# Patient Record
Sex: FEMALE | Race: OTHER | HISPANIC OR LATINO | Employment: FULL TIME | URBAN - METROPOLITAN AREA
[De-identification: names, ages, dates, MRNs, and addresses within clinical notes are randomized per-mention and may not be internally consistent; named-entity substitution may affect disease eponyms.]

---

## 2017-11-20 ENCOUNTER — EH NON-PROVIDER (OUTPATIENT)
Dept: OCCUPATIONAL MEDICINE | Facility: CLINIC | Age: 32
End: 2017-11-20

## 2017-11-20 DIAGNOSIS — Z02.1 PRE-EMPLOYMENT HEALTH SCREENING EXAMINATION: ICD-10-CM

## 2017-11-20 DIAGNOSIS — Z02.1 PRE-EMPLOYMENT DRUG SCREENING: ICD-10-CM

## 2017-11-20 LAB
AMP AMPHETAMINE: NORMAL
BAR BARBITURATES: NORMAL
BZO BENZODIAZEPINES: NORMAL
COC COCAINE: NORMAL
INT CON NEG: NORMAL
INT CON POS: NORMAL
MDMA ECSTASY: NORMAL
MET METHAMPHETAMINES: NORMAL
MTD METHADONE: NORMAL
OPI OPIATES: NORMAL
OXY OXYCODONE: NORMAL
PCP PHENCYCLIDINE: NORMAL
POC URINE DRUG SCREEN OCDRS: NORMAL
THC: NORMAL

## 2017-11-20 PROCEDURE — 80305 DRUG TEST PRSMV DIR OPT OBS: CPT | Performed by: PREVENTIVE MEDICINE

## 2017-12-08 ENCOUNTER — EMPLOYEE HEALTH (OUTPATIENT)
Dept: OCCUPATIONAL MEDICINE | Facility: CLINIC | Age: 32
End: 2017-12-08

## 2017-12-08 ENCOUNTER — EH NON-PROVIDER (OUTPATIENT)
Dept: OCCUPATIONAL MEDICINE | Facility: CLINIC | Age: 32
End: 2017-12-08

## 2017-12-08 ENCOUNTER — HOSPITAL ENCOUNTER (OUTPATIENT)
Facility: MEDICAL CENTER | Age: 32
End: 2017-12-08
Attending: PREVENTIVE MEDICINE
Payer: COMMERCIAL

## 2017-12-08 VITALS
RESPIRATION RATE: 14 BRPM | WEIGHT: 197 LBS | SYSTOLIC BLOOD PRESSURE: 120 MMHG | TEMPERATURE: 97.8 F | HEART RATE: 96 BPM | OXYGEN SATURATION: 100 % | DIASTOLIC BLOOD PRESSURE: 76 MMHG | BODY MASS INDEX: 34.91 KG/M2 | HEIGHT: 63 IN

## 2017-12-08 DIAGNOSIS — Z02.1 ENCOUNTER FOR PRE-EMPLOYMENT HEALTH SCREENING EXAMINATION: ICD-10-CM

## 2017-12-08 DIAGNOSIS — Z02.1 PRE-EMPLOYMENT HEALTH SCREENING EXAMINATION: ICD-10-CM

## 2017-12-08 DIAGNOSIS — Z02.1 PRE-EMPLOYMENT DRUG SCREENING: ICD-10-CM

## 2017-12-08 PROCEDURE — 8915 PR COMPREHENSIVE PHYSICAL: Performed by: PREVENTIVE MEDICINE

## 2017-12-08 PROCEDURE — 86480 TB TEST CELL IMMUN MEASURE: CPT | Performed by: PREVENTIVE MEDICINE

## 2017-12-08 PROCEDURE — 90686 IIV4 VACC NO PRSV 0.5 ML IM: CPT | Performed by: PREVENTIVE MEDICINE

## 2017-12-11 LAB
M TB TUBERC IFN-G BLD QL: NEGATIVE
M TB TUBERC IFN-G/MITOGEN IGNF BLD: 0.01
M TB TUBERC IGNF/MITOGEN IGNF CONTROL: 54.54 [IU]/ML
MITOGEN IGNF BCKGRD COR BLD-ACNC: 0.17 [IU]/ML

## 2018-02-07 ENCOUNTER — OFFICE VISIT (OUTPATIENT)
Dept: MEDICAL GROUP | Facility: PHYSICIAN GROUP | Age: 33
End: 2018-02-07

## 2018-02-07 VITALS
RESPIRATION RATE: 12 BRPM | TEMPERATURE: 98.6 F | DIASTOLIC BLOOD PRESSURE: 66 MMHG | HEART RATE: 94 BPM | WEIGHT: 190 LBS | SYSTOLIC BLOOD PRESSURE: 110 MMHG | HEIGHT: 63 IN | BODY MASS INDEX: 33.66 KG/M2 | OXYGEN SATURATION: 99 %

## 2018-02-07 DIAGNOSIS — F41.9 ANXIETY AND DEPRESSION: ICD-10-CM

## 2018-02-07 DIAGNOSIS — M54.5 CHRONIC LOW BACK PAIN, UNSPECIFIED BACK PAIN LATERALITY, WITH SCIATICA PRESENCE UNSPECIFIED: ICD-10-CM

## 2018-02-07 DIAGNOSIS — F32.A ANXIETY AND DEPRESSION: ICD-10-CM

## 2018-02-07 DIAGNOSIS — F41.9 ANXIETY: ICD-10-CM

## 2018-02-07 DIAGNOSIS — G89.29 CHRONIC LOW BACK PAIN, UNSPECIFIED BACK PAIN LATERALITY, WITH SCIATICA PRESENCE UNSPECIFIED: ICD-10-CM

## 2018-02-07 DIAGNOSIS — E28.2 PCOS (POLYCYSTIC OVARIAN SYNDROME): ICD-10-CM

## 2018-02-07 DIAGNOSIS — L30.9 ECZEMA, UNSPECIFIED TYPE: ICD-10-CM

## 2018-02-07 PROBLEM — M54.50 CHRONIC LOW BACK PAIN: Status: ACTIVE | Noted: 2018-02-07

## 2018-02-07 PROBLEM — I10 ESSENTIAL HYPERTENSION: Status: ACTIVE | Noted: 2018-02-07

## 2018-02-07 PROBLEM — F33.9 RECURRENT MAJOR DEPRESSIVE DISORDER (HCC): Status: ACTIVE | Noted: 2018-02-07

## 2018-02-07 PROCEDURE — 99204 OFFICE O/P NEW MOD 45 MIN: CPT | Performed by: INTERNAL MEDICINE

## 2018-02-07 RX ORDER — VENLAFAXINE HYDROCHLORIDE 225 MG/1
225 TABLET, EXTENDED RELEASE ORAL DAILY
COMMUNITY
End: 2018-02-07 | Stop reason: SDUPTHER

## 2018-02-07 RX ORDER — LORAZEPAM 1 MG/1
1 TABLET ORAL 2 TIMES DAILY PRN
Qty: 60 TAB | Refills: 0 | Status: SHIPPED | OUTPATIENT
Start: 2018-02-07 | End: 2018-03-09

## 2018-02-07 RX ORDER — TRIAMCINOLONE ACETONIDE OINTMENT USP, 0.05% 0.5 MG/G
1 OINTMENT TOPICAL 2 TIMES DAILY
Qty: 45 G | Refills: 1 | Status: SHIPPED | OUTPATIENT
Start: 2018-02-07 | End: 2019-04-15

## 2018-02-07 RX ORDER — VENLAFAXINE HYDROCHLORIDE 225 MG/1
225 TABLET, EXTENDED RELEASE ORAL DAILY
Qty: 30 TAB | Refills: 2 | Status: SHIPPED | OUTPATIENT
Start: 2018-02-07 | End: 2018-06-19 | Stop reason: SDUPTHER

## 2018-02-07 RX ORDER — LORAZEPAM 1 MG/1
1 TABLET ORAL EVERY 4 HOURS PRN
COMMUNITY
End: 2018-02-07 | Stop reason: SDUPTHER

## 2018-02-07 RX ORDER — TRIAMCINOLONE ACETONIDE OINTMENT USP, 0.05% 0.5 MG/G
OINTMENT TOPICAL
COMMUNITY
End: 2018-02-07 | Stop reason: SDUPTHER

## 2018-02-07 ASSESSMENT — PATIENT HEALTH QUESTIONNAIRE - PHQ9
SUM OF ALL RESPONSES TO PHQ QUESTIONS 1-9: 8
5. POOR APPETITE OR OVEREATING: 2 - MORE THAN HALF THE DAYS
CLINICAL INTERPRETATION OF PHQ2 SCORE: 1

## 2018-02-07 NOTE — LETTER
[facility]  Pedro Hunt M.D.  [address]  [place] [address]  Fax: [phone]   Authorization for Release/Disclosure of   Protected Health Information   Name: EZRA LUNA : 1985 SSN: [id number]   Address: [facility] [place] [address] Phone:    [phone] (home)    I authorize the entity listed below to release/disclose the PHI below to:   [facility] hospital/Pedro Hunt M.D. and Pedro Hunt M.D.   Provider or Entity Name:  [facility]  Address: [address] [place] [address] [place] [address]  Phone: [phone]     Address   City, [facility], [facility]   Phone:      Fax:     Reason for request: continuity of care   Information to be released:    [  ] LAST COLONOSCOPY,  including any PATH REPORT and follow-up  [  ] LAST FIT/COLOGUARD RESULT [  ] LAST DEXA  [  ] LAST MAMMOGRAM  [  ] LAST PAP  [  ] LAST LABS [  ] RETINA EXAM REPORT  [  ] IMMUNIZATION RECORDS  [X] Release all info      [  ] Check here and initial the line next to each item to release ALL health information INCLUDING  _____ Care and treatment for drug and / or alcohol abuse  _____ HIV testing, infection status, or AIDS  _____ Genetic Testing    DATES OF SERVICE OR TIME PERIOD TO BE DISCLOSED: _____________  I understand and acknowledge that:  * This Authorization may be revoked at any time by you in writing, except if your health information has already been used or disclosed.  * Your health information that will be used or disclosed as a result of you signing this authorization could be re-disclosed by the recipient. If this occurs, your re-disclosed health information may no longer be protected by State or Federal laws.  * You may refuse to sign this Authorization. Your refusal will not affect your ability to obtain treatment.  * This Authorization becomes effective upon signing and will  on (date) __________.      If no date is indicated, this Authorization will  one (1) year from the signature date.       Name: Naomi Javed    Signature:   Date:     2/7/2018       PLEASE FAX REQUESTED RECORDS BACK TO: [phone]

## 2018-02-07 NOTE — LETTER
[facility]  Pedro Hunt M.D.  [address] [facility] [address]  [place] [address]  Fax: [phone]   Authorization for Release/Disclosure of   Protected Health Information   Name: EZRA LUNA : 1985 SSN: [id number]   Address: [facility] [address] [place] [address] Phone:    [phone] (home)    I authorize the entity listed below to release/disclose the PHI below to:   [facility]/Pedro Hunt M.D. and Pedro Hunt M.D.   Provider or Entity Name:  Doris Mcbride MD       Address   [facility], [address]  [address] [place] [address] Phone:  [phone]    Fax:     Reason for request: continuity of care   Information to be released:    [  ] LAST COLONOSCOPY,  including any PATH REPORT and follow-up  [  ] LAST FIT/COLOGUARD RESULT [  ] LAST DEXA  [  ] LAST MAMMOGRAM  [  ] LAST PAP  [  ] LAST LABS [  ] RETINA EXAM REPORT  [  ] IMMUNIZATION RECORDS  [X] Release all info      [  ] Check here and initial the line next to each item to release ALL health information INCLUDING  _____ Care and treatment for drug and / or alcohol abuse  _____ HIV testing, infection status, or AIDS  _____ Genetic Testing    DATES OF SERVICE OR TIME PERIOD TO BE DISCLOSED: _____________  I understand and acknowledge that:  * This Authorization may be revoked at any time by you in writing, except if your health information has already been used or disclosed.  * Your health information that will be used or disclosed as a result of you signing this authorization could be re-disclosed by the recipient. If this occurs, your re-disclosed health information may no longer be protected by State or Federal laws.  * You may refuse to sign this Authorization. Your refusal will not affect your ability to obtain treatment.  * This Authorization becomes effective upon signing and will  on (date) __________.      If no date is indicated, this Authorization will  one (1) year from the signature date.    Name: Ezra Luna    Signature:   Date:     2/7/2018       PLEASE FAX REQUESTED RECORDS BACK TO: [phone]

## 2018-02-08 NOTE — ASSESSMENT & PLAN NOTE
Pt is on metformin for this. She was being seen by an endocrinologist who was managing this prior to moving here.

## 2018-02-08 NOTE — PROGRESS NOTES
"Subjective:   Naomi Javed is a 32 y.o. female here today for anxiety/depression, back pain, PCOS, eczema,     Anxiety and depression  Pt has known depression and is on venlafaxine  mg daily and lorazepam 1 mg PRN for this. She was seeing a psychiatrist in [place] who was prescribing this. She states that the depression and anxiety were well controlled with the medication. She denies suicidal ideation.     Chronic low back pain  Pt is on norco for this chronically that was managed by pain management in [facility]. Denies fevers/chills, bowel/bladder incontinence, saddle anesthesia. She gets periodic weakness in the lower extremities. She states she had an MRI done last year with her back specialist. She is unsure of the name of the provider.     PCOS (polycystic ovarian syndrome)  Pt is on metformin for this. She was being seen by an endocrinologist who was managing this prior to moving here.     Eczema  Pt reports good control of this with triamciniolone.        Current medicines (including changes today)  Current Outpatient Prescriptions   Medication Sig Dispense Refill   • venlafaxine ER (EFFEXOR) 225 MG TABLET SR 24 HR tablet Take 1 Tab by mouth every day. 30 Tab 2   • LORazepam (ATIVAN) 1 MG Tab Take 1 Tab by mouth 2 times a day as needed for Anxiety for up to 30 days. 60 Tab 0   • metFORMIN (GLUCOPHAGE) 850 MG Tab Take 1 Tab by mouth 2 times a day, with meals. 60 Tab 1   • TRIAMCINOLONE ACETONIDE, TOP, 0.05 % Ointment 1 Application by Apply externally route 2 Times a Day. 45 g 1     No current facility-administered medications for this visit.      She  has a past medical history of Anxiety; Depression; and PCOS (polycystic ovarian syndrome).    ROS   No suicidal ideation, no fevers/chills     Objective:     Blood pressure 110/66, pulse 94, temperature 37 °C (98.6 °F), resp. rate 12, height 1.6 m (5' 3\"), weight 86.2 kg (190 lb), SpO2 99 %. Body mass index is 33.66 kg/m².   Physical " Exam:  Constitutional: Alert & oriented, no acute distress  Eye: Conjunctiva clear, lids normal, no discharge  ENMT: Lips without lesions, normal external nose and ears  Respiratory: Unlabored respiratory effort, lungs clear to auscultation, no wheezes, no ronchi  Cardiovascular: Normal S1, S2, no murmur  Abdomen: Obese  Skin: Warm, dry, good turgor, no rashes in visible areas  Neuro: No overt focal neurologic deficits  Psych: Normal mood and affect      Assessment and Plan:   The following treatment plan was discussed    1. Anxiety and depression  Well controlled. Records request sent to previous psychiatrist. No suicidal ideation. Will refer to psychiatrist for continued management per patient request  - venlafaxine ER (EFFEXOR) 225 MG TABLET SR 24 HR tablet; Take 1 Tab by mouth every day.  Dispense: 30 Tab; Refill: 2  - LORazepam (ATIVAN) 1 MG Tab; Take 1 Tab by mouth 2 times a day as needed for Anxiety for up to 30 days.  Dispense: 60 Tab; Refill: 0  - REFERRAL TO PSYCHIATRY    2. PCOS (polycystic ovarian syndrome)  Continue metformin. Will refer to endocrinologist for continued management per patient request  - metFORMIN (GLUCOPHAGE) 850 MG Tab; Take 1 Tab by mouth 2 times a day, with meals.  Dispense: 60 Tab; Refill: 1  - REFERRAL TO ENDOCRINOLOGY    3. Eczema, unspecified type  Continue triamcinolone  - TRIAMCINOLONE ACETONIDE, TOP, 0.05 % Ointment; 1 Application by Apply externally route 2 Times a Day.  Dispense: 45 g; Refill: 1    4. Anxiety  As above  - LORazepam (ATIVAN) 1 MG Tab; Take 1 Tab by mouth 2 times a day as needed for Anxiety for up to 30 days.  Dispense: 60 Tab; Refill: 0    5. Chronic low back pain, unspecified back pain laterality, with sciatica presence unspecified  Records request sent to previous pain specialist. Will refer to pain clinic. Will hold off on norco refill given patient is on lorazepam and this medication combination can have significant side effects potentially.   - REFERRAL  TO PAIN CLINIC    Followup: Return for as scheduled with new PCP.    Please note that this dictation was created using voice recognition software. I have made every reasonable attempt to correct obvious errors, but I expect that there are errors of grammar and possibly content that I did not discover before finalizing the note.

## 2018-02-08 NOTE — ASSESSMENT & PLAN NOTE
Pt is on norco for this chronically that was managed by pain management in [facility]. Denies fevers/chills, bowel/bladder incontinence, saddle anesthesia. She gets periodic weakness in the lower extremities. She states she had an MRI done last year with her back specialist. She is unsure of the name of the provider.

## 2018-02-08 NOTE — ASSESSMENT & PLAN NOTE
Pt has known depression and is on venlafaxine  mg daily and lorazepam 1 mg PRN for this. She was seeing a psychiatrist in [place] who was prescribing this. She states that the depression and anxiety were well controlled with the medication. She denies suicidal ideation.

## 2018-06-19 ENCOUNTER — TELEPHONE (OUTPATIENT)
Dept: MEDICAL GROUP | Facility: PHYSICIAN GROUP | Age: 33
End: 2018-06-19

## 2018-06-19 ENCOUNTER — OFFICE VISIT (OUTPATIENT)
Dept: MEDICAL GROUP | Facility: PHYSICIAN GROUP | Age: 33
End: 2018-06-19
Payer: COMMERCIAL

## 2018-06-19 VITALS
BODY MASS INDEX: 34.16 KG/M2 | HEIGHT: 63 IN | SYSTOLIC BLOOD PRESSURE: 132 MMHG | WEIGHT: 192.8 LBS | OXYGEN SATURATION: 99 % | TEMPERATURE: 97.4 F | DIASTOLIC BLOOD PRESSURE: 84 MMHG | HEART RATE: 80 BPM | RESPIRATION RATE: 16 BRPM

## 2018-06-19 DIAGNOSIS — E28.2 PCOS (POLYCYSTIC OVARIAN SYNDROME): ICD-10-CM

## 2018-06-19 DIAGNOSIS — Z13.29 SCREENING FOR ENDOCRINE DISORDER: ICD-10-CM

## 2018-06-19 DIAGNOSIS — F41.9 ANXIETY AND DEPRESSION: ICD-10-CM

## 2018-06-19 DIAGNOSIS — E66.9 OBESITY (BMI 30-39.9): ICD-10-CM

## 2018-06-19 DIAGNOSIS — M54.5 CHRONIC LOW BACK PAIN, UNSPECIFIED BACK PAIN LATERALITY, WITH SCIATICA PRESENCE UNSPECIFIED: ICD-10-CM

## 2018-06-19 DIAGNOSIS — F32.A ANXIETY AND DEPRESSION: ICD-10-CM

## 2018-06-19 DIAGNOSIS — G89.29 CHRONIC LOW BACK PAIN, UNSPECIFIED BACK PAIN LATERALITY, WITH SCIATICA PRESENCE UNSPECIFIED: ICD-10-CM

## 2018-06-19 DIAGNOSIS — Z83.3 FAMILY HISTORY OF DIABETES MELLITUS: ICD-10-CM

## 2018-06-19 PROCEDURE — 99214 OFFICE O/P EST MOD 30 MIN: CPT | Performed by: FAMILY MEDICINE

## 2018-06-19 RX ORDER — LORAZEPAM 1 MG/1
1 TABLET ORAL 3 TIMES DAILY
COMMUNITY
End: 2020-10-02

## 2018-06-19 RX ORDER — VENLAFAXINE HYDROCHLORIDE 225 MG/1
225 TABLET, EXTENDED RELEASE ORAL DAILY
Qty: 90 TAB | Refills: 1 | Status: SHIPPED | OUTPATIENT
Start: 2018-06-19 | End: 2018-06-29 | Stop reason: SDUPTHER

## 2018-06-19 NOTE — PROGRESS NOTES
Chief Complaint   Patient presents with   • Anxiety     effexor refill   • Back Pain     pain med refill       HISTORY OF PRESENT ILLNESS: Patient is a 32 y.o. female established patient here today for the following concerns:    Colleen is a pleasant female here today for same day visit.  She is between providers and does not have an appointment to establish until this Fall with her next PCP.  She has hx of depression and anxiety, chronic back pain and PCOS with strong family hx of DM2.     1. Anxiety and depression  Reports that her moods have been good.  Typically takes the effexor daily without changes.  Occasionally will need ativan for panic, but very rarely.  No SI/HI.     2. Chronic low back pain, unspecified back pain laterality, with sciatica presence unspecified  Has hx of spinal stenosis, and degenerative disc disease and facet arthritis per patient.  She typically uses ibuprofen, heating pad and tens unit.  She requests refill on hydrocodone today.  She would like to opt for pain management however.      3. PCOS (polycystic ovarian syndrome)  Has been on metformin for years with good tolerability.  Due for routine labs.      4. Family history of diabetes mellitus  Due for A1c.  No polyuria or polydipsia.     5. Screening for endocrine disorder  Due for screening labs.     6. Obesity (BMI 30-39.9)  Counseled today.       Past Medical, Social, and Family history reviewed and updated in EPIC    Allergies:Patient has no allergy information on record.    Current Outpatient Prescriptions   Medication Sig Dispense Refill   • Etonogestrel (NEXPLANON SC) Inject  as instructed.     • LORazepam (ATIVAN) 1 MG Tab Take 1 mg by mouth every four hours as needed for Anxiety.     • venlafaxine ER (EFFEXOR) 225 MG TABLET SR 24 HR tablet Take 1 Tab by mouth every day. 90 Tab 1   • metFORMIN (GLUCOPHAGE) 850 MG Tab Take 1 Tab by mouth 2 times a day, with meals. 60 Tab 1   • TRIAMCINOLONE ACETONIDE, TOP, 0.05 % Ointment 1  "Application by Apply externally route 2 Times a Day. 45 g 1     No current facility-administered medications for this visit.          ROS:  Review of Systems   Constitutional: Negative for fever, chills, weight loss and malaise/fatigue.   HENT: Negative for ear pain, nosebleeds, congestion, sore throat and neck pain.    Eyes: Negative for blurred vision.   Respiratory: Negative for cough, sputum production, shortness of breath and wheezing.    Cardiovascular: Negative for chest pain, palpitations,  and leg swelling.   Gastrointestinal: Negative for heartburn, nausea, vomiting, diarrhea and abdominal pain.   Genitourinary: Negative for dysuria, urgency and frequency.   Musculoskeletal: Negative for myalgias, back pain and joint pain.   Skin: Negative for rash and itching.   Neurological: Negative for dizziness, tingling, tremors, sensory change, focal weakness and headaches.   Endo/Heme/Allergies: Does not bruise/bleed easily.   Psychiatric/Behavioral: Negative for depression, anxiety, suicidal ideas, insomnia and memory loss.      Exam:  Blood pressure 132/84, pulse 80, temperature 36.3 °C (97.4 °F), resp. rate 16, height 1.6 m (5' 3\"), weight 87.5 kg (192 lb 12.8 oz), SpO2 99 %.    General:  Well nourished, well developed in NAD  Head is grossly normal.  Neck: Supple without JVD   Pulmonary:  Normal effort.   Cardiovascular: Regular rate  Extremities: no clubbing, cyanosis, or edema.  Psych: affect appropriate      Please note that this dictation was created using voice recognition software. I have made every reasonable attempt to correct obvious errors, but I expect that there are errors of grammar and possibly content that I did not discover before finalizing the note.    Assessment/Plan:  1. Anxiety and depression  - venlafaxine ER (EFFEXOR) 225 MG TABLET SR 24 HR tablet; Take 1 Tab by mouth every day.  Dispense: 90 Tab; Refill: 1    2. Chronic low back pain, unspecified back pain laterality, with sciatica " presence unspecified  - REFERRAL TO PAIN CLINIC  Request records, tells me last imaging testing was done 1 year ago in [place].    Declined to renew hydrocodone.   Continue ibuprofen, weight reduction, core strengthening.     3. PCOS (polycystic ovarian syndrome)  Check   - HEMOGLOBIN A1C; Future  - LIPID PROFILE; Future  - MICROALBUMIN CREAT RATIO URINE; Future  - COMP METABOLIC PANEL; Future  - TSH WITH REFLEX TO FT4; Future  Continue metformin    4. Family history of diabetes mellitus    - HEMOGLOBIN A1C; Future    5. Screening for endocrine disorder  - COMP METABOLIC PANEL; Future  - TSH WITH REFLEX TO FT4; Future  - VITAMIN D,25 HYDROXY; Future    6. Obesity (BMI 30-39.9)  - Patient identified as having weight management issue.  Appropriate orders and counseling given.

## 2018-06-19 NOTE — TELEPHONE ENCOUNTER
----- Message from Nora Keating M.D. sent at 6/19/2018  1:15 PM PDT -----  Please get last imaging studies, [place]

## 2018-06-19 NOTE — LETTER
[facility]  Hawk Melissa M.D.  [address] [facility] [address]  Fax: [phone]   Authorization for Release/Disclosure of   Protected Health Information   Name: EZRA LUNA : 1985 SSN: [id number]   Address: [address] [place], [place] [address]  [facility] [place] [address] Phone:    [phone] (home)    I authorize the entity listed below to release/disclose the PHI below to:   [facility]/Hawk Melissa M.D. and Nora Keating M.D.   Provider or Entity Name:     Address   City, State, Zip   Phone:      Fax:     Reason for request: continuity of care   Information to be released:    [  ] LAST COLONOSCOPY,  including any PATH REPORT and follow-up  [  ] LAST FIT/COLOGUARD RESULT [  ] LAST DEXA  [  ] LAST MAMMOGRAM  [  ] LAST PAP  [  ] LAST LABS [  ] RETINA EXAM REPORT  [  ] IMMUNIZATION RECORDS  [X] Release all info      [  ] Check here and initial the line next to each item to release ALL health information INCLUDING  _____ Care and treatment for drug and / or alcohol abuse  _____ HIV testing, infection status, or AIDS  _____ Genetic Testing    DATES OF SERVICE OR TIME PERIOD TO BE DISCLOSED: _____________  I understand and acknowledge that:  * This Authorization may be revoked at any time by you in writing, except if your health information has already been used or disclosed.  * Your health information that will be used or disclosed as a result of you signing this authorization could be re-disclosed by the recipient. If this occurs, your re-disclosed health information may no longer be protected by State or Federal laws.  * You may refuse to sign this Authorization. Your refusal will not affect your ability to obtain treatment.  * This Authorization becomes effective upon signing and will  on (date) __________.      If no date is indicated, this Authorization will  one (1) year from the signature date.    Name: Ezra Luna    Signature:   Date:     2018       PLEASE FAX REQUESTED  RECORDS BACK TO: [phone]

## 2018-06-29 DIAGNOSIS — F32.A ANXIETY AND DEPRESSION: ICD-10-CM

## 2018-06-29 DIAGNOSIS — F41.9 ANXIETY AND DEPRESSION: ICD-10-CM

## 2018-06-29 NOTE — TELEPHONE ENCOUNTER
Patient requests change in pharmacy.     [id number] - [place] - [address]  [address]  [place] [address]  Phone: [phone] Fax: [phone]

## 2018-07-02 RX ORDER — VENLAFAXINE HYDROCHLORIDE 225 MG/1
225 TABLET, EXTENDED RELEASE ORAL DAILY
Qty: 90 TAB | Refills: 1 | Status: SHIPPED | OUTPATIENT
Start: 2018-07-02 | End: 2018-08-02 | Stop reason: SDUPTHER

## 2018-08-01 ENCOUNTER — PATIENT MESSAGE (OUTPATIENT)
Dept: MEDICAL GROUP | Facility: PHYSICIAN GROUP | Age: 33
End: 2018-08-01

## 2018-08-01 DIAGNOSIS — F41.9 ANXIETY AND DEPRESSION: ICD-10-CM

## 2018-08-01 DIAGNOSIS — F32.A ANXIETY AND DEPRESSION: ICD-10-CM

## 2018-08-02 RX ORDER — VENLAFAXINE HYDROCHLORIDE 225 MG/1
225 TABLET, EXTENDED RELEASE ORAL DAILY
Qty: 90 TAB | Refills: 1 | Status: SHIPPED | OUTPATIENT
Start: 2018-08-02 | End: 2019-01-16

## 2018-08-02 RX ORDER — VENLAFAXINE HYDROCHLORIDE 150 MG/1
150 CAPSULE, EXTENDED RELEASE ORAL DAILY
Qty: 30 CAP | Refills: 3 | Status: SHIPPED | OUTPATIENT
Start: 2018-08-02 | End: 2019-01-16 | Stop reason: SDUPTHER

## 2019-01-16 RX ORDER — VENLAFAXINE HYDROCHLORIDE 150 MG/1
CAPSULE, EXTENDED RELEASE ORAL
Qty: 30 CAP | Refills: 2 | Status: SHIPPED | OUTPATIENT
Start: 2019-01-16 | End: 2019-03-07 | Stop reason: SDUPTHER

## 2019-01-16 NOTE — TELEPHONE ENCOUNTER
LOV 6/18. 3 month supply refilled. Please advise pt to make appt for follow-up and additional fills.

## 2019-03-12 ENCOUNTER — TELEPHONE (OUTPATIENT)
Dept: SCHEDULING | Facility: IMAGING CENTER | Age: 34
End: 2019-03-12

## 2019-04-15 ENCOUNTER — OFFICE VISIT (OUTPATIENT)
Dept: INTERNAL MEDICINE | Facility: MEDICAL CENTER | Age: 34
End: 2019-04-15
Payer: COMMERCIAL

## 2019-04-15 VITALS
TEMPERATURE: 98.1 F | DIASTOLIC BLOOD PRESSURE: 62 MMHG | SYSTOLIC BLOOD PRESSURE: 100 MMHG | BODY MASS INDEX: 33.13 KG/M2 | OXYGEN SATURATION: 97 % | HEIGHT: 63 IN | WEIGHT: 187 LBS | HEART RATE: 79 BPM

## 2019-04-15 DIAGNOSIS — L30.9 ECZEMA, UNSPECIFIED TYPE: ICD-10-CM

## 2019-04-15 DIAGNOSIS — F32.A DEPRESSION, UNSPECIFIED DEPRESSION TYPE: ICD-10-CM

## 2019-04-15 DIAGNOSIS — E66.9 OBESITY (BMI 30-39.9): ICD-10-CM

## 2019-04-15 DIAGNOSIS — G89.29 CHRONIC LOW BACK PAIN, UNSPECIFIED BACK PAIN LATERALITY, WITH SCIATICA PRESENCE UNSPECIFIED: ICD-10-CM

## 2019-04-15 DIAGNOSIS — Z80.41 FAMILY HISTORY OF OVARIAN CANCER: ICD-10-CM

## 2019-04-15 DIAGNOSIS — Z00.00 ROUTINE ADULT HEALTH MAINTENANCE: ICD-10-CM

## 2019-04-15 DIAGNOSIS — F41.9 ANXIETY: ICD-10-CM

## 2019-04-15 DIAGNOSIS — E28.2 PCOS (POLYCYSTIC OVARIAN SYNDROME): ICD-10-CM

## 2019-04-15 DIAGNOSIS — Z82.69 FAMILY HISTORY OF SYSTEMIC LUPUS ERYTHEMATOSUS: ICD-10-CM

## 2019-04-15 DIAGNOSIS — R31.9 URINARY TRACT INFECTION WITH HEMATURIA, SITE UNSPECIFIED: ICD-10-CM

## 2019-04-15 DIAGNOSIS — M54.5 CHRONIC LOW BACK PAIN, UNSPECIFIED BACK PAIN LATERALITY, WITH SCIATICA PRESENCE UNSPECIFIED: ICD-10-CM

## 2019-04-15 DIAGNOSIS — E11.8 TYPE 2 DIABETES MELLITUS WITH COMPLICATION, WITHOUT LONG-TERM CURRENT USE OF INSULIN (HCC): ICD-10-CM

## 2019-04-15 DIAGNOSIS — N39.0 URINARY TRACT INFECTION WITH HEMATURIA, SITE UNSPECIFIED: ICD-10-CM

## 2019-04-15 DIAGNOSIS — Z80.3 FAMILY HISTORY OF BREAST CANCER: ICD-10-CM

## 2019-04-15 DIAGNOSIS — R76.11 POSITIVE TB TEST: ICD-10-CM

## 2019-04-15 DIAGNOSIS — G43.809 OTHER MIGRAINE WITHOUT STATUS MIGRAINOSUS, NOT INTRACTABLE: ICD-10-CM

## 2019-04-15 PROBLEM — E11.9 TYPE 2 DIABETES MELLITUS (HCC): Status: ACTIVE | Noted: 2019-04-15

## 2019-04-15 PROBLEM — G43.009 NONINTRACTABLE MIGRAINE: Status: ACTIVE | Noted: 2019-04-15

## 2019-04-15 PROCEDURE — 99203 OFFICE O/P NEW LOW 30 MIN: CPT | Mod: GC | Performed by: INTERNAL MEDICINE

## 2019-04-15 RX ORDER — LAMOTRIGINE 100 MG/1
100 TABLET ORAL 2 TIMES DAILY
Qty: 90 TAB | Refills: 0 | Status: SHIPPED | OUTPATIENT
Start: 2019-04-15 | End: 2019-05-17 | Stop reason: SDUPTHER

## 2019-04-15 RX ORDER — TRIAMCINOLONE ACETONIDE 5 MG/G
1 CREAM TOPICAL 3 TIMES DAILY
Qty: 1 TUBE | Refills: 3 | Status: SHIPPED | OUTPATIENT
Start: 2019-04-15 | End: 2020-11-02 | Stop reason: SDUPTHER

## 2019-04-15 RX ORDER — TRIAMCINOLONE ACETONIDE 5 MG/G
CREAM TOPICAL 3 TIMES DAILY
COMMUNITY
End: 2019-04-15 | Stop reason: SDUPTHER

## 2019-04-15 RX ORDER — PHENTERMINE HYDROCHLORIDE 30 MG/1
30 CAPSULE ORAL EVERY MORNING
COMMUNITY
End: 2019-04-15 | Stop reason: SDUPTHER

## 2019-04-15 RX ORDER — VENLAFAXINE HYDROCHLORIDE 150 MG/1
150 CAPSULE, EXTENDED RELEASE ORAL DAILY
Qty: 60 CAP | Refills: 0 | Status: SHIPPED | OUTPATIENT
Start: 2019-04-15 | End: 2019-07-17 | Stop reason: SDUPTHER

## 2019-04-15 RX ORDER — IBUPROFEN 800 MG/1
800 TABLET ORAL EVERY 8 HOURS PRN
COMMUNITY
End: 2020-11-02

## 2019-04-15 RX ORDER — LAMOTRIGINE 100 MG/1
100 TABLET ORAL DAILY
COMMUNITY
End: 2019-04-15 | Stop reason: SDUPTHER

## 2019-04-15 RX ORDER — IBUPROFEN 800 MG
TABLET ORAL DAILY
COMMUNITY
End: 2019-07-17

## 2019-04-15 RX ORDER — SUMATRIPTAN 50 MG/1
50 TABLET, FILM COATED ORAL
Qty: 10 TAB | Refills: 0 | Status: SHIPPED | OUTPATIENT
Start: 2019-04-15 | End: 2020-11-02 | Stop reason: SDUPTHER

## 2019-04-15 RX ORDER — SUMATRIPTAN 50 MG/1
50 TABLET, FILM COATED ORAL
COMMUNITY
End: 2019-04-15 | Stop reason: SDUPTHER

## 2019-04-15 RX ORDER — PHENTERMINE HYDROCHLORIDE 30 MG/1
30 CAPSULE ORAL EVERY MORNING
Qty: 30 CAP | Refills: 0 | Status: SHIPPED | OUTPATIENT
Start: 2019-04-15 | End: 2019-04-15

## 2019-04-15 RX ORDER — HYDROCODONE BITARTRATE AND ACETAMINOPHEN 10; 325 MG/1; MG/1
1-2 TABLET ORAL EVERY 6 HOURS PRN
COMMUNITY
End: 2020-10-02

## 2019-04-15 NOTE — PATIENT INSTRUCTIONS
Obesity, Adult  Introduction  Obesity is having too much body fat. If you have a BMI of 30 or more, you are obese. BMI is a number that explains how much body fat you have. Obesity is often caused by taking in (consuming) more calories than your body uses.  Obesity can cause serious health problems. Changing your lifestyle can help to treat obesity.  Follow these instructions at home:  Eating and drinking  · Follow advice from your doctor about what to eat and drink. Your doctor may tell you to:  ¨ Cut down on (limit) fast foods, sweets, and processed snack foods.  ¨ Choose low-fat options. For example, choose low-fat milk instead of whole milk.  ¨ Eat 5 or more servings of fruits or vegetables every day.  ¨ Eat at home more often. This gives you more control over what you eat.  ¨ Choose healthy foods when you eat out.  ¨ Learn what a healthy portion size is. A portion size is the amount of a certain food that is healthy for you to eat at one time. This is different for each person.  ¨ Keep low-fat snacks available.  ¨ Avoid sugary drinks. These include soda, fruit juice, iced tea that is sweetened with sugar, and flavored milk.  ¨ Eat a healthy breakfast.  · Drink enough water to keep your pee (urine) clear or pale yellow.  · Do not go without eating for long periods of time (do not fast).  · Do not go on popular or trendy diets (fad diets).  Physical Activity  · Exercise often, as told by your doctor. Ask your doctor:  ¨ What types of exercise are safe for you.  ¨ How often you should exercise.  · Warm up and stretch before being active.  · Do slow stretching after being active (cool down).  · Rest between times of being active.  Lifestyle  · Limit how much time you spend in front of your TV, computer, or video game system (be less sedentary).  · Find ways to reward yourself that do not involve food.  · Limit alcohol intake to no more than 1 drink a day for nonpregnant women and 2 drinks a day for men. One drink  equals 12 oz of beer, 5 oz of wine, or 1½ oz of hard liquor.  General instructions  · Keep a weight loss journal. This can help you keep track of:  ¨ The food that you eat.  ¨ The exercise that you do.  · Take over-the-counter and prescription medicines only as told by your doctor.  · Take vitamins and supplements only as told by your doctor.  · Think about joining a support group. Your doctor may be able to help with this.  · Keep all follow-up visits as told by your doctor. This is important.  Contact a doctor if:  · You cannot meet your weight loss goal after you have changed your diet and lifestyle for 6 weeks.  This information is not intended to replace advice given to you by your health care provider. Make sure you discuss any questions you have with your health care provider.  Document Released: 03/11/2013 Document Revised: 05/25/2017 Document Reviewed: 10/05/2016  © 2017 [employer]

## 2019-04-16 ENCOUNTER — TELEPHONE (OUTPATIENT)
Dept: INTERNAL MEDICINE | Facility: MEDICAL CENTER | Age: 34
End: 2019-04-16

## 2019-04-16 NOTE — PROGRESS NOTES
New Patient to Establish    Reason to establish: New patient to establish    CC: Refills on meds/Obesity/PCOS    HPI: 33-year-old female with a past medical history of PCO S diabetes, obesity, history of multiple mood disorders including anxiety and depression, history of chronic low back pain status post discectomy/decompression surgery in the past on chronic opioids, anxiety on chronic anti-anxiolytics/benzodiazepines is coming in today to establish care with us.    Patient reports living in [place] for the past couple of years but prior to the used to live in [place] and since she moved she never established with any of the doctors in town.  Today she is here to establish care with us as well as to get refills on her medications.  She does states that for her medications she has been getting most of his medications through urgent care and [place].  She is going to be seeing a spine doctor for her chronic low back pain and is not requesting for any refills on opioid/benzodiazepines.  She is also scheduled to see psych doctor on July 17.  We shall give her a month of supply for her medications for right now until she establishes with a psychiatrist.  She is also further encouraged to see a spine doctor for low back pain and minimize use of benzos and opioids given her morbid obesity.  She would also like to get a referral to see a nutritionist and an endocrinologist given her history of PCOS and obesity.    Patient Active Problem List    Diagnosis Date Noted   • Family history of systemic lupus erythematosus 04/15/2019   • Depression 04/15/2019   • Type 2 diabetes mellitus (HCC) 04/15/2019   • Family history of ovarian cancer 04/15/2019   • Family history of breast cancer 04/15/2019   • Nonintractable migraine 04/15/2019   • Positive TB test 04/15/2019   • Obesity (BMI 30-39.9) 06/19/2018   • Anxiety and depression 02/07/2018   • PCOS (polycystic ovarian syndrome) 02/07/2018   • Eczema 02/07/2018   • Anxiety  02/07/2018   • Chronic low back pain 02/07/2018       Past Medical History:   Diagnosis Date   • Anxiety    • Depression    • PCOS (polycystic ovarian syndrome)        Current Outpatient Prescriptions   Medication Sig Dispense Refill   • HYDROcodone/acetaminophen (NORCO)  MG Tab Take 1-2 Tabs by mouth every 6 hours as needed.     • ibuprofen (MOTRIN) 800 MG Tab Take 800 mg by mouth every 8 hours as needed.     • Cholecalciferol (VITAMIN D3) 400 units Cap Take  by mouth every day.     • lamoTRIgine (LAMICTAL) 100 MG Tab Take 1 Tab by mouth 2 times a day. One tablet po in the morning and two tablets po at night 90 Tab 0   • metFORMIN (GLUCOPHAGE) 850 MG Tab Take 1 Tab by mouth 2 times a day, with meals. 60 Tab 1   • SUMAtriptan (IMITREX) 50 MG Tab Take 1 Tab by mouth Once PRN for Migraine. 10 Tab 0   • triamcinolone acetonide (KENALOG) 0.5 % Cream Apply 1 Tube to affected area(s) 3 times a day. 1 Tube 3   • Brexpiprazole (REXULTI) 0.5 MG Tab Take 0.5 mg by mouth every day. 30 Tab 0   • venlafaxine (EFFEXOR-XR) 150 MG extended-release capsule Take 1 Cap by mouth every day. 60 Cap 0   • Etonogestrel (NEXPLANON SC) Inject  as instructed.     • LORazepam (ATIVAN) 1 MG Tab Take 1 mg by mouth 3 times a day.       No current facility-administered medications for this visit.        Allergies as of 04/15/2019   • (No Known Allergies)       Social History     Social History   • Marital status: Single     Spouse name: N/A   • Number of children: N/A   • Years of education: N/A     Occupational History   • Not on file.     Social History Main Topics   • Smoking status: Never Smoker   • Smokeless tobacco: Never Used   • Alcohol use Yes      Comment: rarely. About once a month   • Drug use: No   • Sexual activity: Not on file     Other Topics Concern   • Not on file     Social History Narrative   • No narrative on file       Family History   Problem Relation Age of Onset   • Diabetes Mother    • Cancer Mother         ovarian  "  • Cancer Paternal Aunt         breast   • Heart Disease Maternal Grandmother    • Cancer Paternal Grandmother         pancreatic       Past Surgical History:   Procedure Laterality Date   • OTHER      back surgery       ROS: As per HPI. Additional pertinent symptoms as noted below.      /62 (BP Location: Left arm, Patient Position: Sitting, BP Cuff Size: Adult)   Pulse 79   Temp 36.7 °C (98.1 °F) (Temporal)   Ht 1.594 m (5' 2.75\")   Wt 84.8 kg (187 lb)   SpO2 97%   BMI 33.39 kg/m²     Physical Exam  General:  Alert and oriented, No apparent distress.    Eyes: Pupils equal and reactive. No scleral icterus.    Throat: Clear no erythema or exudates noted.    Neck: Supple. No lymphadenopathy noted. Thyroid not enlarged.    Lungs: Clear to auscultation and percussion bilaterally.    Cardiovascular: Regular rate and rhythm. No murmurs, rubs or gallops.    Abdomen:  Benign. No rebound or guarding noted.    Extremities: No clubbing, cyanosis, edema.    Skin: Clear. No rash or suspicious skin lesions noted.          Assessment and Plan    1. Family history of systemic lupus erythematosus  -Patient's mother recently got diagnosed with lupus.  She would like to get tested for it although she has no signs or symptoms of or manifestations of lupus at this point.  -ERON with reflex placed.    2. PCOS (polycystic ovarian syndrome)  -Patient has a history of PCO S and have no kids and currently has.  She is 3months weight no nausea recent weight on implant in place.    3. Anxiety  -On lorazepam currently and patient further instructed to discuss it with her psychiatrist to minimize use of lorazepam please note no prescriptions placed for any opioids or benzos today in the clinic.    4. Depression, unspecified depression type  -Patient is currently on Lamictal, Venlafaxine and Resxulti and benzodiazepines for anxiety/depression/mood problems.  -Upcoming appointment with psychiatrist on July 17.  Patient given 1 month " refills on all her prescriptions but is further instructed to seek immediate care to be under a psychiatrist given her long list of medications.  -Mood stable.    5. Eczema, unspecified type  -Triamcinolone cream ointment prescribed.    6. Obesity (BMI 30-39.9)  -Have been on phentermine for a while.  Patient further advised to abstain from the medication given its addictive profile.  No prescriptions given in the clinic today.  Referral for nutritionist placed today.    7. Type 2 diabetes mellitus with complication, without long-term current use of insulin (HCC)  -Refill given on metformin we shall check her HbA1c/fasting glucose.    8. Family history of ovarian cancer  -Mother as well as mother Sister suffered from ovarian cancer at the age of 30s and 40s.    9. Family history of breast cancer  -Father's sister suffers from breast cancer at a young age between 30s and 40s.  I think would be best to start screening around 40.  -She herself denies any lumps or bumps or nipple discharge.    10. Chronic low back pain, unspecified back pain laterality, with sciatica presence unspecified  On Chronic opiods.  S/p decompression surgery  Further instructed to follow up with spine doctor.    11. Other migraine without status migrainosus, not intractable  On Imitrex.  Prescription for refill placed.  Increase in frequency although not associated with auras or focal neurological deficits.  Further instructed on talking to psychiatrist about starting medication that can control the migraine as well.     12. Routine adult health maintenance  Check CBC CMP TSH vitamin D and lipid and HbA1c.    13. 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  She would like to repeat the testing again today.  No signs and symptoms of active TB.  Quant Gold ordered.     14. Urinary tract infection with hematuria, site unspecified  Check UA.           Signed by: Saul Nguyen M.D.

## 2019-04-16 NOTE — TELEPHONE ENCOUNTER
Pharmacy Name: [facility]    Pharmacy Phone#: [phone]    Pharmacy Fax#: [phone]    Request received via: fax    Message: pharmacy needs clarification on  Sig for triamcinolone ,sumatriptan and lamotrigine has two set of directions

## 2019-04-16 NOTE — TELEPHONE ENCOUNTER
"VOICEMAIL  1. Caller Name: [facility]                 Call Back Number:   2. Message: requesting clarification on instructions for Kenalog cream. Sig sent - \"Apply 1 Tube to affected area(s) 3 times a day\"    pcp please send in correctly to pharmacy.     3. Patient approves office to leave a detailed voicemail/MyChart message: N\A        "

## 2019-05-14 ENCOUNTER — HOSPITAL ENCOUNTER (OUTPATIENT)
Dept: LAB | Facility: MEDICAL CENTER | Age: 34
End: 2019-05-14
Attending: INTERNAL MEDICINE
Payer: COMMERCIAL

## 2019-05-14 DIAGNOSIS — R76.11 POSITIVE TB TEST: ICD-10-CM

## 2019-05-14 DIAGNOSIS — E11.8 TYPE 2 DIABETES MELLITUS WITH COMPLICATION, WITHOUT LONG-TERM CURRENT USE OF INSULIN (HCC): ICD-10-CM

## 2019-05-14 DIAGNOSIS — G89.29 CHRONIC LOW BACK PAIN, UNSPECIFIED BACK PAIN LATERALITY, WITH SCIATICA PRESENCE UNSPECIFIED: ICD-10-CM

## 2019-05-14 DIAGNOSIS — Z00.00 ROUTINE ADULT HEALTH MAINTENANCE: ICD-10-CM

## 2019-05-14 DIAGNOSIS — F32.A DEPRESSION, UNSPECIFIED DEPRESSION TYPE: ICD-10-CM

## 2019-05-14 DIAGNOSIS — R31.9 URINARY TRACT INFECTION WITH HEMATURIA, SITE UNSPECIFIED: ICD-10-CM

## 2019-05-14 DIAGNOSIS — Z82.69 FAMILY HISTORY OF SYSTEMIC LUPUS ERYTHEMATOSUS: ICD-10-CM

## 2019-05-14 DIAGNOSIS — F41.9 ANXIETY: ICD-10-CM

## 2019-05-14 DIAGNOSIS — E66.9 OBESITY (BMI 30-39.9): ICD-10-CM

## 2019-05-14 DIAGNOSIS — Z80.3 FAMILY HISTORY OF BREAST CANCER: ICD-10-CM

## 2019-05-14 DIAGNOSIS — E28.2 PCOS (POLYCYSTIC OVARIAN SYNDROME): ICD-10-CM

## 2019-05-14 DIAGNOSIS — L30.9 ECZEMA, UNSPECIFIED TYPE: ICD-10-CM

## 2019-05-14 DIAGNOSIS — M54.5 CHRONIC LOW BACK PAIN, UNSPECIFIED BACK PAIN LATERALITY, WITH SCIATICA PRESENCE UNSPECIFIED: ICD-10-CM

## 2019-05-14 DIAGNOSIS — Z80.41 FAMILY HISTORY OF OVARIAN CANCER: ICD-10-CM

## 2019-05-14 DIAGNOSIS — N39.0 URINARY TRACT INFECTION WITH HEMATURIA, SITE UNSPECIFIED: ICD-10-CM

## 2019-05-14 DIAGNOSIS — G43.809 OTHER MIGRAINE WITHOUT STATUS MIGRAINOSUS, NOT INTRACTABLE: ICD-10-CM

## 2019-05-14 LAB
25(OH)D3 SERPL-MCNC: 20 NG/ML (ref 30–100)
ALBUMIN SERPL BCP-MCNC: 3.9 G/DL (ref 3.2–4.9)
ALBUMIN/GLOB SERPL: 1.4 G/DL
ALP SERPL-CCNC: 59 U/L (ref 30–99)
ALT SERPL-CCNC: 23 U/L (ref 2–50)
ANION GAP SERPL CALC-SCNC: 5 MMOL/L (ref 0–11.9)
APPEARANCE UR: CLEAR
AST SERPL-CCNC: 15 U/L (ref 12–45)
BASOPHILS # BLD AUTO: 0.6 % (ref 0–1.8)
BASOPHILS # BLD: 0.03 K/UL (ref 0–0.12)
BILIRUB SERPL-MCNC: 0.6 MG/DL (ref 0.1–1.5)
BILIRUB UR QL STRIP.AUTO: NEGATIVE
BUN SERPL-MCNC: 11 MG/DL (ref 8–22)
CALCIUM SERPL-MCNC: 8.8 MG/DL (ref 8.5–10.5)
CHLORIDE SERPL-SCNC: 108 MMOL/L (ref 96–112)
CHOLEST SERPL-MCNC: 143 MG/DL (ref 100–199)
CO2 SERPL-SCNC: 23 MMOL/L (ref 20–33)
COLOR UR: YELLOW
CREAT SERPL-MCNC: 0.48 MG/DL (ref 0.5–1.4)
EOSINOPHIL # BLD AUTO: 0.11 K/UL (ref 0–0.51)
EOSINOPHIL NFR BLD: 2.1 % (ref 0–6.9)
ERYTHROCYTE [DISTWIDTH] IN BLOOD BY AUTOMATED COUNT: 39.9 FL (ref 35.9–50)
EST. AVERAGE GLUCOSE BLD GHB EST-MCNC: 111 MG/DL
FASTING STATUS PATIENT QL REPORTED: NORMAL
GLOBULIN SER CALC-MCNC: 2.8 G/DL (ref 1.9–3.5)
GLUCOSE SERPL-MCNC: 97 MG/DL (ref 65–99)
GLUCOSE UR STRIP.AUTO-MCNC: NEGATIVE MG/DL
HBA1C MFR BLD: 5.5 % (ref 0–5.6)
HCT VFR BLD AUTO: 45.6 % (ref 37–47)
HDLC SERPL-MCNC: 45 MG/DL
HGB BLD-MCNC: 14.9 G/DL (ref 12–16)
IMM GRANULOCYTES # BLD AUTO: 0.01 K/UL (ref 0–0.11)
IMM GRANULOCYTES NFR BLD AUTO: 0.2 % (ref 0–0.9)
KETONES UR STRIP.AUTO-MCNC: NEGATIVE MG/DL
LDLC SERPL CALC-MCNC: 85 MG/DL
LEUKOCYTE ESTERASE UR QL STRIP.AUTO: NEGATIVE
LYMPHOCYTES # BLD AUTO: 1.77 K/UL (ref 1–4.8)
LYMPHOCYTES NFR BLD: 34.4 % (ref 22–41)
MCH RBC QN AUTO: 30.1 PG (ref 27–33)
MCHC RBC AUTO-ENTMCNC: 32.7 G/DL (ref 33.6–35)
MCV RBC AUTO: 92.1 FL (ref 81.4–97.8)
MICRO URNS: NORMAL
MONOCYTES # BLD AUTO: 0.37 K/UL (ref 0–0.85)
MONOCYTES NFR BLD AUTO: 7.2 % (ref 0–13.4)
NEUTROPHILS # BLD AUTO: 2.86 K/UL (ref 2–7.15)
NEUTROPHILS NFR BLD: 55.5 % (ref 44–72)
NITRITE UR QL STRIP.AUTO: NEGATIVE
NRBC # BLD AUTO: 0 K/UL
NRBC BLD-RTO: 0 /100 WBC
PH UR STRIP.AUTO: 8 [PH]
PLATELET # BLD AUTO: 301 K/UL (ref 164–446)
PMV BLD AUTO: 10.1 FL (ref 9–12.9)
POTASSIUM SERPL-SCNC: 3.9 MMOL/L (ref 3.6–5.5)
PROT SERPL-MCNC: 6.7 G/DL (ref 6–8.2)
PROT UR QL STRIP: NEGATIVE MG/DL
RBC # BLD AUTO: 4.95 M/UL (ref 4.2–5.4)
RBC UR QL AUTO: NEGATIVE
SODIUM SERPL-SCNC: 136 MMOL/L (ref 135–145)
SP GR UR STRIP.AUTO: 1.02
TRIGL SERPL-MCNC: 66 MG/DL (ref 0–149)
UROBILINOGEN UR STRIP.AUTO-MCNC: 0.2 MG/DL
WBC # BLD AUTO: 5.2 K/UL (ref 4.8–10.8)

## 2019-05-14 PROCEDURE — 80053 COMPREHEN METABOLIC PANEL: CPT

## 2019-05-14 PROCEDURE — 86480 TB TEST CELL IMMUN MEASURE: CPT

## 2019-05-14 PROCEDURE — 81003 URINALYSIS AUTO W/O SCOPE: CPT

## 2019-05-14 PROCEDURE — 80061 LIPID PANEL: CPT

## 2019-05-14 PROCEDURE — 82306 VITAMIN D 25 HYDROXY: CPT

## 2019-05-14 PROCEDURE — 83036 HEMOGLOBIN GLYCOSYLATED A1C: CPT

## 2019-05-14 PROCEDURE — 85025 COMPLETE CBC W/AUTO DIFF WBC: CPT

## 2019-05-14 PROCEDURE — 86038 ANTINUCLEAR ANTIBODIES: CPT

## 2019-05-14 PROCEDURE — 36415 COLL VENOUS BLD VENIPUNCTURE: CPT

## 2019-05-15 LAB
GAMMA INTERFERON BACKGROUND BLD IA-ACNC: 0.54 IU/ML
M TB IFN-G BLD-IMP: NEGATIVE
M TB IFN-G CD4+ BCKGRND COR BLD-ACNC: 0.11 IU/ML
MITOGEN IGNF BCKGRD COR BLD-ACNC: >10 IU/ML
NUCLEAR IGG SER QL IA: NORMAL
QFT TB2 - NIL TBQ2: 0.21 IU/ML

## 2019-05-17 DIAGNOSIS — F32.A DEPRESSION, UNSPECIFIED DEPRESSION TYPE: ICD-10-CM

## 2019-05-17 NOTE — TELEPHONE ENCOUNTER
Was the patient seen in the last year in this department? Yes  Last seen on 4/15/19 by Dr. Nguyen Next Appt 5/22/19  Does patient have an active prescription for medications requested? No     Received Request Via: Pharmacy

## 2019-05-20 RX ORDER — LAMOTRIGINE 100 MG/1
100 TABLET ORAL 2 TIMES DAILY
Qty: 90 TAB | Refills: 0 | Status: SHIPPED | OUTPATIENT
Start: 2019-05-20 | End: 2019-07-17

## 2019-05-22 ENCOUNTER — HOSPITAL ENCOUNTER (OUTPATIENT)
Facility: MEDICAL CENTER | Age: 34
End: 2019-05-22
Attending: INTERNAL MEDICINE
Payer: COMMERCIAL

## 2019-05-22 ENCOUNTER — OFFICE VISIT (OUTPATIENT)
Dept: INTERNAL MEDICINE | Facility: MEDICAL CENTER | Age: 34
End: 2019-05-22
Payer: COMMERCIAL

## 2019-05-22 VITALS
TEMPERATURE: 97.8 F | DIASTOLIC BLOOD PRESSURE: 74 MMHG | WEIGHT: 191 LBS | HEIGHT: 63 IN | RESPIRATION RATE: 18 BRPM | OXYGEN SATURATION: 96 % | BODY MASS INDEX: 33.84 KG/M2 | HEART RATE: 88 BPM | SYSTOLIC BLOOD PRESSURE: 128 MMHG

## 2019-05-22 DIAGNOSIS — Z12.4 ROUTINE CERVICAL SMEAR: ICD-10-CM

## 2019-05-22 DIAGNOSIS — E28.2 PCOS (POLYCYSTIC OVARIAN SYNDROME): ICD-10-CM

## 2019-05-22 DIAGNOSIS — M54.5 CHRONIC LOW BACK PAIN, UNSPECIFIED BACK PAIN LATERALITY, WITH SCIATICA PRESENCE UNSPECIFIED: ICD-10-CM

## 2019-05-22 DIAGNOSIS — Z82.69 FAMILY HISTORY OF SYSTEMIC LUPUS ERYTHEMATOSUS: ICD-10-CM

## 2019-05-22 DIAGNOSIS — Z80.41 FAMILY HISTORY OF OVARIAN CANCER: ICD-10-CM

## 2019-05-22 DIAGNOSIS — F32.A DEPRESSION, UNSPECIFIED DEPRESSION TYPE: ICD-10-CM

## 2019-05-22 DIAGNOSIS — Z13.71 BRCA GENE MUTATION NEGATIVE: ICD-10-CM

## 2019-05-22 DIAGNOSIS — Z80.3 FAMILY HISTORY OF BREAST CANCER: ICD-10-CM

## 2019-05-22 DIAGNOSIS — F41.9 ANXIETY: ICD-10-CM

## 2019-05-22 DIAGNOSIS — L30.9 ECZEMA, UNSPECIFIED TYPE: ICD-10-CM

## 2019-05-22 DIAGNOSIS — E66.9 OBESITY (BMI 30-39.9): ICD-10-CM

## 2019-05-22 DIAGNOSIS — G89.29 CHRONIC LOW BACK PAIN, UNSPECIFIED BACK PAIN LATERALITY, WITH SCIATICA PRESENCE UNSPECIFIED: ICD-10-CM

## 2019-05-22 DIAGNOSIS — G43.809 OTHER MIGRAINE WITHOUT STATUS MIGRAINOSUS, NOT INTRACTABLE: ICD-10-CM

## 2019-05-22 PROBLEM — E11.9 TYPE 2 DIABETES MELLITUS (HCC): Status: RESOLVED | Noted: 2019-04-15 | Resolved: 2019-05-22

## 2019-05-22 PROCEDURE — 88175 CYTOPATH C/V AUTO FLUID REDO: CPT

## 2019-05-22 PROCEDURE — 99395 PREV VISIT EST AGE 18-39: CPT | Mod: GC | Performed by: INTERNAL MEDICINE

## 2019-05-22 PROCEDURE — 87624 HPV HI-RISK TYP POOLED RSLT: CPT

## 2019-05-22 PROCEDURE — 99000 SPECIMEN HANDLING OFFICE-LAB: CPT | Performed by: INTERNAL MEDICINE

## 2019-05-22 ASSESSMENT — PATIENT HEALTH QUESTIONNAIRE - PHQ9: CLINICAL INTERPRETATION OF PHQ2 SCORE: 0

## 2019-05-22 ASSESSMENT — PAIN SCALES - GENERAL: PAINLEVEL: NO PAIN

## 2019-05-22 NOTE — PATIENT INSTRUCTIONS
Depression, Adult  Depression refers to feeling sad, low, down in the dumps, blue, gloomy, or empty. In general, there are two kinds of depression:  1. Normal sadness or normal grief. This kind of depression is one that we all feel from time to time after upsetting life experiences, such as the loss of a job or the ending of a relationship. This kind of depression is considered normal, is short lived, and resolves within a few days to 2 weeks. Depression experienced after the loss of a loved one (bereavement) often lasts longer than 2 weeks but normally gets better with time.  2. Clinical depression. This kind of depression lasts longer than normal sadness or normal grief or interferes with your ability to function at home, at work, and in school. It also interferes with your personal relationships. It affects almost every aspect of your life. Clinical depression is an illness.  Symptoms of depression can also be caused by conditions other than those mentioned above, such as:  · Physical illness. Some physical illnesses, including underactive thyroid gland (hypothyroidism), severe anemia, specific types of cancer, diabetes, uncontrolled seizures, heart and lung problems, strokes, and chronic pain are commonly associated with symptoms of depression.  · Side effects of some prescription medicine. In some people, certain types of medicine can cause symptoms of depression.  · Substance abuse. Abuse of alcohol and illicit drugs can cause symptoms of depression.  SYMPTOMS  Symptoms of normal sadness and normal grief include the following:  · Feeling sad or crying for short periods of time.  · Not caring about anything (apathy).  · Difficulty sleeping or sleeping too much.  · No longer able to enjoy the things you used to enjoy.  · Desire to be by oneself all the time (social isolation).  · Lack of energy or motivation.  · Difficulty concentrating or remembering.  · Change in appetite or weight.  · Restlessness or  agitation.  Symptoms of clinical depression include the same symptoms of normal sadness or normal grief and also the following symptoms:  · Feeling sad or crying all the time.  · Feelings of guilt or worthlessness.  · Feelings of hopelessness or helplessness.  · Thoughts of suicide or the desire to harm yourself (suicidal ideation).  · Loss of touch with reality (psychotic symptoms). Seeing or hearing things that are not real (hallucinations) or having false beliefs about your life or the people around you (delusions and paranoia).  DIAGNOSIS   The diagnosis of clinical depression is usually based on how bad the symptoms are and how long they have lasted. Your health care provider will also ask you questions about your medical history and substance use to find out if physical illness, use of prescription medicine, or substance abuse is causing your depression. Your health care provider may also order blood tests.  TREATMENT   Often, normal sadness and normal grief do not require treatment. However, sometimes antidepressant medicine is given for bereavement to ease the depressive symptoms until they resolve.  The treatment for clinical depression depends on how bad the symptoms are but often includes antidepressant medicine, counseling with a mental health professional, or both. Your health care provider will help to determine what treatment is best for you.  Depression caused by physical illness usually goes away with appropriate medical treatment of the illness. If prescription medicine is causing depression, talk with your health care provider about stopping the medicine, decreasing the dose, or changing to another medicine.  Depression caused by the abuse of alcohol or illicit drugs goes away when you stop using these substances. Some adults need professional help in order to stop drinking or using drugs.  SEEK IMMEDIATE MEDICAL CARE IF:  · You have thoughts about hurting yourself or others.  · You lose touch  with reality (have psychotic symptoms).  · You are taking medicine for depression and have a serious side effect.  FOR MORE INFORMATION  · National [place] on Mental Illness: www.iliana.org   · National [place] of [place] Health: www.nimh.nih.gov      This information is not intended to replace advice given to you by your health care provider. Make sure you discuss any questions you have with your health care provider.     Document Released: 12/15/2001 Document Revised: 01/08/2016 Document Reviewed: 03/18/2013  Elsevier Interactive Patient Education ©2016 [employer]

## 2019-05-23 DIAGNOSIS — Z12.4 ROUTINE CERVICAL SMEAR: ICD-10-CM

## 2019-05-23 NOTE — PROGRESS NOTES
Established Patient    Naomi presents today with the following:    CC: Pap smear    HPI: 33-year-old female with a past medical history of PCOS, diabetes, obesity, history of multiple mood disorders including anxiety and depression, history of chronic low back pain status post discectomy/decompression surgery in the past on chronic opioids, anxiety on chronic anti-anxiolytics/benzodiazepines is coming in for follow up today.    She is here today to get her Pap smear done she denies any history of STDs vaginal discharge bleeding.  She is currently not sexually active.    The patient is concerned about his lupus as her mother got recently diagnosed with lupus and she is been suffering a lot.  Patient has no signs or symptoms or any manifestations of any kind of autoimmune disease.  ERON was done that was negative.  Patient was further educated on as there is no genetic test or way of predicting if she would ever get lupus in her life.        Patient Active Problem List    Diagnosis Date Noted   • BRCA gene mutation negative 05/22/2019   • Family history of systemic lupus erythematosus 04/15/2019   • Depression 04/15/2019   • Family history of ovarian cancer 04/15/2019   • Family history of breast cancer 04/15/2019   • Nonintractable migraine 04/15/2019   • Positive TB test 04/15/2019   • Obesity (BMI 30-39.9) 06/19/2018   • PCOS (polycystic ovarian syndrome) 02/07/2018   • Eczema 02/07/2018   • Anxiety 02/07/2018   • Chronic low back pain 02/07/2018       Current Outpatient Prescriptions   Medication Sig Dispense Refill   • lamoTRIgine (LAMICTAL) 100 MG Tab Take 1 Tab by mouth 2 times a day. One tablet po in the morning and two tablets po at night 90 Tab 0   • ibuprofen (MOTRIN) 800 MG Tab Take 800 mg by mouth every 8 hours as needed.     • metFORMIN (GLUCOPHAGE) 850 MG Tab Take 1 Tab by mouth 2 times a day, with meals. 60 Tab 1   • triamcinolone acetonide (KENALOG) 0.5 % Cream Apply 1 Tube to affected area(s) 3  times a day. 1 Tube 3   • venlafaxine (EFFEXOR-XR) 150 MG extended-release capsule Take 1 Cap by mouth every day. 60 Cap 0   • Etonogestrel (NEXPLANON SC) Inject  as instructed.     • HYDROcodone/acetaminophen (NORCO)  MG Tab Take 1-2 Tabs by mouth every 6 hours as needed.     • Cholecalciferol (VITAMIN D3) 400 units Cap Take  by mouth every day.     • SUMAtriptan (IMITREX) 50 MG Tab Take 1 Tab by mouth Once PRN for Migraine. 10 Tab 0   • LORazepam (ATIVAN) 1 MG Tab Take 1 mg by mouth 3 times a day.       No current facility-administered medications for this visit.          Health Maintenance        Hep C: Screening for those born between 7292-0437    Diabetes: All non-Caucasians; All Caucasians with sustained blood pressure greater than 135/80, or BMI greater than or equal to 25, or history of gestational diabetes, or family history of diabetes.    Colorectal Cancer (Options): Colonoscopy every 10 years; Fecal Occult Blood Testing every 3 years with sigmoidoscopy every 5 years; or Annual Fecal Occult Blood testing.    HIV Screening: Between ages 15-65    Alcohol Misuse:     Influenza: Yearly    Tdap/Td: Adults under 65 who have never received Tdap should get a Tdap booster, regardless of when a prior Td was given. After this, Td is required every 10 years.    Zoster (Shingles): At age 60    Pneumococcal Vaccine: Series beginning at age 65    Men's Health  Lipid Test: Every 5 years  Prostate Specific Antigen (PSA): Current evidence does not recommend routine PSA screening for average risk men.    Women's Health  Cervical Cancer Screening: Pap only every 3 years for ages 21-29, Pap test with HPV screening every 5 years from ages 30-65  Mammogram: Every 2 years  Bone Density: At age 65                ROS: As per HPI. Additional pertinent symptoms as noted below.      /74 (BP Location: Left arm, Patient Position: Sitting, BP Cuff Size: Adult long)   Pulse 88   Temp 36.6 °C (97.8 °F) (Temporal)   Resp 18   " Ht 1.61 m (5' 3.39\")   Wt 86.6 kg (191 lb)   SpO2 96%   Breastfeeding? No   BMI 33.42 kg/m²     Physical Exam     Constitutional: Obese, Alert and oriented, No acute distress   HEENT: Normocephalic, Atraumatic, Bilateral external ears normal, Oropharynx moist mucous membranes, No oral exudates, Nose normal. No thyromegaly.   Eyes: PERRLA, EOMI, Conjunctiva normal, No discharge.   Neck: Normal range of motion, No stridor, no JVD.   Cardiovascular: Normal heart rate, Normal rhythm, No murmurs, No rubs, No gallops.   Lungs: Clear to auscultation bilaterally   Abdomen: Bowel sounds normal, Soft, , No guarding   Skin: Warm, Dry, No erythema, No rash   Neurologic: Normal motor function, No focal deficits noted, cranial nerves II through XII are normal   Psychiatric: Affect normal, Judgment normal, Mood normal.   Extremities: B/L Peripheral Pulses +, no pitting edema.  Pelvic exam:Normal appearing external female genitalia, normal vaginal epithelium,  (whitish clear discharge. Normal appearing cervix. Bimanual: nontender.  No palpable adnexal masses.  Rectovaginal exam was normal.      Assessment and Plan    1. Encounter for PAP smear.  -Thin prep ordered.  -Normal appearing external female genitalia, normal vaginal epithelium,  whitish clear discharge. Normal appearing cervix. Bimanual: nontender.  No palpable adnexal masses.  Rectovaginal exam was normal.     2. Family history of systemic lupus erythematosus  -Patient's mother recently got diagnosed with lupus.  -ERON negative.      3. PCOS (polycystic ovarian syndrome)  -Patient has a history of PCOS and have no kids and not sexually active.  -Referral to see endocrinology.  -On Metformin.      4.. Anxiety  -On lorazepam currently and patient further instructed to discuss it with her psychiatrist to minimize use of lorazepam please note no prescriptions placed for any opioids or benzos today in the clinic.  -Upcoming appt in July with psych.     4. Depression, " unspecified depression type  -Patient is currently on Lamictal, Venlafaxine and benzodiazepines for anxiety/depression/mood problems.  -Upcoming appointment with psychiatrist on July 17.  -Insurance not covering rexulti and patient feels that might be affecting her mood a little.      5. Eczema, unspecified type  -Triamcinolone cream ointment prescribed.     6. Obesity (BMI 30-39.9)  -Have been on phentermine for a while.  Patient further advised to abstain from the medication given its addictive profile.  No prescriptions given in the clinic today. Referral for nutritionist in place.      7. Family history of ovarian cancer  -Mother as well as mother Sister suffered from ovarian cancer at the age of 30s and 40s.     8. Family history of breast cancer  -Father's sister suffers from breast cancer at a young age between 30s and 40s.  I think would be best to start screening around 40.  -She herself denies any lumps or bumps or nipple discharge.     10. Chronic low back pain, unspecified back pain laterality, with sciatica presence unspecified  On Chronic opiods.  S/p decompression surgery  Further instructed to follow up with spine doctor.     11. Other migraine without status migrainosus, not intractable  On Imitrex.  Increase in frequency although not associated with auras or focal neurological deficits.  Further instructed on talking to psychiatrist about starting medication that can control the migraine as well.      12. Routine adult health maintenance  Routine labs all normal except low Vit D.      13. 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  She would like to repeat the testing again today.  No signs and symptoms of active TB.  Quant Gold ordered.      14. Urinary tract infection with hematuria, site unspecified  UA normal.  Denies any UTI like symptoms.       15. BRCA gene mutation negative  -Per patient tested in 2010 and was negative.      Signed  by: Saul Nguyen M.D.

## 2019-05-24 LAB
CYTOLOGY REG CYTOL: NORMAL
HPV HR 12 DNA CVX QL NAA+PROBE: NEGATIVE
HPV16 DNA SPEC QL NAA+PROBE: NEGATIVE
HPV18 DNA SPEC QL NAA+PROBE: NEGATIVE
SPECIMEN SOURCE: NORMAL

## 2019-06-25 DIAGNOSIS — E28.2 PCOS (POLYCYSTIC OVARIAN SYNDROME): ICD-10-CM

## 2019-06-25 NOTE — TELEPHONE ENCOUNTER
Last seen: 05/22/19 by Dr. Nguyen  Next appt: None    Was the patient seen in the last year in this department? Yes   Does patient have an active prescription for medications requested? No   Received Request Via: Pharmacy

## 2019-07-17 ENCOUNTER — OFFICE VISIT (OUTPATIENT)
Dept: BEHAVIORAL HEALTH | Facility: CLINIC | Age: 34
End: 2019-07-17
Payer: COMMERCIAL

## 2019-07-17 VITALS
WEIGHT: 191 LBS | HEIGHT: 63 IN | SYSTOLIC BLOOD PRESSURE: 122 MMHG | DIASTOLIC BLOOD PRESSURE: 70 MMHG | BODY MASS INDEX: 33.84 KG/M2 | HEART RATE: 70 BPM

## 2019-07-17 DIAGNOSIS — F33.0 MILD EPISODE OF RECURRENT MAJOR DEPRESSIVE DISORDER (HCC): ICD-10-CM

## 2019-07-17 DIAGNOSIS — F41.9 ANXIETY DISORDER, UNSPECIFIED TYPE: ICD-10-CM

## 2019-07-17 PROCEDURE — 99205 OFFICE O/P NEW HI 60 MIN: CPT | Performed by: NURSE PRACTITIONER

## 2019-07-17 RX ORDER — ARIPIPRAZOLE 2 MG/1
2 TABLET ORAL DAILY
Qty: 30 TAB | Refills: 0 | Status: SHIPPED | OUTPATIENT
Start: 2019-07-17 | End: 2019-08-15 | Stop reason: SDUPTHER

## 2019-07-17 RX ORDER — VENLAFAXINE HYDROCHLORIDE 150 MG/1
150 CAPSULE, EXTENDED RELEASE ORAL DAILY
Qty: 90 CAP | Refills: 0 | Status: SHIPPED | OUTPATIENT
Start: 2019-07-17 | End: 2019-09-17

## 2019-07-17 ASSESSMENT — PATIENT HEALTH QUESTIONNAIRE - PHQ9
5. POOR APPETITE OR OVEREATING: 0 - NOT AT ALL
CLINICAL INTERPRETATION OF PHQ2 SCORE: 2
SUM OF ALL RESPONSES TO PHQ QUESTIONS 1-9: 7
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
## 2019-08-15 RX ORDER — ARIPIPRAZOLE 2 MG/1
2 TABLET ORAL DAILY
Qty: 30 TAB | Refills: 0 | Status: SHIPPED | OUTPATIENT
Start: 2019-08-15 | End: 2019-09-17

## 2019-09-10 PROBLEM — F33.0 MDD (MAJOR DEPRESSIVE DISORDER), RECURRENT EPISODE, MILD (HCC): Status: ACTIVE | Noted: 2019-04-15
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
## 2019-09-17 ENCOUNTER — OFFICE VISIT (OUTPATIENT)
Dept: BEHAVIORAL HEALTH | Facility: CLINIC | Age: 34
End: 2019-09-17
Payer: COMMERCIAL

## 2019-09-17 VITALS
WEIGHT: 195 LBS | SYSTOLIC BLOOD PRESSURE: 131 MMHG | HEART RATE: 88 BPM | BODY MASS INDEX: 34.55 KG/M2 | RESPIRATION RATE: 16 BRPM | HEIGHT: 63 IN | DIASTOLIC BLOOD PRESSURE: 98 MMHG

## 2019-09-17 DIAGNOSIS — F33.0 MDD (MAJOR DEPRESSIVE DISORDER), RECURRENT EPISODE, MILD (HCC): ICD-10-CM

## 2019-09-17 DIAGNOSIS — F41.1 GAD (GENERALIZED ANXIETY DISORDER): ICD-10-CM

## 2019-09-17 PROCEDURE — 90833 PSYTX W PT W E/M 30 MIN: CPT | Performed by: NURSE PRACTITIONER

## 2019-09-17 PROCEDURE — 99214 OFFICE O/P EST MOD 30 MIN: CPT | Performed by: NURSE PRACTITIONER

## 2019-09-17 RX ORDER — LORAZEPAM 1 MG/1
1 TABLET ORAL 3 TIMES DAILY
Status: CANCELLED | OUTPATIENT
Start: 2019-09-17

## 2019-09-17 RX ORDER — VENLAFAXINE HYDROCHLORIDE 225 MG/1
225 TABLET, EXTENDED RELEASE ORAL DAILY
Qty: 30 TAB | Refills: 1 | Status: SHIPPED | OUTPATIENT
Start: 2019-09-17 | End: 2019-10-17

## 2019-09-18 ENCOUNTER — APPOINTMENT (OUTPATIENT)
Dept: BEHAVIORAL HEALTH | Facility: CLINIC | Age: 34
End: 2019-09-18
Payer: COMMERCIAL

## 2019-10-17 ENCOUNTER — APPOINTMENT (OUTPATIENT)
Dept: BEHAVIORAL HEALTH | Facility: CLINIC | Age: 34
End: 2019-10-17
Payer: COMMERCIAL

## 2019-10-23 PROBLEM — F41.1 GAD (GENERALIZED ANXIETY DISORDER): Status: ACTIVE | Noted: 2018-02-07

## 2019-11-04 ENCOUNTER — OFFICE VISIT (OUTPATIENT)
Dept: URGENT CARE | Facility: CLINIC | Age: 34
End: 2019-11-04
Payer: COMMERCIAL

## 2019-11-04 VITALS
TEMPERATURE: 99.1 F | HEIGHT: 63 IN | OXYGEN SATURATION: 100 % | SYSTOLIC BLOOD PRESSURE: 110 MMHG | DIASTOLIC BLOOD PRESSURE: 74 MMHG | RESPIRATION RATE: 16 BRPM | HEART RATE: 94 BPM | BODY MASS INDEX: 33.66 KG/M2 | WEIGHT: 190 LBS

## 2019-11-04 DIAGNOSIS — J10.1 INFLUENZA B: ICD-10-CM

## 2019-11-04 DIAGNOSIS — R50.9 FEVER, UNSPECIFIED FEVER CAUSE: ICD-10-CM

## 2019-11-04 LAB
FLUAV+FLUBV AG SPEC QL IA: NORMAL
INT CON NEG: NEGATIVE
INT CON POS: POSITIVE

## 2019-11-04 PROCEDURE — 87804 INFLUENZA ASSAY W/OPTIC: CPT | Performed by: PHYSICIAN ASSISTANT

## 2019-11-04 PROCEDURE — 99214 OFFICE O/P EST MOD 30 MIN: CPT | Performed by: PHYSICIAN ASSISTANT

## 2019-11-04 RX ORDER — VENLAFAXINE 75 MG/1
150 TABLET ORAL 3 TIMES DAILY
COMMUNITY
End: 2019-12-23

## 2019-11-04 ASSESSMENT — ENCOUNTER SYMPTOMS
VOMITING: 0
FEVER: 1
EYE DISCHARGE: 0
MYALGIAS: 1
NAUSEA: 1
SORE THROAT: 1
HEADACHES: 1
SHORTNESS OF BREATH: 0
COUGH: 1
ABDOMINAL PAIN: 0
EYE REDNESS: 0

## 2019-11-04 NOTE — PROGRESS NOTES
Subjective:      Naomi Javed is a 34 y.o. female who presents with Fever (all weekend, had fever 101, taken tylenol, having hard time taking breath, ears hurt, body aches, eras feeling congested)        Fever    This is a new problem. Episode onset: x 3 days ago. The problem occurs constantly. The problem has been unchanged. The maximum temperature noted was 100 to 100.9 F (now resolved). Associated symptoms include congestion, coughing (The patient reports a mildly productive cough.), ear pain, headaches, muscle aches, nausea and a sore throat. Pertinent negatives include no abdominal pain, chest pain, rash or vomiting. She has tried acetaminophen (Genevieve Maramec Plus) for the symptoms. The treatment provided mild relief.     PMH:  has a past medical history of Anxiety, Depression, and PCOS (polycystic ovarian syndrome).  MEDS:   Current Outpatient Medications:   •  venlafaxine (EFFEXOR) 75 MG Tab, Take 150 mg by mouth 3 times a day., Disp: , Rfl:   •  ibuprofen (MOTRIN) 800 MG Tab, Take 800 mg by mouth every 8 hours as needed., Disp: , Rfl:   •  SUMAtriptan (IMITREX) 50 MG Tab, Take 1 Tab by mouth Once PRN for Migraine., Disp: 10 Tab, Rfl: 0  •  triamcinolone acetonide (KENALOG) 0.5 % Cream, Apply 1 Tube to affected area(s) 3 times a day., Disp: 1 Tube, Rfl: 3  •  Etonogestrel (NEXPLANON SC), Inject  as instructed., Disp: , Rfl:   •  LORazepam (ATIVAN) 1 MG Tab, Take 1 mg by mouth 3 times a day., Disp: , Rfl:   •  metFORMIN (GLUCOPHAGE) 850 MG Tab, Take 1 Tab by mouth 2 times a day, with meals. (Patient not taking: Reported on 11/4/2019), Disp: 60 Tab, Rfl: 3  •  HYDROcodone/acetaminophen (NORCO)  MG Tab, Take 1-2 Tabs by mouth every 6 hours as needed., Disp: , Rfl:   ALLERGIES: No Known Allergies  SURGHX:   Past Surgical History:   Procedure Laterality Date   • OTHER      back surgery     SOCHX:  reports that she has never smoked. She has never used smokeless tobacco. She reports current alcohol use. She  "reports that she does not use drugs.  FH: Family history was reviewed, no pertinent findings to report      Review of Systems   Constitutional: Positive for fever.   HENT: Positive for congestion, ear pain and sore throat.    Eyes: Negative for discharge and redness.   Respiratory: Positive for cough (The patient reports a mildly productive cough.). Negative for shortness of breath.    Cardiovascular: Negative for chest pain and leg swelling.   Gastrointestinal: Positive for nausea. Negative for abdominal pain and vomiting.   Musculoskeletal: Positive for myalgias.   Skin: Negative for rash.   Neurological: Positive for headaches.   All other systems reviewed and are negative.         Objective:     /74 (BP Location: Left arm, Patient Position: Sitting, BP Cuff Size: Adult)   Pulse 94   Temp 37.3 °C (99.1 °F) (Temporal)   Resp 16   Ht 1.6 m (5' 3\")   Wt 86.2 kg (190 lb)   SpO2 100%   BMI 33.66 kg/m²      Physical Exam  Constitutional:       General: She is not in acute distress.     Appearance: Normal appearance.   HENT:      Head: Normocephalic and atraumatic.      Right Ear: Tympanic membrane, ear canal and external ear normal.      Left Ear: Tympanic membrane, ear canal and external ear normal.      Nose: Nose normal.      Mouth/Throat:      Mouth: Mucous membranes are moist.      Pharynx: Oropharynx is clear. No posterior oropharyngeal erythema.   Eyes:      Extraocular Movements: Extraocular movements intact.      Conjunctiva/sclera: Conjunctivae normal.   Neck:      Musculoskeletal: Normal range of motion and neck supple.   Cardiovascular:      Rate and Rhythm: Normal rate and regular rhythm.      Heart sounds: Normal heart sounds.   Pulmonary:      Effort: Pulmonary effort is normal. No respiratory distress.      Breath sounds: Normal breath sounds. No wheezing.   Musculoskeletal: Normal range of motion.      Comments: Moves all 4 extremities.    Skin:     General: Skin is warm and dry. "   Neurological:      Mental Status: She is alert and oriented to person, place, and time.            Progress:  POCT Rapid Flu: Positive Flu B     Assessment/Plan:     1. Fever, unspecified fever cause  - POCT Influenza A/B    2. Influenza B  --The patient is currently outside of the window for treatment with Tamiflu.    Differential diagnoses, supportive care, and indications for immediate follow-up discussed with patient.   Instructed to return to clinic or nearest emergency department for any change in condition, further concerns, or worsening of symptoms.    OTC Tylenol or Motrin for fever/discomfort.  OTC cough/cold medication for symptomatic relief  OTC Supportive Care for Congestion - saline nasal spray or neti pot  Drink plenty of fluids  Rest  Follow-up with PCP  AVS printed  Return to clinic or go to the ED if symptoms worsen or fail to improve, or if the patient should develop worsening/increasing cough, congestion, ear pain, sore throat, shortness of breath, wheezing, chest pain, fever/chills, and/or any concerning symptoms.    Discussed plan with the patient, and she agrees to the above.

## 2019-11-04 NOTE — LETTER
[facility]  [address]  [place] [address]     November 4, 2019    Patient: Naomi Javed   YOB: 1985   Date of Visit: 11/4/2019       To Whom It May Concern:    Naomi Javed was seen and treated in our department on 11/4/2019. Please excuse her from work tomorrow, 11/5.    Sincerely,     Ariadne Ugalde P.A.-C.

## 2019-11-05 NOTE — PATIENT INSTRUCTIONS

## 2019-12-23 ENCOUNTER — OFFICE VISIT (OUTPATIENT)
Dept: BEHAVIORAL HEALTH | Facility: CLINIC | Age: 34
End: 2019-12-23
Payer: COMMERCIAL

## 2019-12-23 VITALS
RESPIRATION RATE: 14 BRPM | HEART RATE: 81 BPM | BODY MASS INDEX: 34.55 KG/M2 | HEIGHT: 63 IN | WEIGHT: 195 LBS | DIASTOLIC BLOOD PRESSURE: 84 MMHG | SYSTOLIC BLOOD PRESSURE: 128 MMHG

## 2019-12-23 DIAGNOSIS — F33.0 MDD (MAJOR DEPRESSIVE DISORDER), RECURRENT EPISODE, MILD (HCC): ICD-10-CM

## 2019-12-23 DIAGNOSIS — F41.1 GAD (GENERALIZED ANXIETY DISORDER): ICD-10-CM

## 2019-12-23 PROCEDURE — 99214 OFFICE O/P EST MOD 30 MIN: CPT | Performed by: NURSE PRACTITIONER

## 2019-12-23 RX ORDER — VENLAFAXINE 75 MG/1
75 TABLET ORAL 3 TIMES DAILY
Qty: 90 TAB | Refills: 1 | Status: SHIPPED | OUTPATIENT
Start: 2019-12-23 | End: 2019-12-23

## 2019-12-23 RX ORDER — ARIPIPRAZOLE 2 MG/1
2 TABLET ORAL DAILY
Qty: 30 TAB | Refills: 1 | Status: SHIPPED | OUTPATIENT
Start: 2019-12-23 | End: 2019-12-23

## 2019-12-23 RX ORDER — VENLAFAXINE 75 MG/1
75 TABLET ORAL 3 TIMES DAILY
Qty: 90 TAB | Refills: 1 | Status: SHIPPED | OUTPATIENT
Start: 2019-12-23 | End: 2020-03-23

## 2019-12-23 RX ORDER — ARIPIPRAZOLE 2 MG/1
2 TABLET ORAL DAILY
Qty: 30 TAB | Refills: 1 | Status: SHIPPED | OUTPATIENT
Start: 2019-12-23 | End: 2020-03-23
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
## 2019-12-24 ENCOUNTER — OFFICE VISIT (OUTPATIENT)
Dept: BEHAVIORAL HEALTH | Facility: CLINIC | Age: 34
End: 2019-12-24
Payer: COMMERCIAL

## 2019-12-24 DIAGNOSIS — F33.0 MDD (MAJOR DEPRESSIVE DISORDER), RECURRENT EPISODE, MILD (HCC): ICD-10-CM

## 2019-12-24 DIAGNOSIS — F41.1 GAD (GENERALIZED ANXIETY DISORDER): ICD-10-CM

## 2019-12-24 PROCEDURE — 90791 PSYCH DIAGNOSTIC EVALUATION: CPT | Performed by: SOCIAL WORKER
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
## 2020-03-23 RX ORDER — ARIPIPRAZOLE 2 MG/1
TABLET ORAL
Qty: 30 TAB | Refills: 2 | Status: SHIPPED | OUTPATIENT
Start: 2020-03-23 | End: 2020-07-29

## 2020-03-23 RX ORDER — VENLAFAXINE 75 MG/1
TABLET ORAL
Qty: 90 TAB | Refills: 2 | Status: SHIPPED | OUTPATIENT
Start: 2020-03-23 | End: 2020-07-29

## 2020-07-29 RX ORDER — VENLAFAXINE 75 MG/1
TABLET ORAL
Qty: 90 TAB | Refills: 1 | Status: SHIPPED | OUTPATIENT
Start: 2020-07-29 | End: 2020-10-02

## 2020-07-29 RX ORDER — ARIPIPRAZOLE 2 MG/1
TABLET ORAL
Qty: 30 TAB | Refills: 0 | Status: SHIPPED | OUTPATIENT
Start: 2020-07-29 | End: 2020-10-02

## 2020-10-02 ENCOUNTER — TELEMEDICINE (OUTPATIENT)
Dept: BEHAVIORAL HEALTH | Facility: CLINIC | Age: 35
End: 2020-10-02

## 2020-10-02 VITALS — BODY MASS INDEX: 37.56 KG/M2 | WEIGHT: 212 LBS | HEIGHT: 63 IN

## 2020-10-02 DIAGNOSIS — R41.840 CONCENTRATION DEFICIT: ICD-10-CM

## 2020-10-02 DIAGNOSIS — F33.0 MDD (MAJOR DEPRESSIVE DISORDER), RECURRENT EPISODE, MILD (HCC): ICD-10-CM

## 2020-10-02 DIAGNOSIS — F41.1 GENERALIZED ANXIETY DISORDER: ICD-10-CM

## 2020-10-02 PROBLEM — F41.9 ANXIETY: Status: RESOLVED | Noted: 2018-02-07 | Resolved: 2020-10-02

## 2020-10-02 PROCEDURE — 99214 OFFICE O/P EST MOD 30 MIN: CPT | Mod: 95,CR | Performed by: PSYCHIATRY & NEUROLOGY

## 2020-10-02 RX ORDER — LORAZEPAM 1 MG/1
1 TABLET ORAL
Qty: 15 TAB | Refills: 0 | Status: SHIPPED | OUTPATIENT
Start: 2020-10-02 | End: 2021-07-07 | Stop reason: SDUPTHER

## 2020-10-02 RX ORDER — BUPROPION HYDROCHLORIDE 150 MG/1
150 TABLET ORAL EVERY MORNING
Qty: 30 TAB | Refills: 1 | Status: SHIPPED | OUTPATIENT
Start: 2020-10-02 | End: 2020-12-18

## 2020-10-02 RX ORDER — VENLAFAXINE HYDROCHLORIDE 225 MG/1
225 TABLET, EXTENDED RELEASE ORAL DAILY
Qty: 30 TAB | Refills: 1 | Status: SHIPPED | OUTPATIENT
Start: 2020-10-02 | End: 2020-12-18

## 2020-10-02 ASSESSMENT — FIBROSIS 4 INDEX: FIB4 SCORE: 0.37

## 2020-10-02 NOTE — PROGRESS NOTES
PSYCHIATRY VIRTUAL VISIT FOLLOW-UP NOTE      Chief Complaint   Patient presents with   • Follow-Up     depression, anxiety       This evaluation was conducted via Zoom using secure and encrypted videoconferencing technology. The patient was in a private location in the [facility].    The patient's identity was confirmed and verbal consent was obtained for this virtual visit.    History Of Present Illness:  Naomi Javed is a 35 y.o. old female with generalized anxiety disorder, major depressive disorder, PCOS, migraines comes in today for follow up, was last seen by VALERIE Retana over 9 months ago.  This is her first visit with me.  She has been struggling with depression on and off since her last visit here.  She feels that she did good for a while but lately has noticed some struggles with depression.  She has noticed that she is not finding things as enjoyable as before.  She is also concerned about her concentration which has been an issue since she was a child.  She notices that she spaces out really easily and gets bored easily as well.  She has always been a procrastinator but lately it has started affecting her work as well.  She is also endorsing fatigue on a day-to-day basis.  She stopped taking Abilify a few months ago as she was not remembering to take it.  She has been compliant with Effexor but is not endorsing the same effect as she was noticing before.  She has Ativan which he uses on an infrequent basis for overwhelming anxiety.  She denies any new psychosocial stressors.  She been doing all right in regards to her sleep.  She denies having thoughts to hurt herself or others.    Social History:   She is single,  and  x 1, no kids, lives with her sister and her 2 kids in [place], employed full time as a .    Substance Use:  Alcohol - Infrequent, once every few months   Nicotine - Denies  Cannabis - Denies  Illicit drugs - Denies     Past Medication  "Trials:  Prozac (s/e - sexual dysfunction), Celexa (ineffective), Lamictal, Abilify, Rexulti (effective)    Medications:  Current Outpatient Medications   Medication Sig Dispense Refill   • LORazepam (ATIVAN) 1 MG Tab Take 1 Tab by mouth 1 time daily as needed for Anxiety for up to 30 days. 15 Tab 0   • buPROPion (WELLBUTRIN XL) 150 MG XL tablet Take 1 Tab by mouth every morning. 30 Tab 1   • venlafaxine ER (EFFEXOR) 225 MG TABLET SR 24 HR tablet Take 1 Tab by mouth every day. 30 Tab 1   • ibuprofen (MOTRIN) 800 MG Tab Take 800 mg by mouth every 8 hours as needed.     • SUMAtriptan (IMITREX) 50 MG Tab Take 1 Tab by mouth Once PRN for Migraine. 10 Tab 0   • metFORMIN (GLUCOPHAGE) 850 MG Tab Take 1 Tab by mouth 2 times a day, with meals. (Patient not taking: Reported on 11/4/2019) 60 Tab 3   • triamcinolone acetonide (KENALOG) 0.5 % Cream Apply 1 Tube to affected area(s) 3 times a day. 1 Tube 3     No current facility-administered medications for this visit.        Review Of Systems:    Constitutional - Positive for fatigue  Respiratory - Negative for shortness of breath, cough  CVS - Negative for chest pain, palpitations  GI - Negative for nausea, vomiting, abdominal pain, diarrhea, constipation  Musculoskeletal - Positive for low back pain  Neurological - Negative for headaches  Psychiatric - Positive for infrequent depression, anxiety, poor concentration     Physical Examination:  Vital signs: Ht 1.6 m (5' 3\")   Wt 96.2 kg (212 lb)   LMP  (LMP Unknown)   Breastfeeding No   BMI 37.55 kg/m²     Musculoskeletal: No abnormal movements.     Mental Status Evaluation:   General: Young female, dressed in casual attire, good grooming and hygiene, in no apparent distress, calm and cooperative, good eye contact, no psychomotor agitation or retardation  Orientation: Alert and oriented to person, place and time  Recent and remote memory: Grossly intact  Attention span and concentration: Grossly intact  Speech: Spontaneous, " "normal rate, rhythm and tone  Thought Process: Linear, logical and goal directed  Thought Content: Denies suicidal or homicidal ideations, intent or plan  Perception: Denies auditory or visual hallucinations. No delusions noted  Associations: Intact  Language: Appropriate  Fund of knowledge and vocabulary: Grossly adequate  Mood: \"alright\"  Affect: Euthymic, mood congruent  Insight: Good  Judgment: Good    Depression screening:  Depression Screen (PHQ-2/PHQ-9) 2/7/2018 5/22/2019 7/17/2019   PHQ-2 Total Score 1 0 2   PHQ-9 Total Score 8 - 7     Interpretation of PHQ-9 Total Score   Score Severity   1-4 No Depression   5-9 Mild Depression   10-14 Moderate Depression   15-19 Moderately Severe Depression   20-27 Severe Depression    Medical Records/Labs/Diagnostic Tests Reviewed:  [place]  records - no prescribed controlled medications found in the last 2 years      Impression:  1.  Major depressive disorder, recurrent, mild - stable  2.  Generalized anxiety disorder - stable  3.  Concentration deficit - stable    Plan:  1.  Continue Effexor but switch formulation to  mg daily for anxiety and depression.    2.  Start Wellbutrin  mg in the morning for anxiety and depression.  She denies a history of seizure disorder or eating disorder.  Discussed side effects including anxiety, agitation, headaches, palpitations, decreased appetite, weight loss, insomnia etc.  I let her know that Wellbutrin might help with her fatigue and concentration as well.  3.  Referral placed for neuropsychological testing to rule out ADHD  4.  Discontinue Abilify since she is no longer taking it  5.  Continue Ativan 1 mg daily as needed for anxiety.  E-prescribed 15 tablets with no refills.    Return to clinic in 6 weeks or sooner if symptoms worsen    The proposed treatment plan was discussed with the patient who was provided the opportunity to ask questions and make suggestions regarding alternative treatment. Patient verbalized " understanding and expressed agreement with the plan.     Radha Burns M.D.  10/02/20    This note was created using voice recognition software (Dragon). The accuracy of the dictation is limited by the abilities of the software. I have reviewed the note prior to signing, however some errors in grammar and context are still possible. If you have any questions related to this note please do not hesitate to contact our office.

## 2020-11-01 SDOH — ECONOMIC STABILITY: HOUSING INSECURITY
IN THE LAST 12 MONTHS, WAS THERE A TIME WHEN YOU DID NOT HAVE A STEADY PLACE TO SLEEP OR SLEPT IN A SHELTER (INCLUDING NOW)?: NO

## 2020-11-01 SDOH — ECONOMIC STABILITY: TRANSPORTATION INSECURITY
IN THE PAST 12 MONTHS, HAS LACK OF RELIABLE TRANSPORTATION KEPT YOU FROM MEDICAL APPOINTMENTS, MEETINGS, WORK OR FROM GETTING THINGS NEEDED FOR DAILY LIVING?: NO

## 2020-11-01 SDOH — HEALTH STABILITY: PHYSICAL HEALTH: ON AVERAGE, HOW MANY MINUTES DO YOU ENGAGE IN EXERCISE AT THIS LEVEL?: 0 MINUTES

## 2020-11-01 SDOH — HEALTH STABILITY: PHYSICAL HEALTH: ON AVERAGE, HOW MANY DAYS PER WEEK DO YOU ENGAGE IN MODERATE TO STRENUOUS EXERCISE (LIKE A BRISK WALK)?: 0 DAYS

## 2020-11-01 SDOH — ECONOMIC STABILITY: HOUSING INSECURITY: IN THE LAST 12 MONTHS, HOW MANY PLACES HAVE YOU LIVED?: 1

## 2020-11-01 SDOH — ECONOMIC STABILITY: INCOME INSECURITY: IN THE LAST 12 MONTHS, WAS THERE A TIME WHEN YOU WERE NOT ABLE TO PAY THE MORTGAGE OR RENT ON TIME?: YES

## 2020-11-01 SDOH — ECONOMIC STABILITY: HOUSING INSECURITY
IN THE LAST 12 MONTHS, WAS THERE A TIME WHEN YOU DID NOT HAVE A STEADY PLACE TO SLEEP OR SLEPT IN A SHELTER (INCLUDING NOW)?: YES

## 2020-11-01 SDOH — HEALTH STABILITY: MENTAL HEALTH
STRESS IS WHEN SOMEONE FEELS TENSE, NERVOUS, ANXIOUS, OR CAN'T SLEEP AT NIGHT BECAUSE THEIR MIND IS TROUBLED. HOW STRESSED ARE YOU?: NOT AT ALL

## 2020-11-01 SDOH — ECONOMIC STABILITY: TRANSPORTATION INSECURITY
IN THE PAST 12 MONTHS, HAS THE LACK OF TRANSPORTATION KEPT YOU FROM MEDICAL APPOINTMENTS OR FROM GETTING MEDICATIONS?: NO

## 2020-11-01 ASSESSMENT — SOCIAL DETERMINANTS OF HEALTH (SDOH)
WITHIN THE PAST 12 MONTHS, THE FOOD YOU BOUGHT JUST DIDN'T LAST AND YOU DIDN'T HAVE MONEY TO GET MORE: NEVER TRUE
HOW OFTEN DO YOU GET TOGETHER WITH FRIENDS OR RELATIVES?: ONCE A WEEK
WITHIN THE PAST 12 MONTHS, YOU WORRIED THAT YOUR FOOD WOULD RUN OUT BEFORE YOU GOT THE MONEY TO BUY MORE: NEVER TRUE
DO YOU BELONG TO ANY CLUBS OR ORGANIZATIONS SUCH AS CHURCH GROUPS UNIONS, FRATERNAL OR ATHLETIC GROUPS, OR SCHOOL GROUPS?: NO
IN A TYPICAL WEEK, HOW MANY TIMES DO YOU TALK ON THE PHONE WITH FAMILY, FRIENDS, OR NEIGHBORS?: ONCE A WEEK
HOW OFTEN DO YOU ATTEND CHURCH OR RELIGIOUS SERVICES?: MORE THAN 4 TIMES PER YEAR
HOW OFTEN DO YOU HAVE SIX OR MORE DRINKS ON ONE OCCASION: NEVER
HOW OFTEN DO YOU ATTENT MEETINGS OF THE CLUB OR ORGANIZATION YOU BELONG TO?: NEVER
HOW HARD IS IT FOR YOU TO PAY FOR THE VERY BASICS LIKE FOOD, HOUSING, MEDICAL CARE, AND HEATING?: SOMEWHAT HARD
HOW MANY DRINKS CONTAINING ALCOHOL DO YOU HAVE ON A TYPICAL DAY WHEN YOU ARE DRINKING: 3 OR 4
HOW OFTEN DO YOU HAVE A DRINK CONTAINING ALCOHOL: MONTHLY OR LESS

## 2020-11-02 ENCOUNTER — HOSPITAL ENCOUNTER (OUTPATIENT)
Dept: LAB | Facility: MEDICAL CENTER | Age: 35
End: 2020-11-02
Attending: FAMILY MEDICINE
Payer: COMMERCIAL

## 2020-11-02 ENCOUNTER — OFFICE VISIT (OUTPATIENT)
Dept: MEDICAL GROUP | Facility: PHYSICIAN GROUP | Age: 35
End: 2020-11-02
Payer: COMMERCIAL

## 2020-11-02 VITALS
BODY MASS INDEX: 36.86 KG/M2 | DIASTOLIC BLOOD PRESSURE: 80 MMHG | HEIGHT: 63 IN | OXYGEN SATURATION: 97 % | HEART RATE: 88 BPM | SYSTOLIC BLOOD PRESSURE: 110 MMHG | TEMPERATURE: 97.1 F | RESPIRATION RATE: 16 BRPM | WEIGHT: 208 LBS

## 2020-11-02 DIAGNOSIS — E28.2 PCOS (POLYCYSTIC OVARIAN SYNDROME): ICD-10-CM

## 2020-11-02 DIAGNOSIS — R73.03 PREDIABETES: ICD-10-CM

## 2020-11-02 DIAGNOSIS — L30.9 ECZEMA, UNSPECIFIED TYPE: ICD-10-CM

## 2020-11-02 DIAGNOSIS — G43.809 OTHER MIGRAINE WITHOUT STATUS MIGRAINOSUS, NOT INTRACTABLE: ICD-10-CM

## 2020-11-02 DIAGNOSIS — Z23 NEED FOR VACCINATION: ICD-10-CM

## 2020-11-02 LAB
ALBUMIN SERPL BCP-MCNC: 3.9 G/DL (ref 3.2–4.9)
ALBUMIN/GLOB SERPL: 1.3 G/DL
ALP SERPL-CCNC: 68 U/L (ref 30–99)
ALT SERPL-CCNC: 41 U/L (ref 2–50)
ANION GAP SERPL CALC-SCNC: 7 MMOL/L (ref 7–16)
AST SERPL-CCNC: 20 U/L (ref 12–45)
BILIRUB SERPL-MCNC: 0.3 MG/DL (ref 0.1–1.5)
BUN SERPL-MCNC: 13 MG/DL (ref 8–22)
CALCIUM SERPL-MCNC: 8.8 MG/DL (ref 8.5–10.5)
CHLORIDE SERPL-SCNC: 107 MMOL/L (ref 96–112)
CHOLEST SERPL-MCNC: 135 MG/DL (ref 100–199)
CO2 SERPL-SCNC: 25 MMOL/L (ref 20–33)
CREAT SERPL-MCNC: 0.58 MG/DL (ref 0.5–1.4)
ERYTHROCYTE [DISTWIDTH] IN BLOOD BY AUTOMATED COUNT: 40 FL (ref 35.9–50)
EST. AVERAGE GLUCOSE BLD GHB EST-MCNC: 108 MG/DL
FASTING STATUS PATIENT QL REPORTED: NORMAL
GLOBULIN SER CALC-MCNC: 3 G/DL (ref 1.9–3.5)
GLUCOSE SERPL-MCNC: 108 MG/DL (ref 65–99)
HBA1C MFR BLD: 5.4 % (ref 0–5.6)
HCT VFR BLD AUTO: 45.1 % (ref 37–47)
HDLC SERPL-MCNC: 39 MG/DL
HGB BLD-MCNC: 15 G/DL (ref 12–16)
LDLC SERPL CALC-MCNC: 83 MG/DL
MCH RBC QN AUTO: 30.7 PG (ref 27–33)
MCHC RBC AUTO-ENTMCNC: 33.3 G/DL (ref 33.6–35)
MCV RBC AUTO: 92.4 FL (ref 81.4–97.8)
PLATELET # BLD AUTO: 306 K/UL (ref 164–446)
PMV BLD AUTO: 10 FL (ref 9–12.9)
POTASSIUM SERPL-SCNC: 4 MMOL/L (ref 3.6–5.5)
PROT SERPL-MCNC: 6.9 G/DL (ref 6–8.2)
RBC # BLD AUTO: 4.88 M/UL (ref 4.2–5.4)
SODIUM SERPL-SCNC: 139 MMOL/L (ref 135–145)
TRIGL SERPL-MCNC: 65 MG/DL (ref 0–149)
TSH SERPL DL<=0.005 MIU/L-ACNC: 1.22 UIU/ML (ref 0.38–5.33)
WBC # BLD AUTO: 5.5 K/UL (ref 4.8–10.8)

## 2020-11-02 PROCEDURE — 80053 COMPREHEN METABOLIC PANEL: CPT

## 2020-11-02 PROCEDURE — 85027 COMPLETE CBC AUTOMATED: CPT

## 2020-11-02 PROCEDURE — 80061 LIPID PANEL: CPT

## 2020-11-02 PROCEDURE — 36415 COLL VENOUS BLD VENIPUNCTURE: CPT

## 2020-11-02 PROCEDURE — 84443 ASSAY THYROID STIM HORMONE: CPT

## 2020-11-02 PROCEDURE — 99214 OFFICE O/P EST MOD 30 MIN: CPT | Mod: 25 | Performed by: FAMILY MEDICINE

## 2020-11-02 PROCEDURE — 83036 HEMOGLOBIN GLYCOSYLATED A1C: CPT

## 2020-11-02 PROCEDURE — 90471 IMMUNIZATION ADMIN: CPT | Performed by: FAMILY MEDICINE

## 2020-11-02 PROCEDURE — 90686 IIV4 VACC NO PRSV 0.5 ML IM: CPT | Performed by: FAMILY MEDICINE

## 2020-11-02 RX ORDER — SUMATRIPTAN 50 MG/1
50 TABLET, FILM COATED ORAL
Qty: 10 TAB | Refills: 3 | Status: SHIPPED | OUTPATIENT
Start: 2020-11-02 | End: 2021-09-09

## 2020-11-02 RX ORDER — TRIAMCINOLONE ACETONIDE 5 MG/G
CREAM TOPICAL
Qty: 15 G | Refills: 6 | Status: SHIPPED | OUTPATIENT
Start: 2020-11-02 | End: 2021-12-14

## 2020-11-02 ASSESSMENT — FIBROSIS 4 INDEX: FIB4 SCORE: 0.37

## 2020-11-02 NOTE — ASSESSMENT & PLAN NOTE
This is a chronic issue  The patient has a hx of migraines once weekly  Is already on Effexor  Also on Imitrex as needed once per week as needed without any unwanted side effects

## 2020-11-02 NOTE — ASSESSMENT & PLAN NOTE
This is a chronic issue  The patient has a hx of PCOS and was previously managed by endocrinology in [facility]. She would like to establish with endocrinology locally  Metformin 850mg BID  This helps with her symptoms

## 2020-11-02 NOTE — PROGRESS NOTES
Subjective:     Chief Complaint   Patient presents with   • Establish Care     med refill   • Referral Needed     endo        HPI:   Naomi presents today to discuss the following.  Prior PCP: None.    PCOS (polycystic ovarian syndrome)  This is a chronic issue  The patient has a hx of PCOS and was previously managed by endocrinology in [facility]. She would like to establish with endocrinology locally  Metformin 850mg BID  This helps with her symptoms     Prediabetes  This is a chronic issue  The patient has known prediabetes  Is on metformin 850mg BID  Due for repeat labs    Nonintractable migraine  This is a chronic issue  The patient has a hx of migraines once weekly  Is already on Effexor  Also on Imitrex as needed once per week as needed without any unwanted side effects      Past Medical History:   Diagnosis Date   • Anxiety    • Depression    • PCOS (polycystic ovarian syndrome)        Social History     Tobacco Use   • Smoking status: Never Smoker   • Smokeless tobacco: Never Used   Substance Use Topics   • Alcohol use: Yes     Frequency: Monthly or less     Drinks per session: 3 or 4     Binge frequency: Never     Comment: rarely, one drink every 2 months   • Drug use: No       Current Outpatient Medications Ordered in Epic   Medication Sig Dispense Refill   • metFORMIN (GLUCOPHAGE) 850 MG Tab Take 1 Tab by mouth 2 times a day, with meals for 90 days. 180 Tab 3   • triamcinolone acetonide (KENALOG) 0.5 % Cream Apply to affected areas twice daily for up to 14 days 15 g 6   • SUMAtriptan (IMITREX) 50 MG Tab Take 1 Tab by mouth Once PRN for Migraine. 10 Tab 3   • buPROPion (WELLBUTRIN XL) 150 MG XL tablet Take 1 Tab by mouth every morning. 30 Tab 1   • venlafaxine ER (EFFEXOR) 225 MG TABLET SR 24 HR tablet Take 1 Tab by mouth every day. 30 Tab 1     No current Epic-ordered facility-administered medications on file.        Allergies:  Patient has no known allergies.    Health Maintenance:  "Completed    ROS:  Gen: no fevers/chills, no changes in weight  Eyes: no changes in vision  ENT: no sore throat, no hearing loss, no bloody nose  Pulm: no sob, no cough  CV: no chest pain, no palpitations  GI: no nausea/vomiting, no diarrhea      Objective:     Exam:  /80 (BP Location: Left arm, Patient Position: Sitting)   Pulse 88   Temp 36.2 °C (97.1 °F) (Temporal)   Resp 16   Ht 1.6 m (5' 3\")   Wt 94.3 kg (208 lb)   SpO2 97%   BMI 36.85 kg/m²  Body mass index is 36.85 kg/m².    Constitutional: Alert, no distress, well-groomed.  Skin: Warm, dry, good turgor, no rashes in visible areas.  Eye: Equal, round and reactive, conjunctiva clear, lids normal.  ENMT: Lips without lesions, good dentition, moist mucous membranes.  Neck: Trachea midline, no masses, no thyromegaly.  Respiratory: Unlabored respiratory effort, no cough.  MSK: Normal gait, moves all extremities.  Neuro: Grossly non-focal.   Psych: Alert and oriented x3, normal affect and mood.      Assessment & Plan:     35 y.o. female with the following -     1. Need for vaccination  - Influenza Vaccine Quad Injection (PF)    2. PCOS (polycystic ovarian syndrome)  This is a chronic, stable condition.  The patient has a longstanding history of PCOS previously managed by endocrinology in [facility].  At this time I recommend that she establishes with gynecology and endocrinology locally.  She is on Metformin 850 mg twice daily.  I would like to establish baseline labs today as well.  She has a Nexplanon that is past due for removal.  Will be referred to gynecology for this.  - CBC WITHOUT DIFFERENTIAL; Future  - Comp Metabolic Panel; Future  - HEMOGLOBIN A1C; Future  - TSH WITH REFLEX TO FT4; Future  - Lipid Profile; Future  - REFERRAL TO OB/GYN  - REFERRAL TO ENDOCRINOLOGY  - metFORMIN (GLUCOPHAGE) 850 MG Tab; Take 1 Tab by mouth 2 times a day, with meals for 90 days.  Dispense: 180 Tab; Refill: 3    3. Prediabetes  This is a chronic, stable " condition.  The patient has a history of prediabetes with most recent A1c within normal limits May 2018.  She is on Metformin presently.  I will repeat labs today.  - CBC WITHOUT DIFFERENTIAL; Future  - Comp Metabolic Panel; Future  - HEMOGLOBIN A1C; Future  - TSH WITH REFLEX TO FT4; Future  - Lipid Profile; Future    4. Eczema, unspecified type  This is a chronic, stable condition.  On Kenalog cream as needed.  Refills requested today.  - triamcinolone acetonide (KENALOG) 0.5 % Cream; Apply to affected areas twice daily for up to 14 days  Dispense: 15 g; Refill: 6    5. Other migraine without status migrainosus, not intractable  This is a chronic, stable condition.  She is on Effexor for for depression but also helps to prevent migraines.  She is also on sumatriptan as needed.  I recommend against taking these 2 at the same time to decrease the risk of serotonin syndrome.  Emergency room precautions were given today.  - SUMAtriptan (IMITREX) 50 MG Tab; Take 1 Tab by mouth Once PRN for Migraine.  Dispense: 10 Tab; Refill: 3      Return in about 3 months (around 2/2/2021).    Please note that this dictation was created using voice recognition software. I have made every reasonable attempt to correct obvious errors, but I expect that there are errors of grammar and possibly content that I did not discover before finalizing the note.

## 2020-11-02 NOTE — ASSESSMENT & PLAN NOTE
This is a chronic issue  The patient has known prediabetes  Is on metformin 850mg BID  Due for repeat labs

## 2021-01-29 ENCOUNTER — TELEPHONE (OUTPATIENT)
Dept: MEDICAL GROUP | Facility: PHYSICIAN GROUP | Age: 36
End: 2021-01-29

## 2021-01-29 NOTE — TELEPHONE ENCOUNTER
ESTABLISHED PATIENT PRE-VISIT PLANNING     Patient was NOT contacted to complete PVP.     Note: Patient will not be contacted if there is no indication to call.     1.  Reviewed notes from the last few office visits within the medical group: Yes    2.  If any orders were placed at last visit or intended to be done for this visit (i.e. 6 mos follow-up), do we have Results/Consult Notes?         •  Labs - Labs ordered, completed on 11/02/2020 and results are in chart.  Note: If patient appointment is for lab review and patient did not complete labs, check with provider if OK to reschedule patient until labs completed.       •  Imaging - Imaging was not ordered at last office visit.       •  Referrals - Referral ordered, patient has NOT been seen.    3. Is this appointment scheduled as a Hospital Follow-Up? No    4.  Immunizations were updated in Epic using Reconcile Outside Information activity? No    5.  Patient is due for the following Health Maintenance Topics:   Health Maintenance Due   Topic Date Due   • URINE ACR / MICROALBUMIN  03/05/1985   • DIABETES MONOFILAMENT / LE EXAM  03/05/1985   • RETINAL SCREENING  09/05/2002   • IMM HEP B VACCINE (3 of 3 - Risk 3-dose series) 01/07/2007   • IMM DTaP/Tdap/Td Vaccine (2 - Td) 08/31/2019       - Patient plans to schedule appointment for Immunizations: HEPATITIS B and TDAP and Retinal Eye Exam.    6.  AHA (Pulse8) form printed for Provider? N/A

## 2021-02-02 ENCOUNTER — TELEMEDICINE (OUTPATIENT)
Dept: MEDICAL GROUP | Facility: PHYSICIAN GROUP | Age: 36
End: 2021-02-02
Payer: COMMERCIAL

## 2021-02-02 VITALS — WEIGHT: 204 LBS | HEIGHT: 63 IN | HEART RATE: 88 BPM | BODY MASS INDEX: 36.14 KG/M2

## 2021-02-02 DIAGNOSIS — R39.9 UTI SYMPTOMS: ICD-10-CM

## 2021-02-02 DIAGNOSIS — F41.1 GENERALIZED ANXIETY DISORDER: ICD-10-CM

## 2021-02-02 DIAGNOSIS — E28.2 PCOS (POLYCYSTIC OVARIAN SYNDROME): ICD-10-CM

## 2021-02-02 DIAGNOSIS — R73.03 PREDIABETES: ICD-10-CM

## 2021-02-02 PROCEDURE — 99214 OFFICE O/P EST MOD 30 MIN: CPT | Mod: 95,CR | Performed by: FAMILY MEDICINE

## 2021-02-02 RX ORDER — NITROFURANTOIN 25; 75 MG/1; MG/1
100 CAPSULE ORAL 2 TIMES DAILY
Qty: 10 CAP | Refills: 0 | Status: SHIPPED | OUTPATIENT
Start: 2021-02-02 | End: 2021-02-07

## 2021-02-02 ASSESSMENT — FIBROSIS 4 INDEX: FIB4 SCORE: 0.37

## 2021-02-02 NOTE — PROGRESS NOTES
Virtual Visit: Established Patient   This visit was conducted via [employer] using secure and encrypted videoconferencing technology. The patient was in a private location in the state of [place].    The patient's identity was confirmed and verbal consent was obtained for this virtual visit.    Subjective:   CC:   Chief Complaint   Patient presents with   • Follow-Up       Naomi Javed is a 35 y.o. female presenting for evaluation and management of:    #preDM  This is a chronic issue  Last A1c 11/20 at 5.4 off metformin   She has not been taken metformin for quite some time    #PCOS   This is a chronic issue  Still trying to establish with endocrinology and gyn  Stable at this time  Due for nexplanon removal    #Anxiety  This is a chronic issue  On effexor and wellbutrin   Managed by psychiatry.    #UTI symptoms  New problem  Started having symptoms about 2 weeks ago  Urinary frequency and some dysuria   No hematuria   No recent hx of frequent uti     ROS   Denies any recent fevers or chills. No nausea or vomiting. No chest pains or shortness of breath.     No Known Allergies    Current medicines (including changes today)  Current Outpatient Medications   Medication Sig Dispense Refill   • nitrofurantoin (MACROBID) 100 MG Cap Take 1 Cap by mouth 2 times a day for 5 days. 10 Cap 0   • buPROPion (WELLBUTRIN XL) 150 MG XL tablet Take 1 Tab by mouth every morning. 30 Tab 1   • venlafaxine ER (EFFEXOR) 225 MG TABLET SR 24 HR tablet Take 1 Tab by mouth every day. 30 Tab 1   • triamcinolone acetonide (KENALOG) 0.5 % Cream Apply to affected areas twice daily for up to 14 days 15 g 6   • SUMAtriptan (IMITREX) 50 MG Tab Take 1 Tab by mouth Once PRN for Migraine. 10 Tab 3     No current facility-administered medications for this visit.        Patient Active Problem List    Diagnosis Date Noted   • Prediabetes 11/02/2020   • Concentration deficit 10/02/2020   • BRCA gene mutation negative 05/22/2019   • Family history  "of systemic lupus erythematosus 04/15/2019   • MDD (major depressive disorder), recurrent episode, mild (HCC) 04/15/2019   • Family history of ovarian cancer 04/15/2019   • Family history of breast cancer 04/15/2019   • Nonintractable migraine 04/15/2019   • Positive TB test 04/15/2019   • Obesity (BMI 30-39.9) 06/19/2018   • SANDI (generalized anxiety disorder) 02/07/2018   • PCOS (polycystic ovarian syndrome) 02/07/2018   • Eczema 02/07/2018   • Chronic low back pain 02/07/2018       Family History   Problem Relation Age of Onset   • Diabetes Mother    • Cancer Mother         ovarian   • Anxiety disorder Mother    • Depression Mother    • Cancer Paternal Aunt         breast   • Heart Disease Maternal Grandmother    • Cancer Paternal Grandmother         pancreatic   • Alcohol abuse Father    • Alcohol abuse Brother    • Schizophrenia Brother    • Anxiety disorder Brother    • Depression Brother        She  has a past medical history of Anxiety, Depression, and PCOS (polycystic ovarian syndrome).  She  has a past surgical history that includes other.       Objective:   Pulse 88 Comment: pt reported  Ht 1.6 m (5' 3\") Comment: pt reported  Wt 92.5 kg (204 lb) Comment: pt reported  BMI 36.14 kg/m²     Physical Exam:  Constitutional: Alert, no distress, well-groomed.  Skin: No rashes in visible areas.  Eye: Round. Conjunctiva clear, lids normal. No icterus.   ENMT: Lips pink without lesions, good dentition, moist mucous membranes. Phonation normal.  Neck: No masses, no thyromegaly. Moves freely without pain.  Respiratory: Unlabored respiratory effort, no cough or audible wheeze  Psych: Alert and oriented x3, normal affect and mood.       Assessment and Plan:   The following treatment plan was discussed:     1. Prediabetes  Chronic, stable condition.  Prediabetes essentially reversed currently with a A1c of 5.4 off Metformin.  We will plan on repeating A1c in 3 months to ensure stability.  - HEMOGLOBIN A1C; Future    2. " PCOS (polycystic ovarian syndrome)  This is a chronic, stable condition.  Continues to struggle to establish with gynecology for this issue.  She has a Nexplanon in place that is also due for removal.  I will place a new GYN office referral today.  - REFERRAL TO OB/GYN    3. SANDI (generalized anxiety disorder)  This is a chronic, stable condition.  Managed by psychiatry.  Well managed with Wellbutrin and Effexor.    4. UTI symptoms  This is a new problem.  Has been experiencing UTI symptoms.  No recent history of frequent UTIs.  We will treat empirically with Macrobid for 5 days.  Return precautions given today.  - nitrofurantoin (MACROBID) 100 MG Cap; Take 1 Cap by mouth 2 times a day for 5 days.  Dispense: 10 Cap; Refill: 0        Follow-up: Return in about 6 months (around 8/2/2021).

## 2021-02-16 ENCOUNTER — TELEMEDICINE (OUTPATIENT)
Dept: BEHAVIORAL HEALTH | Facility: CLINIC | Age: 36
End: 2021-02-16
Payer: COMMERCIAL

## 2021-02-16 VITALS — HEIGHT: 63 IN | BODY MASS INDEX: 35.61 KG/M2 | WEIGHT: 201 LBS

## 2021-02-16 DIAGNOSIS — F41.1 GENERALIZED ANXIETY DISORDER: ICD-10-CM

## 2021-02-16 DIAGNOSIS — F33.41 MDD (MAJOR DEPRESSIVE DISORDER), RECURRENT, IN PARTIAL REMISSION (HCC): ICD-10-CM

## 2021-02-16 PROCEDURE — 96127 BRIEF EMOTIONAL/BEHAV ASSMT: CPT | Mod: 95,CR | Performed by: PSYCHIATRY & NEUROLOGY

## 2021-02-16 PROCEDURE — 99214 OFFICE O/P EST MOD 30 MIN: CPT | Mod: 95,CR | Performed by: PSYCHIATRY & NEUROLOGY

## 2021-02-16 RX ORDER — BUPROPION HYDROCHLORIDE 150 MG/1
150 TABLET ORAL EVERY MORNING
Qty: 90 TABLET | Refills: 0 | Status: SHIPPED | OUTPATIENT
Start: 2021-02-16 | End: 2021-02-23 | Stop reason: SDUPTHER

## 2021-02-16 RX ORDER — VENLAFAXINE HYDROCHLORIDE 225 MG/1
225 TABLET, EXTENDED RELEASE ORAL DAILY
Qty: 90 TABLET | Refills: 0 | Status: SHIPPED | OUTPATIENT
Start: 2021-02-16 | End: 2021-02-23 | Stop reason: ALTCHOICE

## 2021-02-16 ASSESSMENT — PATIENT HEALTH QUESTIONNAIRE - PHQ9
8. MOVING OR SPEAKING SO SLOWLY THAT OTHER PEOPLE COULD HAVE NOTICED. OR THE OPPOSITE, BEING SO FIGETY OR RESTLESS THAT YOU HAVE BEEN MOVING AROUND A LOT MORE THAN USUAL: NOT AT ALL
SUM OF ALL RESPONSES TO PHQ9 QUESTIONS 1 AND 2: 0
2. FEELING DOWN, DEPRESSED, IRRITABLE, OR HOPELESS: NOT AT ALL
7. TROUBLE CONCENTRATING ON THINGS, SUCH AS READING THE NEWSPAPER OR WATCHING TELEVISION: MORE THAN HALF THE DAYS
6. FEELING BAD ABOUT YOURSELF - OR THAT YOU ARE A FAILURE OR HAVE LET YOURSELF OR YOUR FAMILY DOWN: NOT AL ALL
4. FEELING TIRED OR HAVING LITTLE ENERGY: MORE THAN HALF THE DAYS
3. TROUBLE FALLING OR STAYING ASLEEP OR SLEEPING TOO MUCH: MORE THAN HALF THE DAYS
SUM OF ALL RESPONSES TO PHQ QUESTIONS 1-9: 6
1. LITTLE INTEREST OR PLEASURE IN DOING THINGS: NOT AT ALL
5. POOR APPETITE OR OVEREATING: NOT AT ALL
9. THOUGHTS THAT YOU WOULD BE BETTER OFF DEAD, OR OF HURTING YOURSELF: NOT AT ALL

## 2021-02-16 ASSESSMENT — FIBROSIS 4 INDEX: FIB4 SCORE: 0.37

## 2021-02-16 NOTE — PROGRESS NOTES
PSYCHIATRY VIRTUAL VISIT FOLLOW-UP NOTE      Chief Complaint   Patient presents with   • Follow-Up     depression, anxiety       This evaluation was conducted via Zoom using secure and encrypted videoconferencing technology. The patient was in a private location in the [facility].    The patient's identity was confirmed and verbal consent was obtained for this virtual visit.    History Of Present Illness:  Naomi Javed is a 35 y.o. female with generalized anxiety disorder, major depressive disorder, PCOS, migraines comes in today for follow up, was last seen over 4 months ago.  She has been doing really good in regards to her depression and anxiety since her last visit with me.  She has been compliant with Wellbutrin and feels that it has made her more happy.  She denies any side effect from Wellbutrin or Effexor.  She has been living by herself for the last several months as her sister moved to [place].  She is doing well living alone and continues to stay in touch with her sister on a daily basis.  She denies any work-related stressors as well.  She is not sure if she has noticed any changes in her focus with addition of Wellbutrin but is happy with the positive effects.  She continues to use Ativan on very infrequent basis for overwhelming anxiety.  She is doing all right in regards to her sleep, there are days where she struggles with sleep maintenance.  She denies having thoughts of wanting to hurt herself or others.    Social History:   She is single,  and  x 1, no kids, lives alone in [place], employed full time as a .    Substance Use:  Alcohol - Infrequent, once every few months   Nicotine - Denies  Cannabis - Denies  Illicit drugs - Denies     Past Medication Trials:  Prozac (s/e - sexual dysfunction), Celexa (ineffective), Lamictal, Abilify, Rexulti (effective)    Medications:  Current Outpatient Medications   Medication Sig Dispense Refill   • buPROPion (WELLBUTRIN XL)  "150 MG XL tablet Take 1 Tab by mouth every morning. 30 Tab 1   • venlafaxine ER (EFFEXOR) 225 MG TABLET SR 24 HR tablet Take 1 Tab by mouth every day. 30 Tab 1   • triamcinolone acetonide (KENALOG) 0.5 % Cream Apply to affected areas twice daily for up to 14 days 15 g 6   • SUMAtriptan (IMITREX) 50 MG Tab Take 1 Tab by mouth Once PRN for Migraine. 10 Tab 3     No current facility-administered medications for this visit.       Review Of Systems:    Constitutional - Positive for fatigue  Psychiatric - Positive for infrequent depression, anxiety     Physical Examination:  Vital signs: Ht 1.6 m (5' 3\")   Wt 91.2 kg (201 lb)   Breastfeeding No   BMI 35.61 kg/m²     Musculoskeletal: No abnormal movements.     Mental Status Evaluation:   General: Young female, dressed in casual attire, good grooming and hygiene, in no apparent distress, calm and cooperative, good eye contact, no psychomotor agitation or retardation  Orientation: Alert and oriented to person, place and time  Recent and remote memory: Grossly intact  Attention span and concentration: Grossly intact  Speech: Spontaneous, normal rate, rhythm and tone  Thought Process: Linear, logical and goal directed  Thought Content: Denies suicidal or homicidal ideations, intent or plan  Perception: Denies auditory or visual hallucinations. No delusions noted  Associations: Intact  Language: Appropriate  Fund of knowledge and vocabulary: Grossly adequate  Mood: \"really good\"  Affect: Euthymic, mood congruent  Insight: Good  Judgment: Good    Depression screening:  Depression Screen (PHQ-2/PHQ-9) 5/22/2019 7/17/2019 2/16/2021   PHQ-2 Total Score - - 0   PHQ-2 Total Score 0 2 -   PHQ-9 Total Score - - 6   PHQ-9 Total Score - 7 -     Interpretation of PHQ-9 Total Score   Score Severity   1-4 No Depression   5-9 Mild Depression   10-14 Moderate Depression   15-19 Moderately Severe Depression   20-27 Severe Depression    Medical Records/Labs/Diagnostic Tests Reviewed:  [facility] " records - one prescription of Ativan in the last 2 years, no abuse suspected       Impression:  1.  Major depressive disorder, recurrent, in partial remission  2.  Generalized anxiety disorder     Plan:  1.  Continue Wellbutrin  mg and Effexor  mg daily for anxiety and depression  2.  Continue Ativan 1 mg daily as needed for anxiety.  Not refilled today.    Return to clinic in 3 months or sooner if symptoms worsen    The proposed treatment plan was discussed with the patient who was provided the opportunity to ask questions and make suggestions regarding alternative treatment. Patient verbalized understanding and expressed agreement with the plan.     Radha Bunrs M.D.  02/16/21    This note was created using voice recognition software (Dragon). The accuracy of the dictation is limited by the abilities of the software. I have reviewed the note prior to signing, however some errors in grammar and context are still possible. If you have any questions related to this note please do not hesitate to contact our office.

## 2021-02-23 RX ORDER — BUPROPION HYDROCHLORIDE 150 MG/1
150 TABLET ORAL EVERY MORNING
Qty: 90 TABLET | Refills: 0 | Status: SHIPPED
Start: 2021-02-23 | End: 2021-12-10

## 2021-02-23 RX ORDER — VENLAFAXINE HYDROCHLORIDE 75 MG/1
225 CAPSULE, EXTENDED RELEASE ORAL DAILY
Qty: 270 CAPSULE | Refills: 0 | Status: SHIPPED | OUTPATIENT
Start: 2021-02-23 | End: 2021-02-23

## 2021-02-23 RX ORDER — VENLAFAXINE HYDROCHLORIDE 225 MG/1
225 TABLET, EXTENDED RELEASE ORAL DAILY
Qty: 90 TABLET | Refills: 0 | Status: SHIPPED | OUTPATIENT
Start: 2021-02-23 | End: 2021-07-06

## 2021-04-02 ENCOUNTER — TELEMEDICINE (OUTPATIENT)
Dept: BEHAVIORAL HEALTH | Facility: CLINIC | Age: 36
End: 2021-04-02
Payer: COMMERCIAL

## 2021-04-02 DIAGNOSIS — F33.41 MDD (MAJOR DEPRESSIVE DISORDER), RECURRENT, IN PARTIAL REMISSION (HCC): ICD-10-CM

## 2021-04-02 DIAGNOSIS — F41.1 GENERALIZED ANXIETY DISORDER: ICD-10-CM

## 2021-04-02 PROCEDURE — 90791 PSYCH DIAGNOSTIC EVALUATION: CPT | Performed by: MARRIAGE & FAMILY THERAPIST
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
## 2021-05-19 ENCOUNTER — TELEMEDICINE (OUTPATIENT)
Dept: BEHAVIORAL HEALTH | Facility: CLINIC | Age: 36
End: 2021-05-19
Payer: COMMERCIAL

## 2021-05-19 DIAGNOSIS — F41.1 GENERALIZED ANXIETY DISORDER: ICD-10-CM

## 2021-05-19 PROCEDURE — 90834 PSYTX W PT 45 MINUTES: CPT | Mod: 95 | Performed by: MARRIAGE & FAMILY THERAPIST

## 2021-05-19 NOTE — PSYCHOTHERAPY
Patient reports that she is doing better this week. Work has changed.   Negative feelings that at any time she could be replaced. Work is mentally draining. Loves the kids and coworkers.   Procrastinate a lot  Self care - self destructive.   Has plans to do so many things   Patient is making changes in understanding mental health.   Patient feels that she has had depression her whole life.   Feels that
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
## 2021-06-21 ENCOUNTER — APPOINTMENT (OUTPATIENT)
Dept: BEHAVIORAL HEALTH | Facility: CLINIC | Age: 36
End: 2021-06-21
Payer: COMMERCIAL

## 2021-06-23 ENCOUNTER — OFFICE VISIT (OUTPATIENT)
Dept: BEHAVIORAL HEALTH | Facility: CLINIC | Age: 36
End: 2021-06-23
Payer: COMMERCIAL

## 2021-06-23 DIAGNOSIS — F41.1 GENERALIZED ANXIETY DISORDER: ICD-10-CM

## 2021-06-23 DIAGNOSIS — F90.0 ATTENTION DEFICIT HYPERACTIVITY DISORDER (ADHD), INATTENTIVE TYPE, MILD: ICD-10-CM

## 2021-06-23 DIAGNOSIS — F33.41 MDD (MAJOR DEPRESSIVE DISORDER), RECURRENT, IN PARTIAL REMISSION (HCC): ICD-10-CM

## 2021-06-23 PROCEDURE — 96136 PSYCL/NRPSYC TST PHY/QHP 1ST: CPT | Performed by: PSYCHOLOGIST

## 2021-06-23 PROCEDURE — 90791 PSYCH DIAGNOSTIC EVALUATION: CPT | Performed by: PSYCHOLOGIST

## 2021-06-23 PROCEDURE — 96137 PSYCL/NRPSYC TST PHY/QHP EA: CPT | Performed by: PSYCHOLOGIST
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
## 2021-07-01 PROBLEM — F90.0 ATTENTION DEFICIT HYPERACTIVITY DISORDER (ADHD), INATTENTIVE TYPE, MILD: Status: ACTIVE | Noted: 2021-07-01

## 2021-07-01 PROCEDURE — 96130 PSYCL TST EVAL PHYS/QHP 1ST: CPT | Performed by: PSYCHOLOGIST

## 2021-07-07 DIAGNOSIS — F41.1 GENERALIZED ANXIETY DISORDER: ICD-10-CM

## 2021-07-07 RX ORDER — LORAZEPAM 1 MG/1
1 TABLET ORAL
Qty: 15 TABLET | Refills: 0 | Status: SHIPPED | OUTPATIENT
Start: 2021-07-07 | End: 2021-08-06

## 2021-07-07 NOTE — TELEPHONE ENCOUNTER
Received request via: Patient    Was the patient seen in the last year in this department? Yes    Does the patient have an active prescription (recently filled or refills available) for medication(s) requested? No    Requested Prescriptions     Pending Prescriptions Disp Refills   • LORazepam (ATIVAN) 1 MG Tab 15 tablet 0     Sig: Take 1 tablet by mouth 1 time a day as needed for Anxiety for up to 30 days.         Pt called and stated she needs a refill her lorazepam sent to [facility].

## 2021-07-16 ENCOUNTER — OFFICE VISIT (OUTPATIENT)
Dept: URGENT CARE | Facility: CLINIC | Age: 36
End: 2021-07-16

## 2021-07-16 VITALS
HEIGHT: 63 IN | SYSTOLIC BLOOD PRESSURE: 124 MMHG | RESPIRATION RATE: 16 BRPM | HEART RATE: 94 BPM | OXYGEN SATURATION: 100 % | WEIGHT: 195.4 LBS | DIASTOLIC BLOOD PRESSURE: 78 MMHG | TEMPERATURE: 97.4 F | BODY MASS INDEX: 34.62 KG/M2

## 2021-07-16 DIAGNOSIS — J02.0 PHARYNGITIS DUE TO STREPTOCOCCUS SPECIES: ICD-10-CM

## 2021-07-16 LAB
INT CON NEG: NORMAL
INT CON POS: NORMAL
S PYO AG THROAT QL: POSITIVE

## 2021-07-16 PROCEDURE — 87880 STREP A ASSAY W/OPTIC: CPT | Performed by: NURSE PRACTITIONER

## 2021-07-16 PROCEDURE — 99214 OFFICE O/P EST MOD 30 MIN: CPT | Performed by: NURSE PRACTITIONER

## 2021-07-16 RX ORDER — AMOXICILLIN 500 MG/1
500 CAPSULE ORAL 2 TIMES DAILY
Qty: 20 CAPSULE | Refills: 0 | Status: SHIPPED | OUTPATIENT
Start: 2021-07-16 | End: 2021-07-26

## 2021-07-16 ASSESSMENT — ENCOUNTER SYMPTOMS
EYE REDNESS: 0
NAUSEA: 0
HEADACHES: 0
VOMITING: 0
SHORTNESS OF BREATH: 0
TROUBLE SWALLOWING: 1
DIZZINESS: 0
COUGH: 0
DIARRHEA: 0
SWOLLEN GLANDS: 1
MYALGIAS: 0
SORE THROAT: 1
CHILLS: 1
FEVER: 0

## 2021-07-16 ASSESSMENT — FIBROSIS 4 INDEX: FIB4 SCORE: 0.37

## 2021-07-17 NOTE — PROGRESS NOTES
Subjective:   Naomi Javed is a 35 y.o. female who presents for Sore Throat (throat hurts (R) x started today )      Pharyngitis   This is a new problem. The current episode started yesterday. The problem has been unchanged. Neither side of throat is experiencing more pain than the other. There has been no fever. The pain is at a severity of 10/10. The pain is severe. Associated symptoms include swollen glands and trouble swallowing. Pertinent negatives include no congestion, coughing, diarrhea, ear discharge, ear pain, headaches, plugged ear sensation, shortness of breath or vomiting. She has had exposure to strep. She has had no exposure to mono. She has tried acetaminophen for the symptoms. The treatment provided no relief.       Review of Systems   Constitutional: Positive for chills and malaise/fatigue. Negative for fever.   HENT: Positive for sore throat and trouble swallowing. Negative for congestion, ear discharge and ear pain.    Eyes: Negative for redness.   Respiratory: Negative for cough and shortness of breath.    Cardiovascular: Negative for chest pain.   Gastrointestinal: Negative for diarrhea, nausea and vomiting.   Genitourinary: Negative for dysuria.   Musculoskeletal: Negative for myalgias.   Skin: Negative for rash.   Neurological: Negative for dizziness and headaches.       Medications:    • amoxicillin Caps  • buPROPion  • LORazepam Tabs  • metFORMIN Tabs  • SUMAtriptan Tabs  • triamcinolone acetonide Crea  • venlafaxine ER Tb24    Allergies: Patient has no known allergies.    Problem List: Naomi Javed does not have any pertinent problems on file.    Surgical History:  Past Surgical History:   Procedure Laterality Date   • OTHER      back surgery       Past Social Hx: Naomi Javed  reports that she has never smoked. She has never used smokeless tobacco. She reports current alcohol use. She reports that she does not use drugs.     Past Family Hx:  Naomi  "Malia Javed family history includes Alcohol abuse in her brother and father; Anxiety disorder in her brother and mother; Cancer in her mother, paternal aunt, and paternal grandmother; Depression in her brother and mother; Diabetes in her mother; Heart Disease in her maternal grandmother; Schizophrenia in her brother.     Problem list, medications, and allergies reviewed by myself today in Epic.     Objective:     /78   Pulse 94   Temp 36.3 °C (97.4 °F) (Temporal)   Resp 16   Ht 1.6 m (5' 3\")   Wt 88.6 kg (195 lb 6.4 oz)   SpO2 100%   BMI 34.61 kg/m²     Physical Exam  Vitals and nursing note reviewed.   Constitutional:       General: She is not in acute distress.     Appearance: She is well-developed.   HENT:      Head: Normocephalic and atraumatic.      Right Ear: Tympanic membrane and external ear normal.      Left Ear: Tympanic membrane and external ear normal.      Nose: Nose normal.      Right Sinus: No maxillary sinus tenderness or frontal sinus tenderness.      Left Sinus: No maxillary sinus tenderness or frontal sinus tenderness.      Mouth/Throat:      Mouth: Mucous membranes are moist.      Pharynx: Uvula midline. Posterior oropharyngeal erythema present.      Tonsils: No tonsillar exudate or tonsillar abscesses. 1+ on the right. 1+ on the left.   Eyes:      General:         Right eye: No discharge.         Left eye: No discharge.      Conjunctiva/sclera: Conjunctivae normal.   Cardiovascular:      Rate and Rhythm: Normal rate.   Pulmonary:      Effort: Pulmonary effort is normal. No respiratory distress.      Breath sounds: Normal breath sounds.   Abdominal:      General: There is no distension.   Musculoskeletal:         General: Normal range of motion.   Lymphadenopathy:      Cervical: Cervical adenopathy present.   Skin:     General: Skin is warm and dry.   Neurological:      General: No focal deficit present.      Mental Status: She is alert and oriented to person, place, and time. " Mental status is at baseline.      Gait: Gait (gait at baseline) normal.   Psychiatric:         Judgment: Judgment normal.         Assessment/Plan:     Diagnosis and associated orders:     1. Pharyngitis due to Streptococcus species  POCT Rapid Strep A    amoxicillin (AMOXIL) 500 MG Cap      Comments/MDM:     Patient is a 35-year-old female present with stated above.  Patient having acute illness with systemic symptoms POSITIVE Strep A.  Discussed treatment of amoxicillin for 10 days, salt water gargles, soft foods, cool liquids, ibuprofen and Tylenol for any pain or fevers.   Return to the urgent care if symptoms are not improving or any worsening symptoms or concerns. Present to the emergency room or call 911 if any severe swelling of the throat, difficulty swallowing, difficulty breathing, wheezing, or any other severe concerns.         •   •              Please note that this dictation was created using voice recognition software. I have made a reasonable attempt to correct obvious errors, but I expect that there are errors of grammar and possibly content that I did not discover before finalizing the note.    This note was electronically signed by Isai PADILLA.

## 2021-08-02 ENCOUNTER — OFFICE VISIT (OUTPATIENT)
Dept: MEDICAL GROUP | Facility: PHYSICIAN GROUP | Age: 36
End: 2021-08-02

## 2021-08-02 VITALS
DIASTOLIC BLOOD PRESSURE: 60 MMHG | HEART RATE: 68 BPM | OXYGEN SATURATION: 98 % | SYSTOLIC BLOOD PRESSURE: 110 MMHG | WEIGHT: 195.2 LBS | RESPIRATION RATE: 15 BRPM | BODY MASS INDEX: 34.59 KG/M2 | HEIGHT: 63 IN | TEMPERATURE: 98.2 F

## 2021-08-02 DIAGNOSIS — E28.2 PCOS (POLYCYSTIC OVARIAN SYNDROME): ICD-10-CM

## 2021-08-02 DIAGNOSIS — J02.0 PHARYNGITIS DUE TO STREPTOCOCCUS SPECIES: ICD-10-CM

## 2021-08-02 DIAGNOSIS — G89.29 CHRONIC LOW BACK PAIN, UNSPECIFIED BACK PAIN LATERALITY, UNSPECIFIED WHETHER SCIATICA PRESENT: ICD-10-CM

## 2021-08-02 DIAGNOSIS — M54.50 CHRONIC LOW BACK PAIN, UNSPECIFIED BACK PAIN LATERALITY, UNSPECIFIED WHETHER SCIATICA PRESENT: ICD-10-CM

## 2021-08-02 PROCEDURE — 99214 OFFICE O/P EST MOD 30 MIN: CPT | Performed by: FAMILY MEDICINE

## 2021-08-02 ASSESSMENT — FIBROSIS 4 INDEX: FIB4 SCORE: 0.37

## 2021-08-03 NOTE — ASSESSMENT & PLAN NOTE
Chronic issue  Pending GYN establishment but GYN office closed.   Requesting a new referral.   Started metformin 850mg daily

## 2021-08-03 NOTE — ASSESSMENT & PLAN NOTE
Chronic issue  Longstanding hx of low back pain and would like to be referred to pain management  Has tried chiropractor and that didn't help  She would like to stay away from narcotics.

## 2021-08-03 NOTE — PROGRESS NOTES
Subjective:     Chief Complaint   Patient presents with   • Referral Needed   • Pharyngitis     had strep throat        HPI:   Naomi presents today to discuss the following.    Pharyngitis due to Streptococcus species  She went to urgent care 7/16 for sore throat and tested positive for strep.  She is on amoxicillin for this.    PCOS (polycystic ovarian syndrome)  Chronic issue  Pending GYN establishment but GYN office closed.   Requesting a new referral.   Started metformin 850mg daily     Chronic low back pain  Chronic issue  Longstanding hx of low back pain and would like to be referred to pain management  Has tried chiropractor and that didn't help  She would like to stay away from narcotics.       Past Medical History:   Diagnosis Date   • Anxiety    • Depression    • PCOS (polycystic ovarian syndrome)        Current Outpatient Medications Ordered in Epic   Medication Sig Dispense Refill   • LORazepam (ATIVAN) 1 MG Tab Take 1 tablet by mouth 1 time a day as needed for Anxiety for up to 30 days. 15 tablet 0   • venlafaxine ER (EFFEXOR) 225 MG TABLET SR 24 HR tablet Take 1 tablet by mouth every day. 30 tablet 0   • buPROPion (WELLBUTRIN XL) 150 MG XL tablet Take 1 tablet by mouth every morning. 90 tablet 0   • metFORMIN (GLUCOPHAGE) 850 MG Tab TAKE 1 TAB BY MOUTH 2 TIMES A DAY, WITH MEALS FOR 90 DAYS.     • triamcinolone acetonide (KENALOG) 0.5 % Cream Apply to affected areas twice daily for up to 14 days 15 g 6   • SUMAtriptan (IMITREX) 50 MG Tab Take 1 Tab by mouth Once PRN for Migraine. 10 Tab 3     No current Epic-ordered facility-administered medications on file.       Allergies:  Patient has no known allergies.    Health Maintenance: Completed    ROS:  Gen: no fevers/chills, no changes in weight  Eyes: no changes in vision  Pulm: no sob, no cough  CV: no chest pain, no palpitations  GI: no nausea/vomiting, no diarrhea      Objective:     Exam:  /60 (BP Location: Left arm, Patient Position: Sitting,  "BP Cuff Size: Adult)   Pulse 68   Temp 36.8 °C (98.2 °F) (Temporal)   Resp 15   Ht 1.6 m (5' 3\")   Wt 88.5 kg (195 lb 3.2 oz)   SpO2 98%   BMI 34.58 kg/m²  Body mass index is 34.58 kg/m².      Constitutional: Alert, no distress, well-groomed.  Skin: Warm, dry, good turgor, no rashes in visible areas.  Eye: Equal, round and reactive, conjunctiva clear, lids normal.  ENMT: Lips without lesions, good dentition, moist mucous membranes.  Neck: Trachea midline, no masses, no thyromegaly.  Respiratory: Unlabored respiratory effort, no cough.  MSK: Normal gait, moves all extremities.  Neuro: Grossly non-focal.   Psych: Alert and oriented x3, normal affect and mood.        Assessment & Plan:     35 y.o. female with the following -     1. Pharyngitis due to Streptococcus species  New problem.  Diagnosed with strep pharyngitis and completed a 10-day course of amoxicillin.  Has some residual sore throat.  I recommend hot tea with honey and if symptoms worsen to reported to me.    2. PCOS (polycystic ovarian syndrome)  Chronic, stable condition.  Has PCOS on Metformin 850 mg daily.  I will refer to gynecology for further evaluation again.  - REFERRAL TO GYNECOLOGY    3. Chronic low back pain, unspecified back pain laterality, unspecified whether sciatica present  Chronic, unchanged condition.  Has longstanding history of chronic low back pain.  She would like to stay away from narcotics.  She would like to see a spine specialist for further evaluation.  - REFERRAL TO SPINE SURGERY      Return in about 6 months (around 2/2/2022).    Please note that this dictation was created using voice recognition software. I have made every reasonable attempt to correct obvious errors, but I expect that there are errors of grammar and possibly content that I did not discover before finalizing the note.      "

## 2021-08-03 NOTE — ASSESSMENT & PLAN NOTE
She went to urgent care 7/16 for sore throat and tested positive for strep.  She is on amoxicillin for this.

## 2021-09-16 ENCOUNTER — OFFICE VISIT (OUTPATIENT)
Dept: BEHAVIORAL HEALTH | Facility: CLINIC | Age: 36
End: 2021-09-16

## 2021-09-16 DIAGNOSIS — F41.1 GENERALIZED ANXIETY DISORDER: ICD-10-CM

## 2021-09-16 DIAGNOSIS — F33.41 MDD (MAJOR DEPRESSIVE DISORDER), RECURRENT, IN PARTIAL REMISSION (HCC): ICD-10-CM

## 2021-09-16 DIAGNOSIS — F90.0 ATTENTION DEFICIT HYPERACTIVITY DISORDER (ADHD), INATTENTIVE TYPE, MILD: ICD-10-CM

## 2021-09-16 PROCEDURE — 90791 PSYCH DIAGNOSTIC EVALUATION: CPT | Performed by: SOCIAL WORKER
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
## 2021-12-09 ENCOUNTER — OFFICE VISIT (OUTPATIENT)
Dept: URGENT CARE | Facility: CLINIC | Age: 36
End: 2021-12-09

## 2021-12-09 VITALS
BODY MASS INDEX: 34.45 KG/M2 | SYSTOLIC BLOOD PRESSURE: 120 MMHG | OXYGEN SATURATION: 98 % | DIASTOLIC BLOOD PRESSURE: 80 MMHG | TEMPERATURE: 96.9 F | WEIGHT: 194.4 LBS | RESPIRATION RATE: 16 BRPM | HEART RATE: 114 BPM | HEIGHT: 63 IN

## 2021-12-09 DIAGNOSIS — J10.1 INFLUENZA A: ICD-10-CM

## 2021-12-09 DIAGNOSIS — R50.9 FEVER IN ADULT: ICD-10-CM

## 2021-12-09 LAB
EXTERNAL QUALITY CONTROL: NORMAL
FLUAV+FLUBV AG SPEC QL IA: POSITIVE
INT CON NEG: NEGATIVE
INT CON POS: POSITIVE
SARS-COV+SARS-COV-2 AG RESP QL IA.RAPID: NEGATIVE

## 2021-12-09 PROCEDURE — 87426 SARSCOV CORONAVIRUS AG IA: CPT | Performed by: NURSE PRACTITIONER

## 2021-12-09 PROCEDURE — 87804 INFLUENZA ASSAY W/OPTIC: CPT | Performed by: NURSE PRACTITIONER

## 2021-12-09 PROCEDURE — 99214 OFFICE O/P EST MOD 30 MIN: CPT | Performed by: NURSE PRACTITIONER

## 2021-12-09 RX ORDER — OSELTAMIVIR PHOSPHATE 75 MG/1
75 CAPSULE ORAL 2 TIMES DAILY
Qty: 10 CAPSULE | Refills: 0 | Status: SHIPPED
Start: 2021-12-09 | End: 2022-03-01

## 2021-12-09 ASSESSMENT — FIBROSIS 4 INDEX: FIB4 SCORE: 0.38

## 2021-12-09 NOTE — PROGRESS NOTES
Chief Complaint   Patient presents with   • Fever     chills, light headed, headache, congested, green mucus, x1 day        HISTORY OF PRESENT ILLNESS: Patient is a pleasant 36 y.o. female who presents today due to symptoms which started yesterday with sudden onset. Pt reports rhinitis, headache, ear pressure, fever, chills, fatigue, malaise, and body aches. Denies cough, GI symptoms, chest pain, shortness of breath, or wheezing. Denies h/o asthma/copd/CAP. Has tried OTC cold medications without significant relief of symptoms. No recent ABX use. No other aggravating or alleviating factors. Reports no known exposure to COVID-19.  He has been vaccinated for Covid, has not been vaccinated for influenza.  She does work in a .      Patient Active Problem List    Diagnosis Date Noted   • Pharyngitis due to Streptococcus species 08/02/2021   • Attention deficit hyperactivity disorder (ADHD), inattentive type, mild 07/01/2021   • Prediabetes 11/02/2020   • Concentration deficit 10/02/2020   • BRCA gene mutation negative 05/22/2019   • Family history of systemic lupus erythematosus 04/15/2019   • MDD (major depressive disorder), recurrent, in partial remission (HCC) 04/15/2019   • Family history of ovarian cancer 04/15/2019   • Family history of breast cancer 04/15/2019   • Nonintractable migraine 04/15/2019   • Positive TB test 04/15/2019   • Obesity (BMI 30-39.9) 06/19/2018   • SANDI (generalized anxiety disorder) 02/07/2018   • PCOS (polycystic ovarian syndrome) 02/07/2018   • Eczema 02/07/2018   • Chronic low back pain 02/07/2018       Allergies:Patient has no known allergies.    Current Outpatient Medications Ordered in Epic   Medication Sig Dispense Refill   • venlafaxine ER (EFFEXOR) 225 MG TABLET SR 24 HR tablet Take 1 tablet by mouth every day. 30 tablet 0   • metFORMIN (GLUCOPHAGE) 850 MG Tab TAKE 1 TAB BY MOUTH 2 TIMES A DAY, WITH MEALS FOR 90 DAYS.     • buPROPion (WELLBUTRIN XL) 150 MG XL tablet Take 1  tablet by mouth every morning. (Patient not taking: Reported on 12/9/2021) 90 tablet 0   • triamcinolone acetonide (KENALOG) 0.5 % Cream Apply to affected areas twice daily for up to 14 days 15 g 6     No current Epic-ordered facility-administered medications on file.       Past Medical History:   Diagnosis Date   • Anxiety    • Depression    • PCOS (polycystic ovarian syndrome)        Social History     Tobacco Use   • Smoking status: Never Smoker   • Smokeless tobacco: Never Used   Vaping Use   • Vaping Use: Never used   Substance Use Topics   • Alcohol use: Yes     Comment: rarely, one drink every 2 months   • Drug use: No       Family Status   Relation Name Status   • Mo  (Not Specified)   • PAunt  (Not Specified)   • MGMo  (Not Specified)   • PGMo  (Not Specified)   • Fa  (Not Specified)   • Bro  (Not Specified)     Family History   Problem Relation Age of Onset   • Diabetes Mother    • Cancer Mother         ovarian   • Anxiety disorder Mother    • Depression Mother    • Cancer Paternal Aunt         breast   • Heart Disease Maternal Grandmother    • Cancer Paternal Grandmother         pancreatic   • Alcohol abuse Father    • Alcohol abuse Brother    • Schizophrenia Brother    • Anxiety disorder Brother    • Depression Brother        ROS:  Review of Systems   Constitutional: Positive for subjective fever, chills, fatigue, malaise. Negative for weight loss.  HENT: Positive for rhinitis, ear pressure. Negative for sore throat, nosebleeds, and neck pain.    Eyes: Negative for vision changes.   Cardiovascular: Negative for chest pain, palpitations, orthopnea and leg swelling.   Respiratory: Negative for cough, shortness of breath and wheezing.   Gastrointestinal: Negative for abdominal pain, vomiting or diarrhea.   Skin: Negative for rash, diaphoresis.     Exam:  /80 (BP Location: Right arm, Patient Position: Sitting, BP Cuff Size: Adult long)   Pulse (!) 114   Temp 36.1 °C (96.9 °F) (Temporal)   Resp 16    "Ht 1.6 m (5' 3\")   Wt 88.2 kg (194 lb 6.4 oz)   SpO2 98%   General: well-nourished, well-developed female in NAD  Head: normocephalic, atraumatic  Eyes: PERRLA, EOM within normal limits, no conjunctival injection, no scleral icterus, visual fields and acuity grossly intact.  Ears: normal shape and symmetry, no tenderness, no discharge. External canals are without any significant edema or erythema. Tympanic membranes are without any inflammation, no effusion. Gross auditory acuity is intact.  Nose: symmetrical without tenderness, mild discharge, erythema present bilateral nares.  Mouth/Throat: reasonable hygiene, no exudates or tonsillar enlargement. Mild erythema present.   Neck: no masses, range of motion within normal limits, no tracheal deviation.  Lymph: mild cervical adenopathy. No supraclavicular adenopathy.   Neuro: alert and oriented. Cranial nerves 1-12 grossly intact.   Cardiovascular: regular rate auscultated 95, regular rhythm without murmurs, rubs, or gallops. No edema.   Pulmonary: no distress. Chest is symmetrical with respiration. No wheezes, crackles, or rhonchi.   Musculoskeletal: appropriate muscle tone, gait is stable.  Skin: warm, dry, intact, no clubbing, no cyanosis.   Psych: appropriate mood, affect, judgement.       POC flu positive for flu A    POC SARS negative      Assessment/Plan:  1. Influenza A  oseltamivir (TAMIFLU) 75 MG Cap   2. Fever in adult  POCT SARS-COV Antigen ARIA (Symptomatic Only)    POCT Influenza A/B             Patient presents with influenza A.  Tamiflu as directed.  Rapid Covid negative.  Discussed natural progression and sx care.  Rest, increase fluids, hand and respiratory hygiene. May take OTC medications as directed for symptom relief. STRICT ER precautions.   Supportive care, differential diagnoses, and indications for immediate follow-up discussed with patient.   Pathogenesis of diagnosis discussed including typical length and natural progression.  Instructed to " return to clinic or nearest emergency department for any change in condition, further concerns, or worsening of symptoms.  Patient states understanding of the plan of care and discharge instructions.          TIARA López.

## 2021-12-09 NOTE — LETTER
December 9, 2021         Patient: Naomi Peters   YOB: 1985   Date of Visit: 12/9/2021           To Whom it May Concern:    Naomi Peters was seen in my clinic on 12/9/2021. She has been diagnosed with influenza.  She may be excused from work this week until she is fever free for at least 24 hours.    If you have any questions or concerns, please don't hesitate to call.        Sincerely,           TIARA López.  Electronically Signed

## 2021-12-10 ENCOUNTER — OFFICE VISIT (OUTPATIENT)
Dept: MEDICAL GROUP | Facility: MEDICAL CENTER | Age: 36
End: 2021-12-10

## 2021-12-10 VITALS
SYSTOLIC BLOOD PRESSURE: 116 MMHG | WEIGHT: 197 LBS | RESPIRATION RATE: 16 BRPM | DIASTOLIC BLOOD PRESSURE: 70 MMHG | BODY MASS INDEX: 34.91 KG/M2 | TEMPERATURE: 97 F | HEIGHT: 63 IN | OXYGEN SATURATION: 97 % | HEART RATE: 73 BPM

## 2021-12-10 DIAGNOSIS — E28.2 PCOS (POLYCYSTIC OVARIAN SYNDROME): ICD-10-CM

## 2021-12-10 DIAGNOSIS — F33.41 MDD (MAJOR DEPRESSIVE DISORDER), RECURRENT, IN PARTIAL REMISSION (HCC): ICD-10-CM

## 2021-12-10 DIAGNOSIS — R73.03 PREDIABETES: ICD-10-CM

## 2021-12-10 PROCEDURE — 99213 OFFICE O/P EST LOW 20 MIN: CPT | Performed by: STUDENT IN AN ORGANIZED HEALTH CARE EDUCATION/TRAINING PROGRAM

## 2021-12-10 RX ORDER — METFORMIN HYDROCHLORIDE 750 MG/1
750 TABLET, EXTENDED RELEASE ORAL DAILY
Qty: 30 TABLET | Refills: 11 | Status: SHIPPED
Start: 2021-12-10 | End: 2022-03-01

## 2021-12-10 RX ORDER — BREXPIPRAZOLE 1 MG/1
TABLET ORAL
COMMUNITY
End: 2023-04-27

## 2021-12-10 ASSESSMENT — FIBROSIS 4 INDEX: FIB4 SCORE: 0.38

## 2021-12-10 NOTE — PROGRESS NOTES
"Subjective:     CC: Establish care, influenza, PCOS    HPI:   Naomi presents today to establish care.  Is a previous patient of Dr. Mukesh Martin.     Influenza  Patient was seen in urgent care yesterday for influenza.  Patient presented for fever, chills, lightheadedness and mucus and tested positive for influenza A.    PCOS  Patient previously placed on Metformin for this but has not started taking it.  Patient was placed on Metformin 850 mg twice daily.  Patient states that she was afraid to start Metformin due to researching side effects.  Discussed side effects with patient today and patient agreeable to starting on 750 mg sustained-release for symptoms.  Patient has been referred to GYN but needs new referral.  New referral placed today.  Patient has Nexplanon in her arm but it has been  for over a year.  Patient is interested in controlling PCOS with hormones.    Back pain  Patient seen by Dr. Rodriguez with pain management.  Patient with previous spine decompression surgery in . Trigger point injrection    Behavioral health  Patient following with behavioral health for generalized anxiety, ADHD and depression.      ROS:  Gen: no fevers/chills  Pulm: no sob, no cough  CV: no chest pain, no palpitations  GI: no nausea/vomiting, no diarrhea  Neuro: no headaches      Objective:     Exam:  /70 (BP Location: Right arm, Patient Position: Sitting, BP Cuff Size: Adult)   Pulse 73   Temp 36.1 °C (97 °F) (Temporal)   Resp 16   Ht 1.6 m (5' 3\")   Wt 89.4 kg (197 lb)   SpO2 97%   BMI 34.90 kg/m²  Body mass index is 34.9 kg/m².    Gen: Alert and oriented, No apparent distress.  Neck: Neck is supple without lymphadenopathy.  Lungs: Normal effort, CTA bilaterally, no wheezes, rhonchi, or rales  CV: Regular rate and rhythm. No murmurs, rubs, or gallops.  Ext: No clubbing, cyanosis, edema.    Assessment & Plan:     36 y.o. female with the following -     1. PCOS (polycystic ovarian " syndrome)  Chronic, stable.  Patient has not been taking Metformin.  We will switch Metformin to 750 mg extended release.  Patient referred back to GYN.  Patient has had difficulty getting up by GYN due to office closures and wait times.  Patient needs Nexplanon removed.  - Referral to Gynecology  - metFORMIN ER (GLUCOPHAGE XR) 750 MG TABLET SR 24 HR; Take 1 Tablet by mouth every day.  Dispense: 30 Tablet; Refill: 11    2. Prediabetes  Patient's last A1c of 5.4%.  Patient with previously elevated blood sugars.  We will continue to evaluate.  - Comp Metabolic Panel; Future  - HEMOGLOBIN A1C; Future  - Lipid Profile; Future  - MICROALBUMIN CREAT RATIO URINE; Future    3. MDD (major depressive disorder), recurrent, in partial remission (HCC)  Chronic, stable.  Patient continues to follow with behavioral health.    Return in about 3 months (around 3/10/2022).    Please note that this dictation was created using voice recognition software. I have made every reasonable attempt to correct obvious errors, but I expect that there are errors of grammar and possibly content that I did not discover before finalizing the note.

## 2021-12-14 ENCOUNTER — OFFICE VISIT (OUTPATIENT)
Dept: URGENT CARE | Facility: CLINIC | Age: 36
End: 2021-12-14

## 2021-12-14 VITALS
BODY MASS INDEX: 34.38 KG/M2 | HEART RATE: 102 BPM | TEMPERATURE: 97.4 F | OXYGEN SATURATION: 97 % | WEIGHT: 194 LBS | DIASTOLIC BLOOD PRESSURE: 80 MMHG | RESPIRATION RATE: 16 BRPM | SYSTOLIC BLOOD PRESSURE: 140 MMHG | HEIGHT: 63 IN

## 2021-12-14 DIAGNOSIS — R04.0 EPISTAXIS: ICD-10-CM

## 2021-12-14 PROCEDURE — 30901 CONTROL OF NOSEBLEED: CPT | Performed by: PHYSICIAN ASSISTANT

## 2021-12-14 RX ORDER — AMOXICILLIN AND CLAVULANATE POTASSIUM 875; 125 MG/1; MG/1
1 TABLET, FILM COATED ORAL 2 TIMES DAILY
Qty: 6 TABLET | Refills: 0 | Status: SHIPPED | OUTPATIENT
Start: 2021-12-14 | End: 2021-12-17

## 2021-12-14 ASSESSMENT — ENCOUNTER SYMPTOMS
ABDOMINAL PAIN: 0
CHILLS: 0
DIARRHEA: 0
COUGH: 0
HEADACHES: 0
FEVER: 0
WEAKNESS: 0
FOCAL WEAKNESS: 0
SENSORY CHANGE: 0
NAUSEA: 1
TINGLING: 0
LOSS OF CONSCIOUSNESS: 0
SORE THROAT: 0
VOMITING: 0
DIZZINESS: 0

## 2021-12-14 ASSESSMENT — FIBROSIS 4 INDEX: FIB4 SCORE: 0.38

## 2021-12-15 ENCOUNTER — TELEPHONE (OUTPATIENT)
Dept: URGENT CARE | Facility: CLINIC | Age: 36
End: 2021-12-15

## 2021-12-15 NOTE — PROGRESS NOTES
Subjective     Colleen Peters is a 36 y.o. female who presents with Epistaxis (In class and the nose started bleeding incontrollably. Bleeding for a solid hour.)            Epistaxis   The bleeding has been from the right nare. This is a new problem. The current episode started today (1 hour ago). The problem occurs constantly. The problem has been unchanged. The bleeding is associated with dry air. She has tried pressure for the symptoms. The treatment provided no relief. Her past medical history is significant for colds (patient on Tamiflu. Recently tested positive for Influenza). There is no history of a bleeding disorder, frequent nosebleeds or sinus problems.       Past Medical History:   Diagnosis Date   • Anxiety    • Depression    • PCOS (polycystic ovarian syndrome)        Past Surgical History:   Procedure Laterality Date   • OTHER      back surgery       Family History   Problem Relation Age of Onset   • Diabetes Mother    • Cancer Mother         ovarian   • Anxiety disorder Mother    • Depression Mother    • Cancer Paternal Aunt         breast   • Heart Disease Maternal Grandmother    • Cancer Paternal Grandmother         pancreatic   • Alcohol abuse Father    • Alcohol abuse Brother    • Schizophrenia Brother    • Anxiety disorder Brother    • Depression Brother        No Known Allergies    Medications, Allergies, and current problem list reviewed today in Epic      Review of Systems   Constitutional: Negative for chills, fever and malaise/fatigue.   HENT: Positive for nosebleeds. Negative for sore throat.    Respiratory: Negative for cough.    Cardiovascular: Negative for chest pain and leg swelling.   Gastrointestinal: Positive for nausea. Negative for abdominal pain, diarrhea and vomiting.   Skin: Negative for rash.   Neurological: Negative for dizziness, tingling, sensory change, focal weakness, loss of consciousness, weakness and headaches.         All other systems reviewed and are negative.  "        Objective     /80 (BP Location: Right arm, Patient Position: Sitting, BP Cuff Size: Adult)   Pulse (!) 102   Temp 36.3 °C (97.4 °F) (Temporal)   Resp 16   Ht 1.6 m (5' 3\")   Wt 88 kg (194 lb)   SpO2 97%   BMI 34.37 kg/m²      Physical Exam  Constitutional:       General: She is not in acute distress.     Appearance: She is not ill-appearing.   HENT:      Head: Normocephalic and atraumatic.      Nose: No signs of injury.      Right Nostril: Epistaxis present.      Left Nostril: No epistaxis.      Mouth/Throat:      Comments: Blood draining in oropharynx   Eyes:      Conjunctiva/sclera: Conjunctivae normal.   Cardiovascular:      Rate and Rhythm: Regular rhythm. Tachycardia present.      Heart sounds: Normal heart sounds.   Pulmonary:      Effort: Pulmonary effort is normal. No respiratory distress.      Breath sounds: No stridor. No wheezing.   Skin:     General: Skin is warm and dry.   Neurological:      General: No focal deficit present.      Mental Status: She is alert and oriented to person, place, and time.   Psychiatric:         Mood and Affect: Mood normal.         Behavior: Behavior normal.         Thought Content: Thought content normal.         Judgment: Judgment normal.                             Assessment & Plan        1. Epistaxis  amoxicillin-clavulanate (AUGMENTIN) 875-125 MG Tab       Tried to achieve hemostasis with Franki synephrine and clamp. Hemostasis was not achieved. Rhino Rocket was applied to left nostril with good hemostasis. Advised patient to return in about 48 hours and to go to the ED if bleeding restarts for possible cauterization         Current Outpatient Medications:   •  amoxicillin-clavulanate (AUGMENTIN) 875-125 MG Tab, Take 1 Tablet by mouth 2 times a day for 3 days., Disp: 6 Tablet, Rfl: 0      Differential diagnoses, Supportive care, and indications for immediate follow-up discussed with patient.   Pathogenesis of diagnosis discussed including typical length " and natural progression.   Instructed to return to clinic or nearest emergency department for any change in condition, further concerns, or worsening of symptoms.    The patient demonstrated a good understanding and agreed with the treatment plan.    Alecia Driver P.A.-C.

## 2021-12-15 NOTE — TELEPHONE ENCOUNTER
Alecia,    I called to check in on your patient from last night.  I ended up leaving a message for her.

## 2022-03-01 ENCOUNTER — OFFICE VISIT (OUTPATIENT)
Dept: MEDICAL GROUP | Facility: MEDICAL CENTER | Age: 37
End: 2022-03-01
Payer: COMMERCIAL

## 2022-03-01 VITALS
SYSTOLIC BLOOD PRESSURE: 118 MMHG | RESPIRATION RATE: 16 BRPM | HEIGHT: 63 IN | HEART RATE: 96 BPM | DIASTOLIC BLOOD PRESSURE: 72 MMHG | TEMPERATURE: 97.2 F | BODY MASS INDEX: 36.14 KG/M2 | OXYGEN SATURATION: 98 % | WEIGHT: 204 LBS

## 2022-03-01 DIAGNOSIS — J02.9 ACUTE PHARYNGITIS, UNSPECIFIED ETIOLOGY: ICD-10-CM

## 2022-03-01 DIAGNOSIS — J06.9 UPPER RESPIRATORY TRACT INFECTION, UNSPECIFIED TYPE: ICD-10-CM

## 2022-03-01 DIAGNOSIS — H61.22 IMPACTED CERUMEN OF LEFT EAR: ICD-10-CM

## 2022-03-01 LAB
INT CON NEG: NEGATIVE
INT CON POS: POSITIVE
S PYO AG THROAT QL: NEGATIVE

## 2022-03-01 PROCEDURE — 87880 STREP A ASSAY W/OPTIC: CPT | Performed by: STUDENT IN AN ORGANIZED HEALTH CARE EDUCATION/TRAINING PROGRAM

## 2022-03-01 PROCEDURE — 99213 OFFICE O/P EST LOW 20 MIN: CPT | Performed by: STUDENT IN AN ORGANIZED HEALTH CARE EDUCATION/TRAINING PROGRAM

## 2022-03-01 ASSESSMENT — FIBROSIS 4 INDEX: FIB4 SCORE: 0.38

## 2022-03-01 NOTE — PROGRESS NOTES
"Subjective:     CC: Pharyngitis    HPI:   Naomi presents today with    Sore throat  Patient presents today with sore throat x5 days.  Patient notes that sore throat came on suddenly and caused stabbing/severe pain.  Patient notes that she does have a cough productive of green mucus and bilateral ear pain.  Patient note fever.  Denies body aches, nausea, vomiting, diarrhea.  Patient notes that she had 4 pills of Augmentin at home and took those.  Patient notes she felt some improvement.     ROS:  Gen: + fevers/chills  ENT: + sore throat  Pulm: no sob, + cough  CV: no chest pain, no palpitations  GI: no nausea/vomiting, no diarrhea  Neuro: no headaches      Objective:     Exam:  /72 (BP Location: Left arm, Patient Position: Sitting, BP Cuff Size: Adult)   Pulse 96   Temp 36.2 °C (97.2 °F) (Temporal)   Resp 16   Ht 1.6 m (5' 3\")   Wt 92.5 kg (204 lb)   SpO2 98%   BMI 36.14 kg/m²  Body mass index is 36.14 kg/m².    Gen: Alert and oriented, No apparent distress.  Neck: Neck is supple without lymphadenopathy.  Lungs: Normal effort, CTA bilaterally, no wheezes, rhonchi, or rales  CV: Regular rate and rhythm. No murmurs, rubs, or gallops.  Ext: No clubbing, cyanosis, edema.  Ear: Cerumen impaction left ear.  TMs pearly gray and translucent after removal of cerumen.  Erythematous tonsils, no significant swelling or discharge.  Assessment & Plan:     36 y.o. female with the following -     1. Upper respiratory tract infection, unspecified type  1. Discussed benign nature of viral upper respiratory infections.  Negative strep test in office.  2. OTC anti-pyretics and decongestants as needed. Supportive care advised.  3. Follow-up in office or urgent care for worsening symptoms, difficulty breathing, lack of expected recovery, or should new symptoms or problems arise.  - POCT Rapid Strep A    2. Impacted cerumen of left ear  - Ear Cerumen Removal  Procedure: Cerumen Removal  Risks and benefits of procedure " discussed with patient.  Cerumen removed with  lavage   Patient tolerated the procedure well  Pt educated about proper care of ear canal. Q-tip cleaning discouraged, use of debrox and warm water lavage discussed.  Post lavage curette performed by provider, Post procedure exam with clear canal and normal TM.        Return if symptoms worsen or fail to improve.

## 2022-05-09 ENCOUNTER — PATIENT MESSAGE (OUTPATIENT)
Dept: MEDICAL GROUP | Facility: MEDICAL CENTER | Age: 37
End: 2022-05-09
Payer: COMMERCIAL

## 2022-05-09 DIAGNOSIS — Z12.83 SKIN CANCER SCREENING: ICD-10-CM

## 2022-05-11 DIAGNOSIS — R76.11 POSITIVE TB TEST: ICD-10-CM

## 2022-05-25 ENCOUNTER — HOSPITAL ENCOUNTER (OUTPATIENT)
Dept: LAB | Facility: MEDICAL CENTER | Age: 37
End: 2022-05-25
Attending: STUDENT IN AN ORGANIZED HEALTH CARE EDUCATION/TRAINING PROGRAM
Payer: COMMERCIAL

## 2022-05-25 ENCOUNTER — OFFICE VISIT (OUTPATIENT)
Dept: MEDICAL GROUP | Facility: MEDICAL CENTER | Age: 37
End: 2022-05-25
Payer: COMMERCIAL

## 2022-05-25 VITALS
WEIGHT: 211 LBS | DIASTOLIC BLOOD PRESSURE: 72 MMHG | HEIGHT: 63 IN | OXYGEN SATURATION: 98 % | BODY MASS INDEX: 37.39 KG/M2 | TEMPERATURE: 97.2 F | HEART RATE: 72 BPM | RESPIRATION RATE: 16 BRPM | SYSTOLIC BLOOD PRESSURE: 116 MMHG

## 2022-05-25 DIAGNOSIS — R73.03 PREDIABETES: ICD-10-CM

## 2022-05-25 DIAGNOSIS — E28.2 PCOS (POLYCYSTIC OVARIAN SYNDROME): ICD-10-CM

## 2022-05-25 DIAGNOSIS — E66.09 CLASS 2 OBESITY DUE TO EXCESS CALORIES WITHOUT SERIOUS COMORBIDITY WITH BODY MASS INDEX (BMI) OF 37.0 TO 37.9 IN ADULT: ICD-10-CM

## 2022-05-25 DIAGNOSIS — R76.11 POSITIVE TB TEST: ICD-10-CM

## 2022-05-25 LAB
ALBUMIN SERPL BCP-MCNC: 4.1 G/DL (ref 3.2–4.9)
ALBUMIN/GLOB SERPL: 1.5 G/DL
ALP SERPL-CCNC: 61 U/L (ref 30–99)
ALT SERPL-CCNC: 41 U/L (ref 2–50)
ANION GAP SERPL CALC-SCNC: 10 MMOL/L (ref 7–16)
AST SERPL-CCNC: 28 U/L (ref 12–45)
BILIRUB SERPL-MCNC: 0.5 MG/DL (ref 0.1–1.5)
BUN SERPL-MCNC: 10 MG/DL (ref 8–22)
CALCIUM SERPL-MCNC: 8.7 MG/DL (ref 8.5–10.5)
CHLORIDE SERPL-SCNC: 108 MMOL/L (ref 96–112)
CHOLEST SERPL-MCNC: 156 MG/DL (ref 100–199)
CO2 SERPL-SCNC: 21 MMOL/L (ref 20–33)
CREAT SERPL-MCNC: 0.54 MG/DL (ref 0.5–1.4)
EST. AVERAGE GLUCOSE BLD GHB EST-MCNC: 114 MG/DL
FASTING STATUS PATIENT QL REPORTED: NORMAL
GFR SERPLBLD CREATININE-BSD FMLA CKD-EPI: 121 ML/MIN/1.73 M 2
GLOBULIN SER CALC-MCNC: 2.7 G/DL (ref 1.9–3.5)
GLUCOSE SERPL-MCNC: 95 MG/DL (ref 65–99)
HBA1C MFR BLD: 5.6 % (ref 4–5.6)
HDLC SERPL-MCNC: 46 MG/DL
LDLC SERPL CALC-MCNC: 95 MG/DL
POTASSIUM SERPL-SCNC: 4.4 MMOL/L (ref 3.6–5.5)
PROT SERPL-MCNC: 6.8 G/DL (ref 6–8.2)
SODIUM SERPL-SCNC: 139 MMOL/L (ref 135–145)
TRIGL SERPL-MCNC: 77 MG/DL (ref 0–149)

## 2022-05-25 PROCEDURE — 82043 UR ALBUMIN QUANTITATIVE: CPT

## 2022-05-25 PROCEDURE — 86480 TB TEST CELL IMMUN MEASURE: CPT

## 2022-05-25 PROCEDURE — 83036 HEMOGLOBIN GLYCOSYLATED A1C: CPT

## 2022-05-25 PROCEDURE — 99214 OFFICE O/P EST MOD 30 MIN: CPT | Performed by: STUDENT IN AN ORGANIZED HEALTH CARE EDUCATION/TRAINING PROGRAM

## 2022-05-25 PROCEDURE — 36415 COLL VENOUS BLD VENIPUNCTURE: CPT

## 2022-05-25 PROCEDURE — 82570 ASSAY OF URINE CREATININE: CPT

## 2022-05-25 PROCEDURE — 80053 COMPREHEN METABOLIC PANEL: CPT

## 2022-05-25 PROCEDURE — 80061 LIPID PANEL: CPT

## 2022-05-25 RX ORDER — PHENTERMINE HYDROCHLORIDE 15 MG/1
15 CAPSULE ORAL EVERY MORNING
Qty: 30 CAPSULE | Refills: 0 | Status: SHIPPED | OUTPATIENT
Start: 2022-05-25 | End: 2022-06-24

## 2022-05-25 ASSESSMENT — FIBROSIS 4 INDEX: FIB4 SCORE: 0.38

## 2022-05-25 ASSESSMENT — PATIENT HEALTH QUESTIONNAIRE - PHQ9: CLINICAL INTERPRETATION OF PHQ2 SCORE: 0

## 2022-05-25 NOTE — PROGRESS NOTES
"Subjective:     CC: weight    HPI:   Naomi presents today with     Weight concerns  Patient presents today with concerns about her weight.  Patient's BMI is at 37.  Patient is frustrated due to uncontrolled PCOS, difficulty getting on birth control to regulate hormones and feeling that she cannot get any help with her weight gain.  Patient was placed on metformin at last visit and states that it made her sick.  Patient has been researching PCOS online and starting using insitol which is helping with some of her symptoms.  Patient has appointment with GYN today to further evaluate birth control options.    Patient notes that she is working on diet and exercise but continues to gain weight.  Patient is aware of the consequences of her weight gain causing increased joint pain and putting her at risk for chronic conditions.  Patient interested in trying medically assisted weight loss.      ROS:  Gen: no fevers/chills  Pulm: no sob, no cough  CV: no chest pain, no palpitation  Neuro: no headaches      Objective:     Exam:  /72 (BP Location: Right arm, Patient Position: Sitting, BP Cuff Size: Adult)   Pulse 72   Temp 36.2 °C (97.2 °F) (Temporal)   Resp 16   Ht 1.6 m (5' 3\")   Wt 95.7 kg (211 lb)   SpO2 98%   BMI 37.38 kg/m²  Body mass index is 37.38 kg/m².    Gen: Alert and oriented, No apparent distress.  Neck: Neck is supple without lymphadenopathy.  Lungs: Normal effort, CTA bilaterally, no wheezes, rhonchi, or rales  CV: Regular rate and rhythm. No murmurs, rubs, or gallops.  Ext: No clubbing, cyanosis, edema.      Assessment & Plan:     36 y.o. female with the following -      1. Class 2 obesity due to excess calories without serious comorbidity with body mass index (BMI) of 37.0 to 37.9 in adult  Chronic, uncontrolled.  Patient is interested in trialing medication for weight loss.  Thorough discussion with patient about the pros and cons of this medication.  Patient is advised that this is a " stimulant, needs to be taken in the morning and can cause palpitations or increased anxiety.  We will follow-up in 1 month to check in on weight loss and further evaluate how medication is working.  Patient is aware that this is a controlled substance.  PDMP reviewed.  - phentermine 15 MG capsule; Take 1 Capsule by mouth every morning for 30 days.  Dispense: 30 Capsule; Refill: 0    2. PCOS (polycystic ovarian syndrome)  Chronic, stable.  Patient following up with GYN later today.    Return in about 4 weeks (around 6/22/2022).    Please note that this dictation was created using voice recognition software. I have made every reasonable attempt to correct obvious errors, but I expect that there are errors of grammar and possibly content that I did not discover before finalizing the note.

## 2022-05-26 LAB
CREAT UR-MCNC: 111.62 MG/DL
GAMMA INTERFERON BACKGROUND BLD IA-ACNC: 0.09 IU/ML
M TB IFN-G BLD-IMP: NEGATIVE
M TB IFN-G CD4+ BCKGRND COR BLD-ACNC: 0.02 IU/ML
MICROALBUMIN UR-MCNC: 1.2 MG/DL
MICROALBUMIN/CREAT UR: 11 MG/G (ref 0–30)
MITOGEN IGNF BCKGRD COR BLD-ACNC: >10 IU/ML
QFT TB2 - NIL TBQ2: -0.01 IU/ML

## 2022-06-21 ENCOUNTER — OFFICE VISIT (OUTPATIENT)
Dept: DERMATOLOGY | Facility: IMAGING CENTER | Age: 37
End: 2022-06-21
Payer: COMMERCIAL

## 2022-06-21 DIAGNOSIS — D22.9 MULTIPLE NEVI: ICD-10-CM

## 2022-06-21 DIAGNOSIS — D48.9 NEOPLASM OF UNCERTAIN BEHAVIOR: ICD-10-CM

## 2022-06-21 DIAGNOSIS — L81.4 LENTIGINES: ICD-10-CM

## 2022-06-21 DIAGNOSIS — L82.1 SK (SEBORRHEIC KERATOSIS): ICD-10-CM

## 2022-06-21 DIAGNOSIS — Z12.83 SKIN CANCER SCREENING: ICD-10-CM

## 2022-06-21 DIAGNOSIS — D18.01 CHERRY ANGIOMA: ICD-10-CM

## 2022-06-21 PROCEDURE — 11102 TANGNTL BX SKIN SINGLE LES: CPT | Performed by: NURSE PRACTITIONER

## 2022-06-21 PROCEDURE — 11103 TANGNTL BX SKIN EA SEP/ADDL: CPT | Performed by: NURSE PRACTITIONER

## 2022-06-21 PROCEDURE — 99213 OFFICE O/P EST LOW 20 MIN: CPT | Mod: 25 | Performed by: NURSE PRACTITIONER

## 2022-06-21 NOTE — PROGRESS NOTES
DERMATOLOGY NOTE  NEW VISIT       Chief complaint: Establish Care and Skin Lesion     HPI: mole   Location:  Back   Time present:  Unknown   Painful lesion: No  Itching lesion: No  Enlarging lesion: Yes  Anything make it better or worse?no     HPI:  Skin lesion   Location:  Right cheek   Time present: 6 months   Painful lesion: No  Itching lesion: No  Enlarging lesion: Yes  Anything make it better or worse?no       History of skin cancer: No  History of precancers/actinic keratoses: No  History of biopsies:No  History of blistering/severe sunburns:No  Family history of skin cancer:No  Family history of atypical moles:No      No Known Allergies     MEDICATIONS:  Medications relevant to specialty reviewed.     REVIEW OF SYSTEMS:   Positive for skin (see HPI)  Negative for fevers and chills       EXAM:  There were no vitals taken for this visit.  Constitutional: Well-developed, well-nourished, and in no distress.     A total body skin exam was performed excluding the genitals per patient preference and including the following areas: head (including face), neck, chest, abdomen, groin/buttocks, back, bilateral upper extremities, and bilateral lower extremities with the following pertinent findings listed below and/or in assessment/plan.       -sun exposed skin of trunk and b/l upper, lower extremities and face with scattered clinically benign light brown reticulated macules all of which were morphologically similar and none of which were suspicious for skin cancer today on exam  Several scattered 1-3mm bright red macules and thin papules on the trunk  Multiple medium brown dark brown macules papules scattered over the trunk, face and extremities, All with benign-appearing pigment network patterns on dermoscopy  Medium brown waxy stuck-on appearing papule right cheek   4 mm brown and grey papule to left mid back   4 mm dark brown papule left lateral extremity       IMPRESSION / PLAN:    1. Neoplasm of uncertain  behavior  Procedure Note   Procedure: Biopsy by shave technique  Location: L mid back  Size: as noted in exam  Preoperative diagnosis:r/o atypia  Risks, benefits and alternatives of procedure discussed, verbal consent obtained for photo (see chart) and written informed consent obtained for procedure. Time out completed. Area of biopsy prepped with alcohol. Anesthesia with 1% lidocaine with epinephrine administered with 30 gauge needle. Shave biopsy of the site performed. Hemostasis achieved with pressure and aluminum chloride. Vaseline applied to wound with bandage. Patient tolerated procedure well and there were no complications. The specimen was sent to the pathology lab by the staff. Wound care was discussed.    Procedure Note   Procedure: Biopsy by shave technique  Location: LUE  Size: as noted in exam  Preoperative diagnosis:r/o atypia  Risks, benefits and alternatives of procedure discussed, verbal consent obtained for photo (see chart) and written informed consent obtained for procedure. Time out completed. Area of biopsy prepped with alcohol. Anesthesia with 1% lidocaine with epinephrine administered with 30 gauge needle. Shave biopsy of the site performed. Hemostasis achieved with pressure and aluminum chloride. Vaseline applied to wound with bandage. Patient tolerated procedure well and there were no complications. The specimen was sent to the pathology lab by the staff. Wound care was discussed.      2. Multiple nevi  - Benign-appearing nature of lesions discussed during exam.   - Advised to continue to monitor for any return to clinic for new or concerning changes.  - ABCDE's of melanoma discussed      3. Lentigines  - Benign-appearing nature of lesions discussed during exam.   - Advised to continue to monitor for any return to clinic for new or concerning changes.      4. Cherry angioma  - Benign-appearing nature of lesions discussed during exam.   - Advised to continue to monitor for any return to  clinic for new or concerning changes.      5. SK (seborrheic keratosis)  - Benign-appearing nature of lesions discussed during exam.   - Advised to continue to monitor for any return to clinic for new or concerning changes.      6. Skin cancer screening  Skin cancer education  - discussed importance of sun protective clothing, eyewear in addition to the use of broad spectrum sunscreen with SPF 30 or greater, as well as need for reapplication ~every 2 hours when exposed to UVR  - discussed importance following up for any new or changing lesions as noted in handout given, but every 12 months exams in clinic in the setting of dermatologic history  - ABCDE's of melanoma discussed/handout given          Discussed risks, benefits, alternative treatments as well as common side effects associated with prescribed treatment, Patient verbalized understanding and agrees with plan regarding the above              Please note that this dictation was created using voice recognition software. I have made every reasonable attempt to correct obvious errors, but I expect that there are errors of grammar and possibly content that I did not discover before finalizing the note.      Return to clinic in: Return in about 1 year (around 6/21/2023) for ABEL and pending bx results. and as needed for any new or changing skin lesions.

## 2022-06-24 ENCOUNTER — TELEPHONE (OUTPATIENT)
Dept: DERMATOLOGY | Facility: IMAGING CENTER | Age: 37
End: 2022-06-24
Payer: COMMERCIAL

## 2022-06-24 NOTE — TELEPHONE ENCOUNTER
Patient notified of D- Path results Bx left Mid- back and Left upper extremity  Both benign . Scanned in media tab

## 2022-07-15 ENCOUNTER — APPOINTMENT (OUTPATIENT)
Dept: MEDICAL GROUP | Facility: MEDICAL CENTER | Age: 37
End: 2022-07-15
Payer: COMMERCIAL

## 2022-09-01 ENCOUNTER — OFFICE VISIT (OUTPATIENT)
Dept: MEDICAL GROUP | Facility: MEDICAL CENTER | Age: 37
End: 2022-09-01
Payer: COMMERCIAL

## 2022-09-01 VITALS
HEART RATE: 81 BPM | SYSTOLIC BLOOD PRESSURE: 124 MMHG | BODY MASS INDEX: 36.68 KG/M2 | RESPIRATION RATE: 16 BRPM | HEIGHT: 63 IN | OXYGEN SATURATION: 96 % | WEIGHT: 207 LBS | DIASTOLIC BLOOD PRESSURE: 72 MMHG | TEMPERATURE: 97.2 F

## 2022-09-01 DIAGNOSIS — R11.0 NAUSEA: ICD-10-CM

## 2022-09-01 DIAGNOSIS — R23.4 BREAST SKIN CHANGES: ICD-10-CM

## 2022-09-01 PROCEDURE — 99214 OFFICE O/P EST MOD 30 MIN: CPT | Performed by: STUDENT IN AN ORGANIZED HEALTH CARE EDUCATION/TRAINING PROGRAM

## 2022-09-01 RX ORDER — CLOTRIMAZOLE 1 %
1 CREAM (GRAM) TOPICAL 2 TIMES DAILY
Qty: 40 G | Refills: 0 | Status: SHIPPED | OUTPATIENT
Start: 2022-09-01 | End: 2022-11-16

## 2022-09-01 RX ORDER — BUSPIRONE HYDROCHLORIDE 10 MG/1
40 TABLET ORAL
COMMUNITY
Start: 2022-07-15 | End: 2023-11-07

## 2022-09-01 RX ORDER — ONDANSETRON 4 MG/1
4 TABLET, FILM COATED ORAL EVERY 4 HOURS PRN
Qty: 20 TABLET | Refills: 0 | Status: SHIPPED | OUTPATIENT
Start: 2022-09-01 | End: 2022-10-01

## 2022-09-01 RX ORDER — LOPERAMIDE HYDROCHLORIDE 2 MG/1
2 CAPSULE ORAL 4 TIMES DAILY PRN
Qty: 30 CAPSULE | Refills: 0 | Status: SHIPPED
Start: 2022-09-01 | End: 2023-04-27

## 2022-09-01 ASSESSMENT — FIBROSIS 4 INDEX: FIB4 SCORE: 0.53

## 2022-09-01 NOTE — PROGRESS NOTES
"Subjective:     Chief Complaint   Patient presents with    Diarrhea     Nausea/ fatigue    Loss of Appetite    Dizziness         HPI:   Naomi presents today with    Diarrhea  Patient presents for concern about diarrhea x3 weeks.  Patient notes that she is having approximately 2 watery bowel movements per day for the last 3 weeks.  He notes no episodes of nausea or vomiting during this time.  Patient also presents concern for dizziness.  Patient notes that 5 days ago she fine but while taking a hot shower became lightheaded and had to get out of the shower and sat down to drink some water.  Patient notes that dizziness resolved within a few minutes, no syncopal episode.  Patient notes later that day she was at [employer] walking around and again became dizzy.  Patient has tried Pepto-Bismol with some relief in bowel movements.  Patient notes no changes in urination.  Patient notes low appetite      Breast change  Patient with a skin change on her left breast around 8 o'clock position.  Skin is erythematous, no rash on right breast or underneath breast.            ROS:  Gen: no fevers/chills  Pulm: no sob, no cough  CV: no chest pain, no palpitations  GI: no nausea/vomiting, + diarrhea        Objective:     Exam:  /72 (BP Location: Right arm, Patient Position: Sitting, BP Cuff Size: Adult)   Pulse 81   Temp 36.2 °C (97.2 °F) (Temporal)   Resp 16   Ht 1.6 m (5' 3\")   Wt 93.9 kg (207 lb)   SpO2 96%   BMI 36.67 kg/m²  Body mass index is 36.67 kg/m².    Gen: Alert and oriented, No apparent distress.  Neck: Neck is supple without lymphadenopathy.  Lungs: Normal effort, CTA bilaterally, no wheezes, rhonchi, or rales  CV: Regular rate and rhythm. No murmurs, rubs, or gallops.  Ext: No clubbing, cyanosis, edema.        Assessment & Plan:     36 y.o. female with the following -     1. Breast skin changes  - clotrimazole (LOTRIMIN) 1 % Cream; Apply 1 Application topically 2 times a day.  Dispense: 40 g; Refill: 0  - " US-BREAST LIMITED-LEFT; Future    2. Nausea  Acute, stable.  Patient encouraged to increase fiber and trial medication for relief of nausea/diarrhea.  Discussed dietary changes that may help to eliminate bowel discomfort.  Patient advised to follow-up if no improvement.  - ondansetron (ZOFRAN) 4 MG Tab tablet; Take 1 Tablet by mouth every four hours as needed for Nausea/Vomiting for up to 30 days.  Dispense: 20 Tablet; Refill: 0  - loperamide (IMODIUM) 2 MG Cap; Take 1 Capsule by mouth 4 times a day as needed for Diarrhea.  Dispense: 30 Capsule; Refill: 0     No follow-ups on file.    Please note that this dictation was created using voice recognition software. I have made every reasonable attempt to correct obvious errors, but I expect that there are errors of grammar and possibly content that I did not discover before finalizing the note.

## 2022-09-09 ENCOUNTER — APPOINTMENT (OUTPATIENT)
Dept: RADIOLOGY | Facility: MEDICAL CENTER | Age: 37
End: 2022-09-09
Attending: STUDENT IN AN ORGANIZED HEALTH CARE EDUCATION/TRAINING PROGRAM
Payer: COMMERCIAL

## 2022-09-14 ENCOUNTER — APPOINTMENT (OUTPATIENT)
Dept: RADIOLOGY | Facility: MEDICAL CENTER | Age: 37
End: 2022-09-14
Attending: STUDENT IN AN ORGANIZED HEALTH CARE EDUCATION/TRAINING PROGRAM
Payer: COMMERCIAL

## 2022-10-27 ENCOUNTER — HOSPITAL ENCOUNTER (OUTPATIENT)
Dept: RADIOLOGY | Facility: MEDICAL CENTER | Age: 37
End: 2022-10-27
Attending: STUDENT IN AN ORGANIZED HEALTH CARE EDUCATION/TRAINING PROGRAM
Payer: COMMERCIAL

## 2022-10-27 DIAGNOSIS — R23.4 BREAST SKIN CHANGES: ICD-10-CM

## 2022-10-27 PROCEDURE — 76642 ULTRASOUND BREAST LIMITED: CPT | Mod: LT

## 2022-10-27 PROCEDURE — G0279 TOMOSYNTHESIS, MAMMO: HCPCS

## 2022-11-17 DIAGNOSIS — R23.4 BREAST SKIN CHANGES: ICD-10-CM

## 2022-11-22 ENCOUNTER — HOSPITAL ENCOUNTER (OUTPATIENT)
Facility: MEDICAL CENTER | Age: 37
End: 2022-11-22
Attending: OBSTETRICS & GYNECOLOGY
Payer: COMMERCIAL

## 2022-11-22 ENCOUNTER — OFFICE VISIT (OUTPATIENT)
Dept: MEDICAL GROUP | Facility: MEDICAL CENTER | Age: 37
End: 2022-11-22
Payer: COMMERCIAL

## 2022-11-22 ENCOUNTER — GYNECOLOGY VISIT (OUTPATIENT)
Dept: OBGYN | Facility: CLINIC | Age: 37
End: 2022-11-22
Payer: COMMERCIAL

## 2022-11-22 VITALS
WEIGHT: 207 LBS | RESPIRATION RATE: 16 BRPM | OXYGEN SATURATION: 98 % | BODY MASS INDEX: 36.68 KG/M2 | TEMPERATURE: 97.7 F | HEART RATE: 96 BPM | SYSTOLIC BLOOD PRESSURE: 124 MMHG | DIASTOLIC BLOOD PRESSURE: 72 MMHG | HEIGHT: 63 IN

## 2022-11-22 VITALS — SYSTOLIC BLOOD PRESSURE: 147 MMHG | BODY MASS INDEX: 37.02 KG/M2 | DIASTOLIC BLOOD PRESSURE: 84 MMHG | WEIGHT: 209 LBS

## 2022-11-22 DIAGNOSIS — E28.2 PCOS (POLYCYSTIC OVARIAN SYNDROME): ICD-10-CM

## 2022-11-22 DIAGNOSIS — R73.03 PREDIABETES: ICD-10-CM

## 2022-11-22 DIAGNOSIS — N89.8 VAGINAL DISCHARGE: ICD-10-CM

## 2022-11-22 DIAGNOSIS — E66.09 CLASS 2 OBESITY DUE TO EXCESS CALORIES WITHOUT SERIOUS COMORBIDITY WITH BODY MASS INDEX (BMI) OF 36.0 TO 36.9 IN ADULT: ICD-10-CM

## 2022-11-22 DIAGNOSIS — Z23 NEED FOR VACCINATION: ICD-10-CM

## 2022-11-22 DIAGNOSIS — R23.4 BREAST SKIN CHANGES: ICD-10-CM

## 2022-11-22 DIAGNOSIS — Z01.419 WELL WOMAN EXAM WITH ROUTINE GYNECOLOGICAL EXAM: ICD-10-CM

## 2022-11-22 PROCEDURE — 99214 OFFICE O/P EST MOD 30 MIN: CPT | Mod: 25 | Performed by: STUDENT IN AN ORGANIZED HEALTH CARE EDUCATION/TRAINING PROGRAM

## 2022-11-22 PROCEDURE — 87624 HPV HI-RISK TYP POOLED RSLT: CPT

## 2022-11-22 PROCEDURE — 99395 PREV VISIT EST AGE 18-39: CPT | Performed by: OBSTETRICS & GYNECOLOGY

## 2022-11-22 PROCEDURE — 87660 TRICHOMONAS VAGIN DIR PROBE: CPT

## 2022-11-22 PROCEDURE — 87480 CANDIDA DNA DIR PROBE: CPT

## 2022-11-22 PROCEDURE — 87510 GARDNER VAG DNA DIR PROBE: CPT

## 2022-11-22 PROCEDURE — 90686 IIV4 VACC NO PRSV 0.5 ML IM: CPT | Performed by: STUDENT IN AN ORGANIZED HEALTH CARE EDUCATION/TRAINING PROGRAM

## 2022-11-22 PROCEDURE — 88175 CYTOPATH C/V AUTO FLUID REDO: CPT

## 2022-11-22 PROCEDURE — 90471 IMMUNIZATION ADMIN: CPT | Performed by: STUDENT IN AN ORGANIZED HEALTH CARE EDUCATION/TRAINING PROGRAM

## 2022-11-22 NOTE — PROGRESS NOTES
"Subjective:     Chief Complaint   Patient presents with    Rash         HPI:   Naomi presents today with     Rash  Patient presented at last visit with a rash on her left breast 8o'clock position.  Patient skin was erythematous.  Patient was started on Lotrimin cream.  Mammogram and ultrasound was ordered to further evaluate with no mammographic evidence of malignancy or abnormality.  Patient notes that Lotrimin cream has been helpful and the rash goes away within 3 to 4 days of use.  Patient has also been switching out her bras more frequently.  Rash not present today.    Weight  5/25 patient presented for concerns about weight.  Patient's BMI at that time was 37.38.  Patient was placed on phentermine 15 mg for 30 days.  Patient only use this for 1 month states that she did not feel a difference.  Patient notes no change in her appetite while on the medication.  Discussed with patient other medications for weight loss including increased dose of phentermine or Ozempic if covered.  Patient is interested in trying Ozempic.  Patient does have history of prediabetes PCOS and obesity.      ROS:  Gen: no fevers/chills  Pulm: no sob, no cough  CV: no chest pain, no palpitations  GI: no nausea/vomiting, no diarrhea        Objective:     Exam:  /72 (BP Location: Left arm, Patient Position: Sitting, BP Cuff Size: Adult)   Pulse 96   Temp 36.5 °C (97.7 °F) (Temporal)   Resp 16   Ht 1.6 m (5' 3\")   Wt 93.9 kg (207 lb)   SpO2 98%   BMI 36.67 kg/m²  Body mass index is 36.67 kg/m².    Gen: Alert and oriented, No apparent distress.  Neck: Neck is supple without lymphadenopathy.  Lungs: Normal effort, CTA bilaterally, no wheezes, rhonchi, or rales  CV: Regular rate and rhythm. No murmurs, rubs, or gallops.  Ext: No clubbing, cyanosis, edema.      Assessment & Plan:     37 y.o. female with the following -     1. Class 2 obesity due to excess calories without serious comorbidity with body mass index (BMI) of 37.0 to " 37.9 in adult  Chronic, uncontrolled.  Patient's BMI elevated at 36.  Discussion with patient about medication assisted weight loss to improve BMI.  She has already been working on diet and exercise but frustrated by no change in weight.  Patient notes that she has tried phentermine before and did not feel it was effective.  Patient interested in trying Ozempic.  Discussed with patient that often Ozempic is not covered, patient advised to follow-up if Ozempic is not covered to start on phentermine at a higher dose.  Discussed risks and benefits associated with Ozempic.  Patient advised that she needs to start at 0.25 dose and that she may not feel it is effective until she started on higher dose.  - Semaglutide,0.25 or 0.5MG/DOS, 2 MG/1.5ML Solution Pen-injector; Inject 0.25-0.5 mg under the skin every 7 days. Week 1 through week 4 : 0.25 mg once weekly.Week 5 through week 8: 0.5 mg once weekly.Week 9 through week 12: 1 mg once weekly.  Dispense: 1.5 mL; Refill: 2    2. Prediabetes  - Semaglutide,0.25 or 0.5MG/DOS, 2 MG/1.5ML Solution Pen-injector; Inject 0.25-0.5 mg under the skin every 7 days. Week 1 through week 4 : 0.25 mg once weekly.Week 5 through week 8: 0.5 mg once weekly.Week 9 through week 12: 1 mg once weekly.  Dispense: 1.5 mL; Refill: 2    3. PCOS (polycystic ovarian syndrome)  - Semaglutide,0.25 or 0.5MG/DOS, 2 MG/1.5ML Solution Pen-injector; Inject 0.25-0.5 mg under the skin every 7 days. Week 1 through week 4 : 0.25 mg once weekly.Week 5 through week 8: 0.5 mg once weekly.Week 9 through week 12: 1 mg once weekly.  Dispense: 1.5 mL; Refill: 2    4. Need for vaccination  - INFLUENZA VACCINE QUAD INJ (PF)    5. Breast skin changes  Acute, improved.  Patient skin changes have improved.  Likely secondary to fungal infection.  Discussed with patient this is likely to be worse in the summer months in winter.  Patient continues to use Lotrimin as needed.      Return in about 8 weeks (around  1/17/2023).    Please note that this dictation was created using voice recognition software. I have made every reasonable attempt to correct obvious errors, but I expect that there are errors of grammar and possibly content that I did not discover before finalizing the note.

## 2022-11-22 NOTE — PROGRESS NOTES
New Gynecological Visit    Naomi Medeiroszabebrandee Peters    37 y.o.    Chief complaint    Chief Complaint   Patient presents with    Gynecologic Exam     New Patient        HPI    Patient is a 38 yo G0 here to establish care and discuss her PCOS.   She also has noticed a new green vaginal discharge in the last week.   Change in consistency and a fishy/foul odor. No pelvic pain.   Has had PCOS for many years, always had irregular menses, problems with weight/weight loss in last few years. Difficult to lose weight. Was diagnosed with PCOS many years ago by lab work and findings of hirsutism. Was always prescribed birth control pills and told to lose weight but states was never educated about the diagnosis.   Has had random irregular spotting in between periods. She has not had a period for the past 3 months.   Had nexplanon in arm in place since last 6 years, knows it  3 years ago, and states no clinician has offered to remove it.   Was on OCPs for last 6 months,  was amenorrheic while on pills and 3 weeks ago stopped taking it due to not seeing any improvement in irregular periods and being worried that she was not having a period while on OCPs.  Also was on Metformin but could not tolerate due to GI side effects.   When she was switched to OCPS, noticed she gained about 60 lbs. Has struggled to keep weight down. Has tried improving diet, decreasing carbs. Chronic back pain so prevents her from doing exercise. Also has been prescribed phentermine for several months and discontinued on her own due  to no improvement in weight. She also had searched online and came across a treatment for PCOS called inositol. 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   She mentioned she did see an endocrinologist many years ago once.   Chin hair growth and hair loss in temple area chronically.   No cold intolerance.  + heat intolerance.   Not interested in future fertility.   Not sexually active.      Review of Systems:  Review of Systems   All other systems reviewed and are negative.     Past Obstetrical History:    G0    Past Gynecological History:    Last pap: HPV positive, normal colposcopies/biopsy, 2019    H/o abnormal pap: No    H/o STIs: No    DEXA: N/A    Last Mammogram: N/A    LMP: No LMP recorded (lmp unknown). (Menstrual status: Irregular Menses).    BCM: None    Past Medical History    Past Medical History:   Diagnosis Date    Anxiety     Depression     PCOS (polycystic ovarian syndrome)        Past Surgical History    Past Surgical History:   Procedure Laterality Date    OTHER      back surgery       Family History   Problem Relation Age of Onset    Diabetes Mother     Cancer Mother         ovarian    Anxiety disorder Mother     Depression Mother     Alcohol abuse Father     Alcohol abuse Brother     Schizophrenia Brother     Anxiety disorder Brother     Depression Brother     Cancer Paternal Aunt         breast    Heart Disease Maternal Grandmother     Cancer Paternal Grandmother         pancreatic       Allergies    No Known Allergies    Medications    Current Outpatient Medications   Medication Sig Dispense Refill    Semaglutide,0.25 or 0.5MG/DOS, 2 MG/1.5ML Solution Pen-injector Inject 0.25-0.5 mg under the skin every 7 days. Week 1 through week 4 : 0.25 mg once weekly.Week 5 through week 8: 0.5 mg once weekly.Week 9 through week 12: 1 mg once weekly. 1.5 mL 2    clotrimazole (LOTRIMIN) 1 % Cream APPLY ONE APPLICATION TOPICALLY TO THE AFFECTED AREA(S) TWO TIMES A DAY 30 g 0    busPIRone (BUSPAR) 10 MG Tab tablet 40 mg.      loperamide (IMODIUM) 2 MG Cap Take 1 Capsule by mouth 4 times a day as needed for Diarrhea. 30 Capsule 0    Brexpiprazole (REXULTI) 1 MG Tab Take  by mouth.      venlafaxine ER (EFFEXOR) 225 MG TABLET SR 24 HR tablet Take 1 tablet by mouth every day. 30 tablet 0     No current facility-administered medications for this visit.       Social  Social History     Tobacco Use     Smoking status: Never    Smokeless tobacco: Never   Vaping Use    Vaping Use: Never used   Substance Use Topics    Alcohol use: Yes     Comment: rarely, one drink every 2 months    Drug use: No        OBJECTIVE:    Vitals    BP (!) 147/84   Wt 209 lb   LMP  (LMP Unknown)   BMI 37.02 kg/m²     Physical Exam    GENERAL: Well developed, well nourished, female in no acute distress.    HEENT: NCAT, mucus membranes moist    Neck: Supple, nontender, no KAIRNA, no thyromegaly    Breasts: Symmetric, Nontender, no masses, no nipple discharge, no skin changes    CV: RRR    Pulm: CTAB    Abdomen: Soft ND NT.    Ext: BECKHAM    : Normal Vulva, vagina. No lesions present. No abnormal discharge. No blood.    Cervix smooth pink friable with grey white mucus discharge present at os. Pap, vaginitis swab collected.     Uterus small midline AV nontender    Adnexa no masses or tenderness.     Labs/Pathology:    None    A/P    Patient Active Problem List   Diagnosis    SANDI (generalized anxiety disorder)    PCOS (polycystic ovarian syndrome)    Eczema    Chronic low back pain    Obesity (BMI 30-39.9)    Family history of systemic lupus erythematosus    MDD (major depressive disorder), recurrent, in partial remission (HCC)    Family history of ovarian cancer    Family history of breast cancer    Nonintractable migraine    Positive TB test    BRCA gene mutation negative    Concentration deficit    Prediabetes    Attention deficit hyperactivity disorder (ADHD), inattentive type, mild    Pharyngitis due to Streptococcus species       Naomi Peters    37 y.o.        1. Well woman exam with routine gynecological exam - f/u pap, STI screening. Continue yearly pelvic and breast exam. Monthly self breast exam encouraged.    2. PCOS (polycystic ovarian syndrome) - reviewed condition in detail with patient, specifically caused by hormonal imbalances often due to obesity, metabolic syndrome causing hyper estrogenic and high insulin  states which suppress ovulation. Also discussed high testosterone levels which contribute to hirsutism. Discussed link of untreated PCOS to infertility, gynecologic cancers such as uterine or ovarian cancer. Weight loss is a crucial component to treatment, patient understands this. Plan to check labs and pelvic US below as patient has not had a workup of this in some time. If ultrasound showing thickened endometrium, may consider EMB in future to r/o hyperplasia. Reassured patient that amenorrhea during treatment with OCPs is normal, and we may consider this in near future after initial workup complete. May also consider referral to endocrinology for further input regarding management.    3. Vaginal discharge - f/u vaginitis swab.      Orders Placed This Encounter    US-PELVIC COMPLETE (TRANSABDOMINAL/TRANSVAGINAL) (COMBO)    THINPREP PAP W/HPV     FSH/LH    TESTOSTERONE F&T FEMALES/CHILD    HEMOGLOBIN A1C    CBC WITHOUT DIFFERENTIAL    Comp Metabolic Panel    TSH    DHEA SULFATE    PROLACTIN    ESTRADIOL    HCG QUANTITATIVE    VAGINAL PATHOGENS DNA PANEL        RTC after labs, ultrasound complete.       Total time spent is 40 minutes.        James Chaney M.D.    Obstetrics and Gynecology    11/22/202210:51 AM

## 2022-11-23 DIAGNOSIS — N76.0 BACTERIAL VAGINOSIS: ICD-10-CM

## 2022-11-23 DIAGNOSIS — B96.89 BACTERIAL VAGINOSIS: ICD-10-CM

## 2022-11-23 LAB
CANDIDA DNA VAG QL PROBE+SIG AMP: NEGATIVE
CYTOLOGY REG CYTOL: ABNORMAL
G VAGINALIS DNA VAG QL PROBE+SIG AMP: POSITIVE
HPV HR 12 DNA CVX QL NAA+PROBE: POSITIVE
HPV16 DNA SPEC QL NAA+PROBE: NEGATIVE
HPV18 DNA SPEC QL NAA+PROBE: NEGATIVE
SPECIMEN SOURCE: ABNORMAL
T VAGINALIS DNA VAG QL PROBE+SIG AMP: NEGATIVE

## 2022-11-23 RX ORDER — METRONIDAZOLE 500 MG/1
500 TABLET ORAL 2 TIMES DAILY
Qty: 14 TABLET | Refills: 0 | Status: SHIPPED | OUTPATIENT
Start: 2022-11-23 | End: 2022-11-30

## 2022-11-29 ENCOUNTER — OFFICE VISIT (OUTPATIENT)
Dept: DERMATOLOGY | Facility: IMAGING CENTER | Age: 37
End: 2022-11-29
Payer: COMMERCIAL

## 2022-11-29 DIAGNOSIS — B37.2 CANDIDAL INTERTRIGO: ICD-10-CM

## 2022-11-29 PROCEDURE — 99213 OFFICE O/P EST LOW 20 MIN: CPT | Performed by: NURSE PRACTITIONER

## 2022-11-29 RX ORDER — NYSTATIN 100000 U/G
CREAM TOPICAL
Qty: 30 G | Refills: 3 | Status: SHIPPED | OUTPATIENT
Start: 2022-11-29 | End: 2023-04-21 | Stop reason: SDUPTHER

## 2022-11-30 NOTE — PROGRESS NOTES
DERMATOLOGY NOTE  NEW VISIT       Chief complaint: Establish Care and Rash       HPI:rash on groin and chest   Onset: 2 mths   Symptoms: itchy, redness bumpy, hot,   Aggravating factors: n/a   Alleviating factors: n/a   Other exposures: no  Treatments: clotrimazole cream- helps tried for two weeks keeps coming back     No Known Allergies     MEDICATIONS:  Medications relevant to specialty reviewed.     REVIEW OF SYSTEMS:   Positive for skin (see HPI)  Negative for fevers and chills     EXAM:  LMP  (LMP Unknown)   Constitutional: Well-developed, well-nourished, and in no distress.     A focused skin exam was performed including the affected areas of the chest and groin. Notable findings on exam today listed below and/or in assessment/plan.     Mild erythema to bilateral inframammary folds and lower abdominal folds    IMPRESSION / PLAN:    1. Candidal intertrigo  Discussed course/nature of disease  Stressed importance of keeping area clean and dry  Rx below  Follow up if no improvement    - nystatin (MYCOSTATIN) [id number] UNIT/GM Cream topical cream; AAA twice a day for 2 weeks, or until clear then can use as needed  Dispense: 30 g; Refill: 3    Patient verbalized understanding and agrees with plan regarding the above            Please note that this dictation was created using voice recognition software. I have made every reasonable attempt to correct obvious errors, but I expect that there are errors of grammar and possibly content that I did not discover before finalizing the note.      Return to clinic in: Return for PRN if no improvement. and as needed for any new or changing skin lesions.

## 2023-01-03 ENCOUNTER — APPOINTMENT (OUTPATIENT)
Dept: RADIOLOGY | Facility: MEDICAL CENTER | Age: 38
End: 2023-01-03
Attending: OBSTETRICS & GYNECOLOGY
Payer: COMMERCIAL

## 2023-04-07 ENCOUNTER — GYNECOLOGY VISIT (OUTPATIENT)
Dept: OBGYN | Facility: CLINIC | Age: 38
End: 2023-04-07
Payer: COMMERCIAL

## 2023-04-07 DIAGNOSIS — Z30.46 NEXPLANON REMOVAL: Primary | ICD-10-CM

## 2023-04-07 PROCEDURE — 11982 REMOVE DRUG IMPLANT DEVICE: CPT | Performed by: STUDENT IN AN ORGANIZED HEALTH CARE EDUCATION/TRAINING PROGRAM

## 2023-04-07 NOTE — PROCEDURES
Nexplanon Removal Procedure Note    Patient presents for removal of Nexplanon, has had in place for 5-6 years and desires to take a break from hormones.  R/b/a discussed with patient, including pain during removal, bleeding, bruising, and inability to locate/remove.  Procedure discussed at length with patient, including use of local anesthetic.  Implant is in the patient's left upper arm.      Vitals:    04/07/23 1330   BP: (P) 122/77   BP Location: (P) Right arm   Patient Position: (P) Sitting   BP Cuff Size: (P) Adult   Weight: (P) 219 lb       The patient was positioned on the examination table with her left arm flexed at the elbow and externally rotated so that her wrist was parallel to her ear.  The Implanon was easily palpated along the medial aspect of the left upper arm and felt to be superficial.  The skin over the distal end of the implant was cleansed with betadine and 2mL of 1% lidocaine without epinephrine was injected subcutaneously.      A small incision was made and the implant was removed with mosquito forceps.  Minimal blood loss.  A bandage was placed over the removal site.  Patient tolerated the procedure well.     Neo lLamas D.O.

## 2023-04-21 DIAGNOSIS — B37.2 CANDIDAL INTERTRIGO: ICD-10-CM

## 2023-04-24 ENCOUNTER — HOSPITAL ENCOUNTER (OUTPATIENT)
Dept: RADIOLOGY | Facility: MEDICAL CENTER | Age: 38
End: 2023-04-24
Attending: OBSTETRICS & GYNECOLOGY
Payer: COMMERCIAL

## 2023-04-24 DIAGNOSIS — E28.2 PCOS (POLYCYSTIC OVARIAN SYNDROME): ICD-10-CM

## 2023-04-24 PROCEDURE — 76830 TRANSVAGINAL US NON-OB: CPT

## 2023-04-24 RX ORDER — NYSTATIN 100000 U/G
CREAM TOPICAL
Qty: 30 G | Refills: 3 | Status: SHIPPED | OUTPATIENT
Start: 2023-04-24 | End: 2023-07-25

## 2023-04-27 ENCOUNTER — HOSPITAL ENCOUNTER (OUTPATIENT)
Dept: LAB | Facility: MEDICAL CENTER | Age: 38
End: 2023-04-27
Attending: OBSTETRICS & GYNECOLOGY
Payer: COMMERCIAL

## 2023-04-27 ENCOUNTER — OFFICE VISIT (OUTPATIENT)
Dept: MEDICAL GROUP | Facility: MEDICAL CENTER | Age: 38
End: 2023-04-27
Payer: COMMERCIAL

## 2023-04-27 VITALS
SYSTOLIC BLOOD PRESSURE: 122 MMHG | BODY MASS INDEX: 39.14 KG/M2 | HEIGHT: 63 IN | DIASTOLIC BLOOD PRESSURE: 70 MMHG | WEIGHT: 220.9 LBS | TEMPERATURE: 97.3 F | OXYGEN SATURATION: 96 % | HEART RATE: 87 BPM

## 2023-04-27 DIAGNOSIS — E28.2 PCOS (POLYCYSTIC OVARIAN SYNDROME): ICD-10-CM

## 2023-04-27 DIAGNOSIS — E66.09 CLASS 2 OBESITY DUE TO EXCESS CALORIES WITHOUT SERIOUS COMORBIDITY WITH BODY MASS INDEX (BMI) OF 39.0 TO 39.9 IN ADULT: ICD-10-CM

## 2023-04-27 DIAGNOSIS — R73.03 PREDIABETES: ICD-10-CM

## 2023-04-27 LAB
ALBUMIN SERPL BCP-MCNC: 4.1 G/DL (ref 3.2–4.9)
ALBUMIN/GLOB SERPL: 1.5 G/DL
ALP SERPL-CCNC: 73 U/L (ref 30–99)
ALT SERPL-CCNC: 53 U/L (ref 2–50)
ANION GAP SERPL CALC-SCNC: 11 MMOL/L (ref 7–16)
AST SERPL-CCNC: 32 U/L (ref 12–45)
B-HCG SERPL-ACNC: <1 MIU/ML (ref 0–5)
BILIRUB SERPL-MCNC: 1 MG/DL (ref 0.1–1.5)
BUN SERPL-MCNC: 10 MG/DL (ref 8–22)
CALCIUM ALBUM COR SERPL-MCNC: 8.8 MG/DL (ref 8.5–10.5)
CALCIUM SERPL-MCNC: 8.9 MG/DL (ref 8.5–10.5)
CHLORIDE SERPL-SCNC: 107 MMOL/L (ref 96–112)
CO2 SERPL-SCNC: 22 MMOL/L (ref 20–33)
CREAT SERPL-MCNC: 0.61 MG/DL (ref 0.5–1.4)
DHEA-S SERPL-MCNC: 130 UG/DL (ref 60.9–337)
ERYTHROCYTE [DISTWIDTH] IN BLOOD BY AUTOMATED COUNT: 41.7 FL (ref 35.9–50)
EST. AVERAGE GLUCOSE BLD GHB EST-MCNC: 108 MG/DL
ESTRADIOL SERPL-MCNC: 57.9 PG/ML
FSH SERPL-ACNC: 3.3 MIU/ML
GFR SERPLBLD CREATININE-BSD FMLA CKD-EPI: 117 ML/MIN/1.73 M 2
GLOBULIN SER CALC-MCNC: 2.8 G/DL (ref 1.9–3.5)
GLUCOSE SERPL-MCNC: 95 MG/DL (ref 65–99)
HBA1C MFR BLD: 5.4 % (ref 4–5.6)
HCT VFR BLD AUTO: 46.8 % (ref 37–47)
HGB BLD-MCNC: 15.9 G/DL (ref 12–16)
LH SERPL-ACNC: 5.4 IU/L
MCH RBC QN AUTO: 31.8 PG (ref 27–33)
MCHC RBC AUTO-ENTMCNC: 34 G/DL (ref 33.6–35)
MCV RBC AUTO: 93.6 FL (ref 81.4–97.8)
PLATELET # BLD AUTO: 265 K/UL (ref 164–446)
PMV BLD AUTO: 9.9 FL (ref 9–12.9)
POTASSIUM SERPL-SCNC: 4.4 MMOL/L (ref 3.6–5.5)
PROLACTIN SERPL-MCNC: 28.7 NG/ML (ref 2.8–26)
PROT SERPL-MCNC: 6.9 G/DL (ref 6–8.2)
RBC # BLD AUTO: 5 M/UL (ref 4.2–5.4)
SODIUM SERPL-SCNC: 140 MMOL/L (ref 135–145)
TSH SERPL DL<=0.005 MIU/L-ACNC: 1 UIU/ML (ref 0.38–5.33)
WBC # BLD AUTO: 6.2 K/UL (ref 4.8–10.8)

## 2023-04-27 PROCEDURE — 82670 ASSAY OF TOTAL ESTRADIOL: CPT

## 2023-04-27 PROCEDURE — 99213 OFFICE O/P EST LOW 20 MIN: CPT | Performed by: STUDENT IN AN ORGANIZED HEALTH CARE EDUCATION/TRAINING PROGRAM

## 2023-04-27 PROCEDURE — 83002 ASSAY OF GONADOTROPIN (LH): CPT

## 2023-04-27 PROCEDURE — 84146 ASSAY OF PROLACTIN: CPT

## 2023-04-27 PROCEDURE — 36415 COLL VENOUS BLD VENIPUNCTURE: CPT

## 2023-04-27 PROCEDURE — 84443 ASSAY THYROID STIM HORMONE: CPT

## 2023-04-27 PROCEDURE — 84403 ASSAY OF TOTAL TESTOSTERONE: CPT

## 2023-04-27 PROCEDURE — 84702 CHORIONIC GONADOTROPIN TEST: CPT

## 2023-04-27 PROCEDURE — 84402 ASSAY OF FREE TESTOSTERONE: CPT

## 2023-04-27 PROCEDURE — 83001 ASSAY OF GONADOTROPIN (FSH): CPT

## 2023-04-27 PROCEDURE — 82627 DEHYDROEPIANDROSTERONE: CPT

## 2023-04-27 PROCEDURE — 85027 COMPLETE CBC AUTOMATED: CPT

## 2023-04-27 PROCEDURE — 80053 COMPREHEN METABOLIC PANEL: CPT

## 2023-04-27 PROCEDURE — 84270 ASSAY OF SEX HORMONE GLOBUL: CPT

## 2023-04-27 PROCEDURE — 83036 HEMOGLOBIN GLYCOSYLATED A1C: CPT

## 2023-04-27 RX ORDER — BREXPIPRAZOLE 2 MG/1
TABLET ORAL
COMMUNITY
Start: 2023-04-18 | End: 2023-11-07

## 2023-04-27 ASSESSMENT — PATIENT HEALTH QUESTIONNAIRE - PHQ9
SUM OF ALL RESPONSES TO PHQ9 QUESTIONS 1 AND 2: 0
1. LITTLE INTEREST OR PLEASURE IN DOING THINGS: NOT AT ALL
9. THOUGHTS THAT YOU WOULD BE BETTER OFF DEAD, OR OF HURTING YOURSELF: NOT AT ALL
7. TROUBLE CONCENTRATING ON THINGS, SUCH AS READING THE NEWSPAPER OR WATCHING TELEVISION: NOT AT ALL
6. FEELING BAD ABOUT YOURSELF - OR THAT YOU ARE A FAILURE OR HAVE LET YOURSELF OR YOUR FAMILY DOWN: NOT AL ALL
SUM OF ALL RESPONSES TO PHQ QUESTIONS 1-9: 0
3. TROUBLE FALLING OR STAYING ASLEEP OR SLEEPING TOO MUCH: NOT AT ALL
5. POOR APPETITE OR OVEREATING: NOT AT ALL
2. FEELING DOWN, DEPRESSED, IRRITABLE, OR HOPELESS: NOT AT ALL
8. MOVING OR SPEAKING SO SLOWLY THAT OTHER PEOPLE COULD HAVE NOTICED. OR THE OPPOSITE, BEING SO FIGETY OR RESTLESS THAT YOU HAVE BEEN MOVING AROUND A LOT MORE THAN USUAL: NOT AT ALL
4. FEELING TIRED OR HAVING LITTLE ENERGY: NOT AT ALL

## 2023-04-27 NOTE — PROGRESS NOTES
"Subjective:     Chief Complaint   Patient presents with    Medication Management     Ozempic    Requesting Labs       HPI:   Naomi presents today with     Obesity  Patient presented at last visit 11/22 for concerns about obesity.  Patient had previous trial of phentermine but noted no relief.  Patient was started on Ozempic at last visit.  Patient has been taking Ozempic for better control of prediabetes, PCOS and obesity.  Presents today for follow-up on medication.  Patient notes that she was not able to get the medication till about 6 weeks ago when her insurance changed.  Has been on the 0.5 mg dose for approximately 2 weeks.  Patient notes that it is working well for her, notes no adverse effects.  Patient has not yet seen progress with weight loss but does note a decrease in her appetite/calorie consumption.  Patient is interested in going up to the 1 mg dose.    Patient requesting labs for hormone testing.  Patient was recently seen by OB/GYN who ordered labs.        ROS:  Gen: no fevers/chills  Pulm: no sob, no cough  CV: no chest pain, no palpitations  GI: no nausea/vomiting, no diarrhea      Objective:     Exam:  /70 (BP Location: Right arm, Patient Position: Sitting, BP Cuff Size: Large adult)   Pulse 87   Temp 36.3 °C (97.3 °F) (Temporal)   Ht 1.6 m (5' 3\")   Wt 100 kg (220 lb 14.4 oz)   LMP  (LMP Unknown)   SpO2 96%   BMI 39.13 kg/m²  Body mass index is 39.13 kg/m².    Gen: Alert and oriented, No apparent distress.  Neck: Neck is supple without lymphadenopathy.  Lungs: Normal effort, CTA bilaterally, no wheezes, rhonchi, or rales  CV: Regular rate and rhythm. No murmurs, rubs, or gallops.  Ext: No clubbing, cyanosis, edema.    Assessment & Plan:     37 y.o. female with the following -     1. Class 2 obesity due to excess calories without serious comorbidity with body mass index (BMI) of 39.0 to 39.9 in adults  2. PCOS (polycystic ovarian syndrome)  3. Prediabetes  Chronic, stable.  " Patient presents today for follow-up on Ozempic.  Patient notes medication is working well for her.  Continue to monitor progress with weight loss and blood sugars.  - Semaglutide, 1 MG/DOSE, 4 MG/3ML Solution Pen-injector; Inject 1 mg under the skin every 7 days.  Dispense: 3 mL; Refill: 3     Return in about 3 months (around 7/27/2023).    Please note that this dictation was created using voice recognition software. I have made every reasonable attempt to correct obvious errors, but I expect that there are errors of grammar and possibly content that I did not discover before finalizing the note.

## 2023-04-30 DIAGNOSIS — E28.2 PCOS (POLYCYSTIC OVARIAN SYNDROME): ICD-10-CM

## 2023-04-30 DIAGNOSIS — E22.1 HYPERPROLACTINEMIA (HCC): ICD-10-CM

## 2023-05-02 ENCOUNTER — OFFICE VISIT (OUTPATIENT)
Dept: ENDOCRINOLOGY | Facility: MEDICAL CENTER | Age: 38
End: 2023-05-02
Attending: INTERNAL MEDICINE
Payer: COMMERCIAL

## 2023-05-02 VITALS
HEIGHT: 63 IN | DIASTOLIC BLOOD PRESSURE: 66 MMHG | SYSTOLIC BLOOD PRESSURE: 120 MMHG | OXYGEN SATURATION: 96 % | BODY MASS INDEX: 39.39 KG/M2 | HEART RATE: 119 BPM | WEIGHT: 222.3 LBS

## 2023-05-02 DIAGNOSIS — E28.2 PCOS (POLYCYSTIC OVARIAN SYNDROME): ICD-10-CM

## 2023-05-02 DIAGNOSIS — E22.1 HYPERPROLACTINEMIA (HCC): ICD-10-CM

## 2023-05-02 DIAGNOSIS — E66.9 OBESITY (BMI 35.0-39.9 WITHOUT COMORBIDITY): ICD-10-CM

## 2023-05-02 PROCEDURE — 99205 OFFICE O/P NEW HI 60 MIN: CPT | Performed by: INTERNAL MEDICINE

## 2023-05-02 PROCEDURE — 99211 OFF/OP EST MAY X REQ PHY/QHP: CPT | Performed by: INTERNAL MEDICINE

## 2023-05-02 RX ORDER — METFORMIN HYDROCHLORIDE 500 MG/1
500 TABLET, EXTENDED RELEASE ORAL 2 TIMES DAILY
Qty: 180 TABLET | Refills: 1 | Status: SHIPPED | OUTPATIENT
Start: 2023-05-02 | End: 2023-07-25

## 2023-05-02 ASSESSMENT — FIBROSIS 4 INDEX: FIB4 SCORE: 0.63

## 2023-05-02 NOTE — PROGRESS NOTES
Chief Complaint: Consult requested by Chichi May P.A.-C. for evaluation of Hyperprolactinemia    HPI:     Naomi Peters is a 37 y.o. female with elevated prolactin of 28 on 4/2023 associated with TSH of 1.0   She has a history of PCOS, Depression, Anxiety and morbid Obesity  She tried and failed metformin (Nausea)  for her PCOS and is taking Inositol.   Her pcp placed her on Ozempic on March 2023 and her dose was recently increased to 1.0mg weekly      She denies galactorrhea.  She denies headaches.  Prolactin levels have not been repeated.  She has not had a pituitary MRI.  She is not taking medications associated with hyperprolactinemia.    Although I am not sure if Effexor can cause elevated prolactin levels     She reports difficulty with weight loss despite being on Ozempic and watching her caloric intake and exercising regularly with yoga.  I discussed conducting a work-up to rule out hypercortisolism        Patient's medications, allergies, and social histories were reviewed and updated as appropriate.      ROS:     CONS:     No fever, no chills, no weight loss, no fatigue   EYES:      No diplopia, no blurry vision, no redness of eyes, no swelling of eyelids   ENT:    No hearing loss, No ear pain, No sore throat, no dysphagia, no neck swelling   CV:     No chest pain, no palpitations, no claudication, no orthopnea, no PND   PULM:    No SOB, no cough, no hemoptysis, no wheezing    GI:   No nausea, no vomiting, no diarrhea, no constipation, no bloody stools   :  Passing urine well, no dysuria, no hematuria   ENDO:   No polyuria, no polydipsia, no heat intolerance, no cold intolerance   NEURO: No headaches, no dizziness, no convulsions, no tremors   MUSC:  No joint swellings, no arthralgias, no myalgias, no weakness   SKIN:   No rash, no ulcers, no dry skin   PSYCH:   No depression, no anxiety, no difficulty sleeping       Past Medical History:  Patient Active Problem List    Diagnosis Date  Noted    Hyperprolactinemia (HCC) 05/02/2023    Pharyngitis due to Streptococcus species 08/02/2021    Attention deficit hyperactivity disorder (ADHD), inattentive type, mild 07/01/2021    Prediabetes 11/02/2020    Concentration deficit 10/02/2020    BRCA gene mutation negative 05/22/2019    Family history of systemic lupus erythematosus 04/15/2019    MDD (major depressive disorder), recurrent, in partial remission (HCC) 04/15/2019    Family history of ovarian cancer 04/15/2019    Family history of breast cancer 04/15/2019    Nonintractable migraine 04/15/2019    Positive TB test 04/15/2019    Obesity (BMI 35.0-39.9 without comorbidity) 06/19/2018    SANDI (generalized anxiety disorder) 02/07/2018    PCOS (polycystic ovarian syndrome) 02/07/2018    Eczema 02/07/2018    Chronic low back pain 02/07/2018       Past Surgical History:  Past Surgical History:   Procedure Laterality Date    OTHER      back surgery        Allergies:  Patient has no known allergies.     Current Medications:    Current Outpatient Medications:     REXULTI 2 MG Tab, , Disp: , Rfl:     Semaglutide, 1 MG/DOSE, 4 MG/3ML Solution Pen-injector, Inject 1 mg under the skin every 7 days., Disp: 3 mL, Rfl: 3    nystatin (MYCOSTATIN) 961730 UNIT/GM Cream topical cream, AAA twice a day for 2 weeks, or until clear then can use as needed, Disp: 30 g, Rfl: 3    clotrimazole (LOTRIMIN) 1 % Cream, Apply 1 Application. topically 2 times a day., Disp: 60 g, Rfl: 0    busPIRone (BUSPAR) 10 MG Tab tablet, 40 mg., Disp: , Rfl:     venlafaxine ER (EFFEXOR) 225 MG TABLET SR 24 HR tablet, Take 1 tablet by mouth every day., Disp: 30 tablet, Rfl: 0    Social History:  Social History     Socioeconomic History    Marital status: Single     Spouse name: Not on file    Number of children: Not on file    Years of education: Not on file    Highest education level: Some college, no degree   Occupational History    Not on file   Tobacco Use    Smoking status: Never    Smokeless  "tobacco: Never   Vaping Use    Vaping Use: Never used   Substance and Sexual Activity    Alcohol use: Yes     Comment: rarely, one drink every 2 months    Drug use: No    Sexual activity: Not Currently     Partners: Male     Birth control/protection: I.U.D.   Other Topics Concern    Not on file   Social History Narrative    Not on file     Social Determinants of Health     Financial Resource Strain: Not on file   Food Insecurity: Not on file   Transportation Needs: Not on file   Physical Activity: Not on file   Stress: Not on file   Social Connections: Not on file   Intimate Partner Violence: Not on file   Housing Stability: Not on file        Family History:   Family History   Problem Relation Age of Onset    Diabetes Mother     Cancer Mother         ovarian    Anxiety disorder Mother     Depression Mother     Alcohol abuse Father     Alcohol abuse Brother     Schizophrenia Brother     Anxiety disorder Brother     Depression Brother     Colon Cancer Maternal Aunt     Cancer Paternal Aunt         breast    Heart Disease Maternal Grandmother     Cancer Paternal Grandmother         pancreatic         PHYSICAL EXAM:   Vital signs: /66   Pulse (!) 119   Ht 1.6 m (5' 3\")   Wt 101 kg (222 lb 4.8 oz)   LMP 04/28/2023 (Exact Date)   SpO2 96%   BMI 39.38 kg/m²   GENERAL: Well-developed, well-nourished  in no apparent distress.   EYE: No ocular and eyelid asymmetry, Anicteric sclerae,  PERRL  HENT: Hearing grossly intact, Normocephalic, atraumatic. Pink, moist mucous membranes, No exudate  NECK: Supple. Trachea midline. thyroid is normal in size without nodules or tenderness  CARDIOVASCULAR: Regular rate and rhythm. No murmurs, rubs, or gallops.   LUNGS: Clear to auscultation bilaterally   ABDOMEN: Soft, nontender with positive bowel sounds.   EXTREMITIES: No clubbing, cyanosis, or edema.   NEUROLOGICAL: Cranial nerves II-XII are grossly intact   Symmetric reflexes at the patella no proximal muscle " weakness  LYMPH: No cervical, supraclavicular,  adenopathy palpated.   SKIN: No rashes, lesions. Turgor is normal.  There is visible acanthosis    Labs:  Lab Results   Component Value Date/Time    WBC 6.2 04/27/2023 07:38 AM    RBC 5.00 04/27/2023 07:38 AM    HEMOGLOBIN 15.9 04/27/2023 07:38 AM    MCV 93.6 04/27/2023 07:38 AM    MCH 31.8 04/27/2023 07:38 AM    MCHC 34.0 04/27/2023 07:38 AM    RDW 41.7 04/27/2023 07:38 AM    MPV 9.9 04/27/2023 07:38 AM       Lab Results   Component Value Date/Time    SODIUM 140 04/27/2023 07:38 AM    POTASSIUM 4.4 04/27/2023 07:38 AM    CHLORIDE 107 04/27/2023 07:38 AM    CO2 22 04/27/2023 07:38 AM    ANION 11.0 04/27/2023 07:38 AM    GLUCOSE 95 04/27/2023 07:38 AM    BUN 10 04/27/2023 07:38 AM    CREATININE 0.61 04/27/2023 07:38 AM    CALCIUM 8.9 04/27/2023 07:38 AM    ASTSGOT 32 04/27/2023 07:38 AM    ALTSGPT 53 (H) 04/27/2023 07:38 AM    TBILIRUBIN 1.0 04/27/2023 07:38 AM    ALBUMIN 4.1 04/27/2023 07:38 AM    TOTPROTEIN 6.9 04/27/2023 07:38 AM    GLOBULIN 2.8 04/27/2023 07:38 AM    AGRATIO 1.5 04/27/2023 07:38 AM       Lab Results   Component Value Date/Time    CHOLSTRLTOT 156 05/25/2022 0809    TRIGLYCERIDE 77 05/25/2022 0809    HDL 46 05/25/2022 0809    LDL 95 05/25/2022 0809       Lab Results   Component Value Date/Time    TSHULTRASEN 1.000 04/27/2023 0738     No results found for: FREET4  No results found for: FREET3  No results found for: THYSTIMIG    No results found for: MICROSOMALA      Imaging:      ASSESSMENT/PLAN:     1. Hyperprolactinemia (HCC)  Her prolactin is mildly high and she does not have symptoms.    I suspect that this is drug related and I am going to check if Effexor can cause mild hyperprolactinemia.  In the meantime I recommend that she repeat a fasting prolactin level and if the levels are still high I am going to schedule her for a pituitary MRI    2. PCOS (polycystic ovarian syndrome)  She is taking inositol for PCOS and going to conduct a research on  the efficacy of this treatment but overall I agree that she should continue taking this medication  I do recommend that she try a different form of metformin called metformin extended release which has less GI side effects and it can help  with weight loss when taken in conjunction with Ozempic.  I am going to measure her testosterone levels and free testosterone levels as well  I am also going to schedule her for a midnight saliva cortisol test to evaluate and rule out Cushing disease which can cause weight gain    3. Obesity (BMI 35.0-39.9 without comorbidity)  Stable I agree with the increase of the Ozempic dose.  She should continue diet and exercise and caloric restriction and reduce caloric intake to less than 1200 kcal a day.  I am going to conduct a work-up to rule out secondary causes of weight gain.  Her thyroid function was normal   I want her to complete a midnight saliva cortisol test to check for Cushing's disease if this is positive we will do more testing      Return in about 4 months (around 9/2/2023).      Total time spent on day of service was over 60 minutes which included obtaining a detailed history and physical exam, ordering labs, coordinating care and scheduling future follow-up    This patient during there office visit was started on new medication.  Side effects of new medications were discussed with the patient today in the office. The patient was supplied paperwork on this new medication.    Thank you kindly for allowing me to participate in the thyroid care plan for this patient.    Guido Keating MD, [facility], [facility]  05/02/23    CC:   Chichi May P.A.-C.

## 2023-05-03 ENCOUNTER — HOSPITAL ENCOUNTER (OUTPATIENT)
Facility: MEDICAL CENTER | Age: 38
End: 2023-05-03
Attending: INTERNAL MEDICINE
Payer: COMMERCIAL

## 2023-05-03 LAB
SHBG SERPL-SCNC: 56 NMOL/L (ref 25–122)
TESTOST FREE SERPL-MCNC: 4.9 PG/ML (ref 1.3–9.2)
TESTOST SERPL-MCNC: 41 NG/DL (ref 9–55)

## 2023-05-03 PROCEDURE — 82533 TOTAL CORTISOL: CPT

## 2023-05-04 ENCOUNTER — HOSPITAL ENCOUNTER (OUTPATIENT)
Facility: MEDICAL CENTER | Age: 38
End: 2023-05-04
Attending: INTERNAL MEDICINE
Payer: COMMERCIAL

## 2023-05-04 PROCEDURE — 82533 TOTAL CORTISOL: CPT

## 2023-05-05 ENCOUNTER — HOSPITAL ENCOUNTER (OUTPATIENT)
Facility: MEDICAL CENTER | Age: 38
End: 2023-05-05
Attending: INTERNAL MEDICINE
Payer: COMMERCIAL

## 2023-05-05 PROCEDURE — 82533 TOTAL CORTISOL: CPT

## 2023-05-12 ENCOUNTER — HOSPITAL ENCOUNTER (OUTPATIENT)
Dept: LAB | Facility: MEDICAL CENTER | Age: 38
End: 2023-05-12
Attending: INTERNAL MEDICINE
Payer: COMMERCIAL

## 2023-05-12 DIAGNOSIS — E66.9 OBESITY (BMI 35.0-39.9 WITHOUT COMORBIDITY): ICD-10-CM

## 2023-05-12 DIAGNOSIS — E22.1 HYPERPROLACTINEMIA (HCC): ICD-10-CM

## 2023-05-12 DIAGNOSIS — E28.2 PCOS (POLYCYSTIC OVARIAN SYNDROME): ICD-10-CM

## 2023-05-12 LAB
ALBUMIN SERPL BCP-MCNC: 3.8 G/DL (ref 3.2–4.9)
ALBUMIN/GLOB SERPL: 1.3 G/DL
ALP SERPL-CCNC: 76 U/L (ref 30–99)
ALT SERPL-CCNC: 53 U/L (ref 2–50)
ANION GAP SERPL CALC-SCNC: 11 MMOL/L (ref 7–16)
AST SERPL-CCNC: 32 U/L (ref 12–45)
BILIRUB SERPL-MCNC: 0.6 MG/DL (ref 0.1–1.5)
BUN SERPL-MCNC: 10 MG/DL (ref 8–22)
CALCIUM ALBUM COR SERPL-MCNC: 8.8 MG/DL (ref 8.5–10.5)
CALCIUM SERPL-MCNC: 8.6 MG/DL (ref 8.5–10.5)
CHLORIDE SERPL-SCNC: 106 MMOL/L (ref 96–112)
CO2 SERPL-SCNC: 24 MMOL/L (ref 20–33)
CREAT SERPL-MCNC: 0.65 MG/DL (ref 0.5–1.4)
FSH SERPL-ACNC: 3.1 MIU/ML
GFR SERPLBLD CREATININE-BSD FMLA CKD-EPI: 115 ML/MIN/1.73 M 2
GLOBULIN SER CALC-MCNC: 3 G/DL (ref 1.9–3.5)
GLUCOSE SERPL-MCNC: 98 MG/DL (ref 65–99)
LH SERPL-ACNC: 5.4 IU/L
POTASSIUM SERPL-SCNC: 4.2 MMOL/L (ref 3.6–5.5)
PROLACTIN SERPL-MCNC: 28.6 NG/ML (ref 2.8–26)
PROT SERPL-MCNC: 6.8 G/DL (ref 6–8.2)
SODIUM SERPL-SCNC: 141 MMOL/L (ref 135–145)

## 2023-05-12 PROCEDURE — 84305 ASSAY OF SOMATOMEDIN: CPT

## 2023-05-12 PROCEDURE — 84403 ASSAY OF TOTAL TESTOSTERONE: CPT

## 2023-05-12 PROCEDURE — 36415 COLL VENOUS BLD VENIPUNCTURE: CPT

## 2023-05-12 PROCEDURE — 84270 ASSAY OF SEX HORMONE GLOBUL: CPT

## 2023-05-12 PROCEDURE — 84146 ASSAY OF PROLACTIN: CPT

## 2023-05-12 PROCEDURE — 84402 ASSAY OF FREE TESTOSTERONE: CPT

## 2023-05-12 PROCEDURE — 83001 ASSAY OF GONADOTROPIN (FSH): CPT

## 2023-05-12 PROCEDURE — 80053 COMPREHEN METABOLIC PANEL: CPT

## 2023-05-12 PROCEDURE — 83002 ASSAY OF GONADOTROPIN (LH): CPT

## 2023-05-14 DIAGNOSIS — E22.1 HYPERPROLACTINEMIA (HCC): ICD-10-CM

## 2023-05-14 PROCEDURE — 1126F AMNT PAIN NOTED NONE PRSNT: CPT | Performed by: INTERNAL MEDICINE

## 2023-05-15 LAB
IGF-I SERPL-MCNC: 172 NG/ML (ref 79–276)
IGF-I Z-SCORE SERPL: 0.4

## 2023-05-16 LAB
CORTIS SAL-MCNC: 0.01 UG/DL
CORTIS SAL-MCNC: 0.03 UG/DL
CORTIS SAL-MCNC: 0.07 UG/DL

## 2023-05-17 LAB
SHBG SERPL-SCNC: 49 NMOL/L (ref 25–122)
TESTOST FREE SERPL-MCNC: 10 PG/ML (ref 1.3–9.2)
TESTOST SERPL-MCNC: 75 NG/DL (ref 9–55)

## 2023-06-03 ENCOUNTER — HOSPITAL ENCOUNTER (OUTPATIENT)
Dept: RADIOLOGY | Facility: MEDICAL CENTER | Age: 38
End: 2023-06-03
Attending: INTERNAL MEDICINE
Payer: COMMERCIAL

## 2023-06-03 DIAGNOSIS — E22.1 HYPERPROLACTINEMIA (HCC): ICD-10-CM

## 2023-06-03 PROCEDURE — 70553 MRI BRAIN STEM W/O & W/DYE: CPT

## 2023-06-03 PROCEDURE — 700117 HCHG RX CONTRAST REV CODE 255: Performed by: INTERNAL MEDICINE

## 2023-06-03 PROCEDURE — A9579 GAD-BASE MR CONTRAST NOS,1ML: HCPCS | Performed by: INTERNAL MEDICINE

## 2023-06-03 RX ADMIN — GADOTERIDOL 20 ML: 279.3 INJECTION, SOLUTION INTRAVENOUS at 13:58

## 2023-07-17 DIAGNOSIS — E28.2 PCOS (POLYCYSTIC OVARIAN SYNDROME): ICD-10-CM

## 2023-07-17 DIAGNOSIS — E66.9 OBESITY (BMI 35.0-39.9 WITHOUT COMORBIDITY): ICD-10-CM

## 2023-07-17 RX ORDER — SEMAGLUTIDE 2.68 MG/ML
2 INJECTION, SOLUTION SUBCUTANEOUS
Qty: 6 ML | Refills: 2 | Status: SHIPPED | OUTPATIENT
Start: 2023-07-17 | End: 2023-09-14 | Stop reason: SDUPTHER

## 2023-07-18 ENCOUNTER — HOSPITAL ENCOUNTER (EMERGENCY)
Facility: MEDICAL CENTER | Age: 38
End: 2023-07-18
Attending: EMERGENCY MEDICINE
Payer: COMMERCIAL

## 2023-07-18 ENCOUNTER — APPOINTMENT (OUTPATIENT)
Dept: RADIOLOGY | Facility: MEDICAL CENTER | Age: 38
End: 2023-07-18
Attending: EMERGENCY MEDICINE
Payer: COMMERCIAL

## 2023-07-18 VITALS
RESPIRATION RATE: 18 BRPM | DIASTOLIC BLOOD PRESSURE: 59 MMHG | WEIGHT: 214.95 LBS | BODY MASS INDEX: 38.09 KG/M2 | SYSTOLIC BLOOD PRESSURE: 104 MMHG | OXYGEN SATURATION: 94 % | HEIGHT: 63 IN | HEART RATE: 88 BPM | TEMPERATURE: 97.6 F

## 2023-07-18 DIAGNOSIS — S39.012A ACUTE MYOFASCIAL STRAIN OF LUMBOSACRAL REGION, INITIAL ENCOUNTER: ICD-10-CM
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
## 2023-07-18 DIAGNOSIS — S89.92XD LEFT KNEE INJURY, SUBSEQUENT ENCOUNTER: ICD-10-CM

## 2023-07-18 PROCEDURE — 99284 EMERGENCY DEPT VISIT MOD MDM: CPT

## 2023-07-18 PROCEDURE — 96374 THER/PROPH/DIAG INJ IV PUSH: CPT

## 2023-07-18 PROCEDURE — 700111 HCHG RX REV CODE 636 W/ 250 OVERRIDE (IP): Mod: JZ | Performed by: EMERGENCY MEDICINE

## 2023-07-18 PROCEDURE — 96375 TX/PRO/DX INJ NEW DRUG ADDON: CPT

## 2023-07-18 PROCEDURE — 72100 X-RAY EXAM L-S SPINE 2/3 VWS: CPT

## 2023-07-18 RX ORDER — HYDROCODONE BITARTRATE AND ACETAMINOPHEN 5; 325 MG/1; MG/1
1 TABLET ORAL EVERY 6 HOURS PRN
Qty: 20 TABLET | Refills: 0 | Status: SHIPPED | OUTPATIENT
Start: 2023-07-18 | End: 2023-07-23

## 2023-07-18 RX ORDER — KETOROLAC TROMETHAMINE 30 MG/ML
30 INJECTION, SOLUTION INTRAMUSCULAR; INTRAVENOUS ONCE
Status: COMPLETED | OUTPATIENT
Start: 2023-07-18 | End: 2023-07-18

## 2023-07-18 RX ORDER — ONDANSETRON 2 MG/ML
4 INJECTION INTRAMUSCULAR; INTRAVENOUS ONCE
Status: COMPLETED | OUTPATIENT
Start: 2023-07-18 | End: 2023-07-18

## 2023-07-18 RX ORDER — KETOROLAC TROMETHAMINE 10 MG/1
10 TABLET, FILM COATED ORAL 3 TIMES DAILY PRN
Qty: 15 TABLET | Refills: 0 | Status: SHIPPED | OUTPATIENT
Start: 2023-07-18 | End: 2023-11-07

## 2023-07-18 RX ORDER — METHOCARBAMOL 750 MG/1
750 TABLET, FILM COATED ORAL 4 TIMES DAILY
Qty: 40 TABLET | Refills: 0 | Status: SHIPPED | OUTPATIENT
Start: 2023-07-18 | End: 2023-11-07

## 2023-07-18 RX ORDER — MORPHINE SULFATE 4 MG/ML
4 INJECTION INTRAVENOUS ONCE
Status: COMPLETED | OUTPATIENT
Start: 2023-07-18 | End: 2023-07-18

## 2023-07-18 RX ADMIN — ONDANSETRON 4 MG: 2 INJECTION INTRAMUSCULAR; INTRAVENOUS at 21:16

## 2023-07-18 RX ADMIN — KETOROLAC TROMETHAMINE 30 MG: 30 INJECTION, SOLUTION INTRAMUSCULAR; INTRAVENOUS at 21:16

## 2023-07-18 RX ADMIN — MORPHINE SULFATE 4 MG: 4 INJECTION, SOLUTION INTRAMUSCULAR; INTRAVENOUS at 21:17

## 2023-07-18 ASSESSMENT — PAIN DESCRIPTION - PAIN TYPE
TYPE: ACUTE PAIN

## 2023-07-18 ASSESSMENT — FIBROSIS 4 INDEX: FIB4 SCORE: 0.63

## 2023-07-18 NOTE — Clinical Note
Naomi Peters was seen and treated in our emergency department on 7/18/2023.  She may return to work on 07/24/2023.       If you have any questions or concerns, please don't hesitate to call.      Birdie Cao D.O.

## 2023-07-18 NOTE — LETTER
[facility], EMERGENCY DEPT   [address], [place] [address]  Phone: Dept: [phone] - Fax:        [facility] Progress Report and Disability Certification  Date of Service: 7/18/2023   No Show:  No  Date / Time of Next Visit: 7/24/2023   Claim Information   Patient Name: Naomi Peters  Claim Number:     Employer: [employer]  Date of Injury: 7/11/2023     Insurer / TPA: Nathaniel ID / SSN: [id number]   Occupation: Teacher Diagnosis: Diagnoses of Fall from ground level, Acute myofascial strain of lumbosacral region, initial encounter, and Left knee injury, subsequent encounter were pertinent to this visit.    Medical Information   Related to Industrial Injury? Yes    Subjective Complaints:  Low back pain and left knee pain after falling in the shower today.  Patient states her left knee is so painful it gave out and she fell injuring her low back.   Objective Findings: Tenderness in the midline lumbar spine as well as the left SI joint and the left knee.  There are no contusions, abrasions or soft tissue swelling.  No gross bony deformities.   Pre-Existing Condition(s): Recent left knee injury causing patient to fall and sustain today's injury.   Assessment:        Status: Additional Care RequiredDischarged / Care Transfer  Permanent Disability:No    Plan: MedicationTransfer Care    Diagnostics: X-ray    Comments:  X-ray of the lumbosacral spine was negative for fracture or subluxation.  Patient was treated with IV morphine, Zofran, Toradol and was markedly improved.  She will be sent home with prescriptions for Toradol, Robaxin, Norco.  She is to follow-up with [facility] orthopedics as scheduled at the end of this month for further evaluation of her knee and possible MRI as I believe she likely has internal disruption.  She is to alternate between ice and moist heat, no weightbearing and she was given crutches to use, no work until next Monday.     Disability Information   Status: Temporarily Totally Disabled    From:  7/18/2023  Through: 7/24/2023 Restrictions are: Temporary   Physical Restrictions   Sitting:  Continuously Standing:  Occasionally Stooping:  Occasionally Bending:      Squatting:  Never Walking:  Occasionally Climbing:  Never Pushing:  Frequently   Pulling:  Frequently Other:    Reaching Above Shoulder (L): Frequently Reaching Above Shoulder (R):       Reaching Below Shoulder (L):    Reaching Below Shoulder (R):      Not to exceed Weight Limits   Carrying(hrs):   Weight Limit(lb): < or = to 10 pounds Lifting(hrs):   Weight  Limit(lb): < or = to 10 pounds   Comments: Patient will need to be on crutches with no weightbearing on her leg till next week then as tolerated per orthopedic surgery.    Repetitive Actions   Hands: i.e. Fine Manipulations from Grasping: < or = to 6 hrs/day   Feet: i.e. Operating Foot Controls: < or = to 6 hrs/day   Driving / Operate Machinery: < or = to 6 hrs/day   Physician Name: Birdie Cao Physician Signature: BIRDIE Reyes D.O. e-Signature:  , Medical Director   Clinic Name / Location: [facility], EMERGENCY DEPT  [address] [place] [id number]  [phone]     Clinic Phone Number: Dept: [phone]   Appointment Time:  Visit Start Time:    Check-In Time:  5:19 PM Visit Discharge Time:    Original-Treating Physician or Chiropractor    Page 2-Insurer/TPA    Page 3-Employer    Page 4-Employee

## 2023-07-19 NOTE — ED NOTES
Provided pt with crutches. Educated pt on use of crutches, patient verbalized understanding of instructions. Encouraged pt to inform staff if any questions arise.

## 2023-07-19 NOTE — DISCHARGE INSTRUCTIONS
Use the crutches with no weightbearing on your left knee for the next 3 to 4 days and give it a rest.  Alternate between ice and moist heat applying warm moist compresses and hot gel packs at least 3-4 times daily.  Take the medications I am prescribing you as directed with food  No work until next Monday.  Follow-up with [facility] as scheduled as you will need to be seen by an orthopedic surgeon and you may very well need an MRI of your knee which will need to be ordered outpatient by them.  Apply topical Biofreeze to your low back and your knee prior to putting the moist heat on it as this will help you sleep and with pain control.

## 2023-07-19 NOTE — ED TRIAGE NOTES
Naomi Peters  37 y.o. female  Chief Complaint   Patient presents with    GLF     Pt states she had a work related injury to her left knee. Pt has been having pain to the knee, and went to relieve some pain by getting in the shower. Pt unfortunately then slipped and fell in the shower and now has back pain as well.        Vitals:    07/18/23 1721   BP: (!) 140/87   Pulse: 97   Resp: 16   Temp: 36.6 °C (97.9 °F)   SpO2: 98%       Patient educated on triage process and encouraged to alert staff of any changes in condition.    Pt ambulatory to triage with an at home knee brace applied to the leg.

## 2023-07-19 NOTE — ED PROVIDER NOTES
ER Provider Note    Scribed for Dr. Birdie Cao D.O. by Salma Hdz. 7/18/2023  8:53 PM    Primary Care Provider: Chichi May P.A.-C.    CHIEF COMPLAINT  Chief Complaint   Patient presents with    GLF     Pt states she had a work related injury to her left knee. Pt has been having pain to the knee, and went to relieve some pain by getting in the shower. Pt unfortunately then slipped and fell in the shower and now has back pain as well.        EXTERNAL RECORDS REVIEWED  Outpatient Notes The patient had an MRI performed secondary to hyperprolactinemia and she was discharged home.    HPI/ROS  LIMITATION TO HISTORY   Select: : None    OUTSIDE HISTORIAN(S):  None.    Naomi Peters is a 37 y.o. female who presents to the ED for evaluation of left knee pain onset one week ago.  She describes that she had a work related injury to her left knee earlier this month. 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 She notes that the bottom of her left leg was up right and the rest of her body was leaned over. The patient notes that she has been to [facility] about 5 times and has had plain x-rays performed and was given a knee brace. She was recommended to go into physical therapy, but the patient is still in pain. She states that Tylenol and ibuprofen are not alleviating her pain. She felt as though her pain was not properly cared for, so she asked for a switch. She was switched to [facility] and her first appointment is on July 31st. She notes that she was in a lot of pain today and had to leave work secondary to the pain. The patient attempted to alleviated her pain by getting in the shower, but unfortunately slipped and fell in the shower. The patient states she also has lower back pain, but denies any left leg pain or fever. The patient is not on crutches, so she notes that she also has begun to develop right hip pain due to her  "trying to not put as much weight on her knee.    PAST MEDICAL HISTORY  Past Medical History:   Diagnosis Date    Anxiety     Depression     PCOS (polycystic ovarian syndrome)      SURGICAL HISTORY  Past Surgical History:   Procedure Laterality Date    OTHER      back surgery     FAMILY HISTORY  Family History   Problem Relation Age of Onset    Diabetes Mother     Cancer Mother         ovarian    Anxiety disorder Mother     Depression Mother     Alcohol abuse Father     Alcohol abuse Brother     Schizophrenia Brother     Anxiety disorder Brother     Depression Brother     Colon Cancer Maternal Aunt     Cancer Paternal Aunt         breast    Heart Disease Maternal Grandmother     Cancer Paternal Grandmother         pancreatic     SOCIAL HISTORY   reports that she has never smoked. She has never used smokeless tobacco. She reports current alcohol use. She reports that she does not use drugs.    CURRENT MEDICATIONS  Previous Medications    BUSPIRONE (BUSPAR) 10 MG TAB TABLET    40 mg.    CLOTRIMAZOLE (LOTRIMIN) 1 % CREAM    Apply 1 Application. topically 2 times a day.    METFORMIN ER (GLUCOPHAGE XR) 500 MG TABLET SR 24 HR    Take 1 Tablet by mouth 2 times a day.    NYSTATIN (MYCOSTATIN) 044388 UNIT/GM CREAM TOPICAL CREAM    AAA twice a day for 2 weeks, or until clear then can use as needed    REXULTI 2 MG TAB        SEMAGLUTIDE, 2 MG/DOSE, (OZEMPIC, 2 MG/DOSE,) 8 MG/3ML SOLUTION PEN-INJECTOR    Inject 2 mg under the skin every 7 days.    VENLAFAXINE ER (EFFEXOR) 225 MG TABLET SR 24 HR TABLET    Take 1 tablet by mouth every day.     ALLERGIES  Patient has no known allergies.    PHYSICAL EXAM  /86   Pulse 75   Temp 36.8 °C (98.2 °F) (Temporal)   Resp 18   Ht 1.6 m (5' 3\")   Wt 97.5 kg (214 lb 15.2 oz)   SpO2 97%   BMI 38.08 kg/m²   Constitutional: Patient is well developed, well nourished.  Moderate distress from her injuries.  HENT: Normocephalic, atraumatic.  Moist oral mucosa.  Cardiovascular: Normal " heart rate and Regular rhythm. No murmur.  Thorax & Lungs: Clear and equal breath sounds with good excursion. No respiratory distress  Abdomen: Bowel sounds normal in all four quadrants. Soft,nontender, no rebound , guarding, palpable masses.   Skin: Warm, Dry, No contusions or abrasions.  Back: .Tenderness midline from L4-S1 with extension into her left SI joint.  Extremities: Peripheral pulses 4/4 No edema. Left patellofemoral tenderness.    Neurologic: Alert & oriented x 3, Normal motor function, Normal sensory function.  Psychiatric: Affect anxious.    DIAGNOSTIC STUDIES & PROCEDURES    Radiology:   The attending Emergency Physician has independently interpreted the diagnostic imaging associated with this visit and is awaiting the final reading from the radiologist, which will be displayed below.    Preliminary interpretation is a follows: No acute fracture or subluxation.  Radiologist interpretation:    DX-LUMBAR SPINE-2 OR 3 VIEWS   Final Result         1.  No acute traumatic bony injury of the lumbar spine.         COURSE & MEDICAL DECISION MAKING    ED Observation Status? Yes; I am placing the patient in to an observation status due to a diagnostic uncertainty as well as therapeutic intensity. Patient placed in observation status at 9:00 PM, 7/18/2023.     Observation plan is as follows: Will perform imaging for further evaluation of her symptoms.     Upon Reevaluation, the patient's condition has: Improved; and will be discharged.    Patient discharged from ED Observation status at 11:18 PM (Time) 7/18/2023 (Date).     INITIAL ASSESSMENT AND PLAN  Care Narrative:       8:53 PM - Patient seen and evaluated at bedside. . I informed the patient that I will not be able to perform an MRI of her left knee, but I recommended she continue the workers comp process. Discussed plan of care, including performing imaging of her back. Patient agrees to plan of care. Patient will be treated with Zofran 4 mg, morphine 4  mg, and Toradol 30 mg for her symptoms. Ordered DX-Lumbar Spine 2 or 3 to evaluate. Differential diagnoses include but are not limited to: Fracture vs. Sprain.     11:18 PM - Patient was reevaluated at bedside. Discussed radiology results with the patient and informed them that they are reassuring. I will send the patient home with pain medication for at home pain management. I also informed the patient that I will be placing her on crutches. I recommended that she switch between treating her left knee with ice and heat. Discussed discharge instructions and return precautions with the patient and they were cleared for discharge. Patient was given the opportunity to ask any further questions. She is comfortable with discharge at this time.         ADDITIONAL PROBLEM LIST AND DISPOSITION  Anxiety, depression               DISPOSITION AND DISCUSSIONS  I have discussed management of the patient with the following physicians and JERMAN's: None.    Discussion of management with other QHP or appropriate source(s): None .    Barriers to care at this time, including but not limited to:  None known .     Decision tools and prescription drugs considered including, but not limited to:  Toradol, Robaxin, Norco, topical Biofreeze, alternate between ice and moist heat, follow-up with her Worker's Comp. as scheduled. .    I reviewed prescription monitoring program for patient's narcotic use before prescribing a scheduled drug.The patient will not drink alcohol nor drive with prescribed medications. The patient will return for new or worsening symptoms and is stable at the time of discharge.    In prescribing controlled substances to this patient, I certify that I have obtained and reviewed the medical history of Naomi Peters. I have also made a good robyn effort to obtain applicable records from other providers who have treated the patient and records did not demonstrate any increased risk of substance abuse that would  prevent me from prescribing controlled substances.     I have conducted a physical exam and documented it. I have reviewed Ms. Peters’s prescription history as maintained by the [place] Prescription Monitoring Program.     I have assessed the patient’s risk for abuse, dependency, and addiction using the validated Opioid Risk Tool available at https://www.mdcalc.com/oopdjg-xywr-jpfn-ort-narcotic-abuse.     Given the above, I believe the benefits of controlled substance therapy outweigh the risks. The reasons for prescribing controlled substances include non-narcotic, oral analgesic alternatives have been inadequate for pain control. Accordingly, I have discussed the risk and benefits, treatment plan, and alternative therapies with the patient.      DISPOSITION:  Patient will be discharged home in stable condition.    FOLLOW UP:  [id number]  [place] [address]  [phone]        OUTPATIENT MEDICATIONS:  New Prescriptions    HYDROCODONE-ACETAMINOPHEN (NORCO) 5-325 MG TAB PER TABLET    Take 1 Tablet by mouth every 6 hours as needed (As needed for severe pain) for up to 5 days. Take with food and stool softeners    KETOROLAC (TORADOL) 10 MG TAB    Take 1 Tablet by mouth 3 times a day as needed for Moderate Pain. With food    METHOCARBAMOL (ROBAXIN) 750 MG TAB    Take 1 Tablet by mouth 4 times a day. As needed for muscle spasm/strain      FINAL IMPRESSION   1. Fall from ground level    2. Acute myofascial strain of lumbosacral region, initial encounter    3. Left knee injury, subsequent encounter       ISalma (Bhavnaibmonty), am scribing for, and in the presence of, Birdie Cao D.O..    Electronically signed by: Salma Hdz (Bhavnaibe), 7/18/2023    I, Birdie Cao D.O. personally performed the services described in this documentation, as scribed by Salma Hdz in my presence, and it is both accurate and complete.    The note accurately  reflects work and decisions made by me.  Birdie Cao D.O.  7/19/2023  4:31 AM

## 2023-07-25 ENCOUNTER — APPOINTMENT (OUTPATIENT)
Dept: MEDICAL GROUP | Facility: MEDICAL CENTER | Age: 38
End: 2023-07-25
Payer: COMMERCIAL

## 2023-07-25 ENCOUNTER — OCCUPATIONAL MEDICINE (OUTPATIENT)
Dept: URGENT CARE | Facility: CLINIC | Age: 38
End: 2023-07-25
Payer: COMMERCIAL

## 2023-07-25 VITALS
SYSTOLIC BLOOD PRESSURE: 110 MMHG | BODY MASS INDEX: 36.48 KG/M2 | DIASTOLIC BLOOD PRESSURE: 74 MMHG | RESPIRATION RATE: 22 BRPM | OXYGEN SATURATION: 100 % | HEART RATE: 81 BPM | TEMPERATURE: 97.8 F | HEIGHT: 64 IN | WEIGHT: 213.7 LBS

## 2023-07-25 DIAGNOSIS — S86.912D KNEE STRAIN, LEFT, SUBSEQUENT ENCOUNTER: ICD-10-CM

## 2023-07-25 PROCEDURE — 3078F DIAST BP <80 MM HG: CPT | Performed by: NURSE PRACTITIONER

## 2023-07-25 PROCEDURE — 99213 OFFICE O/P EST LOW 20 MIN: CPT | Performed by: NURSE PRACTITIONER

## 2023-07-25 PROCEDURE — 3074F SYST BP LT 130 MM HG: CPT | Performed by: NURSE PRACTITIONER

## 2023-07-25 ASSESSMENT — FIBROSIS 4 INDEX: FIB4 SCORE: 0.63

## 2023-07-25 NOTE — PROGRESS NOTES
Subjective:     CC: No chief complaint on file.      HPI:   Naomi is a 37 y.o. female who presents today for:     ***    Allergies: Patient has no known allergies.     Medications:   Current Outpatient Medications:     ketorolac (TORADOL) 10 MG Tab, Take 1 Tablet by mouth 3 times a day as needed for Moderate Pain. With food, Disp: 15 Tablet, Rfl: 0    methocarbamol (ROBAXIN) 750 MG Tab, Take 1 Tablet by mouth 4 times a day. As needed for muscle spasm/strain, Disp: 40 Tablet, Rfl: 0    Semaglutide, 2 MG/DOSE, (OZEMPIC, 2 MG/DOSE,) 8 MG/3ML Solution Pen-injector, Inject 2 mg under the skin every 7 days., Disp: 6 mL, Rfl: 2    metFORMIN ER (GLUCOPHAGE XR) 500 MG TABLET SR 24 HR, Take 1 Tablet by mouth 2 times a day., Disp: 180 Tablet, Rfl: 1    REXULTI 2 MG Tab, , Disp: , Rfl:     nystatin (MYCOSTATIN) 008576 UNIT/GM Cream topical cream, AAA twice a day for 2 weeks, or until clear then can use as needed, Disp: 30 g, Rfl: 3    clotrimazole (LOTRIMIN) 1 % Cream, Apply 1 Application. topically 2 times a day., Disp: 60 g, Rfl: 0    busPIRone (BUSPAR) 10 MG Tab tablet, 40 mg., Disp: , Rfl:     venlafaxine ER (EFFEXOR) 225 MG TABLET SR 24 HR tablet, Take 1 tablet by mouth every day., Disp: 30 tablet, Rfl: 0      ROS:  ROS***    Objective:     Exam:  There were no vitals taken for this visit. There is no height or weight on file to calculate BMI.    Physical Exam      Assessment & Plan:     Naomi a 37 y.o. female with the following -     There are no diagnoses linked to this encounter.      Anticipatory guidance included the following: Patient counseled about skin care, diet, supplements, smoking, drugs/alcohol use, safe sex and exercise.     No follow-ups on file.    Please note that this dictation was created using voice recognition software. I have made every reasonable attempt to correct obvious errors, but I expect that there are errors of grammar and possibly content that I did not discover before finalizing the  note.

## 2023-07-25 NOTE — PROGRESS NOTES
"Subjective:   Naomi Peters  is a 37 y.o. female who presents for Follow-Up ( [facility] DOI 7-11-23 left knee)    DOI: 7/11/23: Patient returns to the urgent care after she was evaluated 7/18/2023 in the emergency room for reinjury of the left knee after she slipped and fell in the shower also injuring her low back.  Patient has been seen with Ean and Chinmay.  Is scheduled to follow-up with Chinmay Monday of next week.  Patient was advised to follow-up here at the urgent care after she was evaluated in the emergency room.  Continues to have persistent pain in the left knee takes muscle relaxants as needed however does make her sleepy so she only takes in the evening.  Does use crutches intermittently is not using them to date today.   When she slipped and fell in the shower she did injure her back.Denies numbness and tingling lower extremities.       HPI  Review of Systems   Musculoskeletal:  Positive for joint pain.     No Known Allergies   Objective:   /74   Pulse 81   Temp 36.6 °C (97.8 °F) (Temporal)   Resp (!) 22   Ht 1.621 m (5' 3.82\")   Wt 96.9 kg (213 lb 11.2 oz)   LMP 07/08/2023   SpO2 100%   BMI 36.89 kg/m²   Physical Exam  Constitutional:       Appearance: Normal appearance. She is not ill-appearing or toxic-appearing.   HENT:      Head: Normocephalic.      Right Ear: External ear normal.      Left Ear: External ear normal.      Nose: Nose normal.      Mouth/Throat:      Lips: Pink.   Eyes:      General: Lids are normal.   Pulmonary:      Effort: Pulmonary effort is normal. No accessory muscle usage.   Musculoskeletal:      Cervical back: Full passive range of motion without pain.      Left knee: No swelling or bony tenderness. Decreased range of motion. Tenderness present over the medial joint line.   Neurological:      Mental Status: She is alert and oriented to person, place, and time.   Psychiatric:         Mood and Affect: Mood normal.         Thought Content: Thought content " normal.       Left knee: No gross deformities, tenderness palpation to the medial aspect, no effusion, DROM due to pain, gait antalgic.   Assessment/Plan:   1. Knee strain, left, subsequent encounter  Encourage patient to continue resting, icing, Tyle Motrin as needed for pain, continue with muscle relaxants as needed advised not to take while working and/or driving.  Patient will continue using knee brace and crutches.  We will transfer care to [facility] as she has a scheduled appointment next week advised to follow-up as scheduled.  Continue her temporary work restrictions.  Differential diagnosis, natural history, supportive care, and indications for immediate follow-up discussed.

## 2023-07-30 ENCOUNTER — APPOINTMENT (OUTPATIENT)
Dept: RADIOLOGY | Facility: MEDICAL CENTER | Age: 38
End: 2023-07-30
Attending: EMERGENCY MEDICINE
Payer: COMMERCIAL

## 2023-07-30 ENCOUNTER — HOSPITAL ENCOUNTER (EMERGENCY)
Facility: MEDICAL CENTER | Age: 38
End: 2023-07-30
Attending: EMERGENCY MEDICINE
Payer: COMMERCIAL

## 2023-07-30 VITALS
RESPIRATION RATE: 18 BRPM | HEIGHT: 63 IN | OXYGEN SATURATION: 95 % | WEIGHT: 213 LBS | DIASTOLIC BLOOD PRESSURE: 56 MMHG | HEART RATE: 92 BPM | BODY MASS INDEX: 37.74 KG/M2 | TEMPERATURE: 97.8 F | SYSTOLIC BLOOD PRESSURE: 107 MMHG

## 2023-07-30 DIAGNOSIS — M25.562 ACUTE PAIN OF LEFT KNEE: ICD-10-CM

## 2023-07-30 PROCEDURE — A9270 NON-COVERED ITEM OR SERVICE: HCPCS | Performed by: EMERGENCY MEDICINE

## 2023-07-30 PROCEDURE — 99284 EMERGENCY DEPT VISIT MOD MDM: CPT

## 2023-07-30 PROCEDURE — 73562 X-RAY EXAM OF KNEE 3: CPT | Mod: LT

## 2023-07-30 PROCEDURE — 700102 HCHG RX REV CODE 250 W/ 637 OVERRIDE(OP): Performed by: EMERGENCY MEDICINE

## 2023-07-30 RX ORDER — OXYCODONE AND ACETAMINOPHEN 10; 325 MG/1; MG/1
1 TABLET ORAL ONCE
Status: COMPLETED | OUTPATIENT
Start: 2023-07-30 | End: 2023-07-30

## 2023-07-30 RX ADMIN — OXYCODONE AND ACETAMINOPHEN 1 TABLET: 10; 325 TABLET ORAL at 22:04

## 2023-07-30 ASSESSMENT — PAIN DESCRIPTION - PAIN TYPE
TYPE: ACUTE PAIN
TYPE: ACUTE PAIN

## 2023-07-30 ASSESSMENT — FIBROSIS 4 INDEX: FIB4 SCORE: 0.63

## 2023-07-30 NOTE — LETTER
[facility], EMERGENCY DEPT   [address] [place] [address]  Phone: Dept: [phone] - Fax:        [facility] Progress Report and Disability Certification  Date of Service: 7/30/2023   No Show:  No  Date / Time of Next Visit: 7/26/2023   Claim Information   Patient Name: Naomi Peters  Claim Number:     Employer: [employer]  Date of Injury: 7/11/2023     Insurer / TPA: Nathaniel Insurance ID / SSN: [id number]    Occupation: Teacher Diagnosis: The encounter diagnosis was Acute pain of left knee.    Medical Information   Related to Industrial Injury?   Comments:Original injury was ***   Subjective Complaints:  Acute on chronic left knee pain   Objective Findings: Left knee tenderness   Pre-Existing Condition(s): Injured knee on the 17th of this month   Assessment:   Condition Worsened    Status: Additional Care Required  Permanent Disability:  Comments:Unknown    Plan:   Comments:Refer patient to orthopedic surgeon    Diagnostics: X-ray    Comments:  No obvious x-ray findings to explain acute pain.    Disability Information   Status: Temporarily Totally Disabled    From:  7/30/2023  Through: 7/26/2023 Restrictions are: Temporary   Physical Restrictions   Sitting:  Frequently Standing:  Occasionally Stooping:  Rarely Bending:      Squatting:  Never Walking:  Occasionally Climbing:  Never Pushing:  Never   Pulling:  Never Other:    Reaching Above Shoulder (L): Rarely Reaching Above Shoulder (R):       Reaching Below Shoulder (L):    Reaching Below Shoulder (R):      Not to exceed Weight Limits   Carrying(hrs):   Weight Limit(lb):   Lifting(hrs):   Weight  Limit(lb):     Comments: Patient will be given the first 3 days of the week off so that she can follow-up at the [place] orth[facility] clinic as she now needs expert consultation due to repeat injuries of the same knee and difficulty weightbearing.    Repetitive Actions   Hands: i.e. Fine  Manipulations from Grasping:     Feet: i.e. Operating Foot Controls:     Driving / Operate Machinery:     Physician Name: Randi Jessica Physician Signature: [email or web address]-[email or web address] Edward M.D. e-Signature:  , Medical Director   Clinic Name / Location: [facility], EMERGENCY DEPT  [address]  [place] [address]  [phone]     Clinic Phone Number: Dept: [phone]   Appointment Time:  Visit Start Time:    Check-In Time:  9:09 PM Visit Discharge Time:    Original-Treating Physician or Chiropractor    Page 2-Insurer/TPA    Page 3-Employer    Page 4-Employee

## 2023-07-30 NOTE — LETTER
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    Medical Information   Related to Industrial Injury?   Comments:Original injury was ***   Subjective Complaints:  Acute on chronic left knee pain   Objective Findings: Left knee tenderness   Pre-Existing Condition(s): Injured knee on the 17th of this month   Assessment:   Condition Worsened    Status: Additional Care Required  Permanent Disability:  Comments:Unknown    Plan:   Comments:Refer patient to orthopedic surgeon    Diagnostics: X-ray    Comments:  No obvious x-ray findings to explain acute pain.    Disability Information   Status: Temporarily Totally Disabled    From:  7/30/2023  Through: 7/26/2023 Restrictions are: Temporary   Physical Restrictions   Sitting:    Standing:    Stooping:    Bending:      Squatting:    Walking:    Climbing:    Pushing:      Pulling:    Other:    Reaching Above Shoulder (L):   Reaching Above Shoulder (R):       Reaching Below Shoulder (L):    Reaching Below Shoulder (R):      Not to exceed Weight Limits   Carrying(hrs):   Weight Limit(lb):   Lifting(hrs):   Weight  Limit(lb):     Comments: Patient will be given the first 3 days of the week off so that she can follow-up at the [place] orthopedic clinic as she now needs expert consultation due to repeat injuries of the same knee and difficulty weightbearing.    Repetitive Actions   Hands: i.e. Fine Manipulations from Grasping:     Feet: i.e. Operating Foot  Controls:     Driving / Operate Machinery:     Physician Name: Randi Jessica Physician Signature: RANDI Cunningham M.D. e-Signature:  , Medical Director   Clinic Name / Location: [facility], EMERGENCY DEPT  [address]  [place] [address]  [phone]     Clinic Phone Number: Dept: [phone]   Appointment Time:  Visit Start Time:    Check-In Time:  9:09 PM Visit Discharge Time:    Original-Treating Physician or Chiropractor    Page 2-Insurer/TPA    Page 3-Employer    Page 4-Employee

## 2023-07-31 ENCOUNTER — OFFICE VISIT (OUTPATIENT)
Dept: DERMATOLOGY | Facility: IMAGING CENTER | Age: 38
End: 2023-07-31
Payer: COMMERCIAL

## 2023-07-31 DIAGNOSIS — L73.8 SEBACEOUS HYPERPLASIA: ICD-10-CM

## 2023-07-31 DIAGNOSIS — L21.9 SEBORRHEA: ICD-10-CM

## 2023-07-31 PROCEDURE — 99213 OFFICE O/P EST LOW 20 MIN: CPT | Performed by: NURSE PRACTITIONER

## 2023-07-31 RX ORDER — KETOCONAZOLE 20 MG/ML
SHAMPOO TOPICAL
Qty: 120 ML | Refills: 3 | Status: SHIPPED | OUTPATIENT
Start: 2023-07-31 | End: 2024-03-05

## 2023-07-31 NOTE — PROGRESS NOTES
DERMATOLOGY NOTE  FOLLOW UP VISIT       Chief complaint: Follow-Up (Scalp issues)     HPI:itchy scalp, white flakes, burning  Onset: 1 mth  Treatments: diff shampoos, oils and conditioner       No Known Allergies     MEDICATIONS:  Medications relevant to specialty reviewed.     REVIEW OF SYSTEMS:   Positive for skin (see HPI)  Negative for fevers and chills       EXAM:  LMP 07/07/2023 (Exact Date)   Constitutional: Well-developed, well-nourished, and in no distress.     A focused skin exam was performed including the affected areas of the head (including face). Notable findings on exam today listed below and/or in assessment/plan.     Seborrhea throughout scalp, no evidence of rash or irritation. Oily scalp  Several scattered yellowish/red papules with telangiectasias and central dell on face      IMPRESSION / PLAN:    1. Seborrhea  Discussed course/nature of disease  Discussed OTC supportive measures  Rx below  Follow up for no improvment  - ketoconazole (NIZORAL) 2 % shampoo; Use daily, or every wash for next 2-3 weeks, then use 2-3 times per week as needed  Dispense: 120 mL; Refill: 3    2. Sebaceous hyperplasia  - Benign-appearing nature of lesions discussed during exam.   - discussed tx option, cosmetic  - advised can try OTC retinols  - Advised to continue to monitor for any return to clinic for new or concerning changes.      Patient verbalized understanding and agrees with plan regarding the above        Please note that this dictation was created using voice recognition software. I have made every reasonable attempt to correct obvious errors, but I expect that there are errors of grammar and possibly content that I did not discover before finalizing the note.      Return to clinic in: Return for PRN no improvement. and as needed for any new or changing skin lesions.

## 2023-07-31 NOTE — ED TRIAGE NOTES
"Chief Complaint   Patient presents with    Fall    Knee Pain     Pt A0x4, on crutches, left knee injured 7/11 at work on workcomps. Today pt, loss balance and fell at home around  3pm. Injuring again the left knee. Took immediately norco and toradol with no relief. Denies head back and arms injury and LOC.       BP (!) 141/87   Pulse 98   Temp 36.5 °C (97.7 °F) (Temporal)   Resp 18   Ht 1.6 m (5' 3\")   Wt 96.6 kg (213 lb)   LMP 07/07/2023 (Exact Date)   SpO2 96%   BMI 37.73 kg/m²     "

## 2023-07-31 NOTE — ED NOTES
Discharged in stable condition, alert and oriented, ambulatory.Follow up appointment instructed. Health education imparted. Instructed to come back once symptoms worsened. Pt verbalized understanding of the information given.

## 2023-07-31 NOTE — ED PROVIDER NOTES
"ED Provider Note    CHIEF COMPLAINT  Chief Complaint   Patient presents with    Fall    Knee Pain     Pt A0x4, on crutches, left knee injured 7/11 at work on workcomps. Today pt, loss balance and fell at home around  3pm. Injuring again the left knee. Took immediately norco and toradol with no relief. Denies head back and arms injury and LOC.         HPI  Naomi Peters is a 37 y.o. female who presents for evaluation of left knee pain.  This is acute on chronic and the patient states that she was walking today and reinjured her knee.  She states that her left knee \"gave out\" and she fell to the ground striking her patella on the ground and then falling backwards hyperflexing the knee itself.  She states the pain was severe despite her medication regimen at home and icing with elevation.  She notes no swelling and no bruising.  She has no numbness or tingling distal to the knee.  EXTERNAL RECORDS REVIEWED  Reviewed ED visit on the 18th of this month after initial injury and visit to urgent care/occupational medicine on the 25th of this month.  ROS  Constitutional: No fevers or chills  Skin: No bruising or redness  Musculoskeletal: Pain, increased with range of motion, and difficulty with weightbearing left knee.  Neurologic: No sensory or focal motor changes to affected extremity   Heme: No bleeding or bruising problems.   Immuno: No hx of recurrent infections        LIMITATION TO HISTORY   None none  OUTSIDE HISTORIAN(S):  none        PAST FAM HISTORY  Family History   Problem Relation Age of Onset    Diabetes Mother     Cancer Mother         ovarian    Anxiety disorder Mother     Depression Mother     Alcohol abuse Father     Alcohol abuse Brother     Schizophrenia Brother     Anxiety disorder Brother     Depression Brother     Colon Cancer Maternal Aunt     Cancer Paternal Aunt         breast    Heart Disease Maternal Grandmother     Cancer Paternal Grandmother         pancreatic       PAST MEDICAL " "HISTORY   has a past medical history of Anxiety, Depression, and PCOS (polycystic ovarian syndrome).    SOCIAL HISTORY  Social History     Tobacco Use    Smoking status: Never    Smokeless tobacco: Never   Vaping Use    Vaping Use: Never used   Substance and Sexual Activity    Alcohol use: Yes     Comment: rarely, one drink every 2 months, social    Drug use: No    Sexual activity: Not Currently     Partners: Male     Birth control/protection: I.U.D.       SURGICAL HISTORY   has a past surgical history that includes other.    CURRENT MEDICATIONS  Home Medications       Reviewed by Trini Valiente R.N. (Registered Nurse) on 07/30/23 at 2138  Med List Status: Partial     Medication Last Dose Status   busPIRone (BUSPAR) 10 MG Tab tablet  Active   clotrimazole (LOTRIMIN) 1 % Cream  Active   ketorolac (TORADOL) 10 MG Tab  Active   methocarbamol (ROBAXIN) 750 MG Tab  Active   REXULTI 2 MG Tab  Active   Semaglutide, 2 MG/DOSE, (OZEMPIC, 2 MG/DOSE,) 8 MG/3ML Solution Pen-injector  Active   venlafaxine ER (EFFEXOR) 225 MG TABLET SR 24 HR tablet  Active                     ALLERGIES  No Known Allergies    PHYSICAL EXAM  VITAL SIGNS: BP (!) 141/87   Pulse 98   Temp 36.5 °C (97.7 °F) (Temporal)   Resp 18   Ht 1.6 m (5' 3\")   Wt 96.6 kg (213 lb)   LMP 07/07/2023 (Exact Date)   SpO2 96%   BMI 37.73 kg/m²    Gen: Alert in no apparent distress.  HEENT: No signs of trauma, Bilateral external ears normal, Nose normal. Conjunctiva normal, Non-icteric.   Cardiovascular: Regular rate and rhythm, no murmurs.  Capillary refill less than 3 seconds to affected extremity, 2+ dorsalis pedis and posterior tibial to affected extremity  Skin: Warm, Dry, No erythema, no abrasions or ecchymosis noted to the affected extremity  Extremities: Intact distal pulses, No edema.  Patient cannot fully extend her left lower extremity due to pain around the patellar and the lateral/medial portions of the knee.  There is no obvious effusion " and no edema.  She is able to flex the knee to approximately 90 degrees at the most but has to stop due to pain.  Neurologic: Sensation intact to light touch to affected extremity  Psychiatric: Affect pleasant    INITIAL IMPRESSION  Patient arrives after reinjuring her already injured knee.  We will get plain films of the knee and to reevaluate the patient.  Likely we will need to revise her duty status and have her follow-up with the orthopedic surgeon as I feel she may have an internal derangement from her issue today.    RADIOLOGY  DX-KNEE 3 VIEWS LEFT   Final Result      No evidence of acute fracture or dislocation.        I have independently interpreted the diagnostic imaging associated with this visit and am waiting the final reading from the radiologist.   My preliminary interpretation is a follows: 3 view left knee: No fracture or dislocation noted.        COURSE & MEDICAL DECISION MAKING  Pertinent Labs & Imaging studies reviewed. (See chart for details)  ED observation? No  Patient's injuries do not appear to involve fractures or dislocations but her acute on chronic pain is becoming somewhat debilitating for her.  She is to the point where I feel she will benefit from orthopedic surgery consultation urgently.  I did not feel this needed to happen in the ER and I felt she could safely go to the [place] [facility] walk[facility] to be evaluated.  She will be placed off work for 3 days so she can attend to this and concentrate on symptomatic treatment at home.  She will follow-up with occupational health regardless.  I did consider CT imaging to evaluate for occult fracture but I feel this is very unlikely given the mechanism.  Patient states understanding that if her symptoms worsen or change she needed to return for reevaluation otherwise make arrangements to follow-up with [facility] as soon as possible or go to their walk-in clinic as soon as possible.    I have discussed management of the  patient with the following physicians and JERMAN's: None    Escalation of care considered, and ultimately not performed: CT imaging    Barriers to care at this time, including but not limited to: None.     Decision tools and Rx drugs considered including, but not limited to : None    Discussion of management with other QHP or appropriate source(s): None     The patient will return for worsening symptoms and is stable at the time of discharge. The patient verbalizes understanding and will comply.    FINAL IMPRESSION  1. Acute pain of left knee        Electronically signed by: Xavier Jessica M.D., 7/30/2023 9:53 PM

## 2023-08-21 ENCOUNTER — OFFICE VISIT (OUTPATIENT)
Dept: URGENT CARE | Facility: CLINIC | Age: 38
End: 2023-08-21
Payer: COMMERCIAL

## 2023-08-21 VITALS
WEIGHT: 211 LBS | RESPIRATION RATE: 16 BRPM | BODY MASS INDEX: 37.39 KG/M2 | DIASTOLIC BLOOD PRESSURE: 70 MMHG | HEART RATE: 74 BPM | SYSTOLIC BLOOD PRESSURE: 110 MMHG | OXYGEN SATURATION: 100 % | TEMPERATURE: 97 F | HEIGHT: 63 IN

## 2023-08-21 DIAGNOSIS — R10.13 EPIGASTRIC PAIN: ICD-10-CM

## 2023-08-21 DIAGNOSIS — R11.2 NAUSEA AND VOMITING, UNSPECIFIED VOMITING TYPE: ICD-10-CM

## 2023-08-21 DIAGNOSIS — R10.13 DYSPEPSIA: ICD-10-CM

## 2023-08-21 LAB
APPEARANCE UR: NORMAL
BILIRUB UR STRIP-MCNC: NORMAL MG/DL
COLOR UR AUTO: NORMAL
GLUCOSE BLD-MCNC: 140 MG/DL (ref 65–99)
GLUCOSE UR STRIP.AUTO-MCNC: 100 MG/DL
KETONES UR STRIP.AUTO-MCNC: 40 MG/DL
LEUKOCYTE ESTERASE UR QL STRIP.AUTO: NORMAL
NITRITE UR QL STRIP.AUTO: NEGATIVE
PH UR STRIP.AUTO: 7.5 [PH] (ref 5–8)
POCT INT CON NEG: NEGATIVE
POCT INT CON POS: POSITIVE
POCT URINE PREGNANCY TEST: NEGATIVE
PROT UR QL STRIP: 30 MG/DL
RBC UR QL AUTO: NORMAL
SP GR UR STRIP.AUTO: 1.02
UROBILINOGEN UR STRIP-MCNC: >=8 MG/DL

## 2023-08-21 PROCEDURE — 3074F SYST BP LT 130 MM HG: CPT

## 2023-08-21 PROCEDURE — 82962 GLUCOSE BLOOD TEST: CPT

## 2023-08-21 PROCEDURE — 81025 URINE PREGNANCY TEST: CPT

## 2023-08-21 PROCEDURE — 81002 URINALYSIS NONAUTO W/O SCOPE: CPT

## 2023-08-21 PROCEDURE — 99213 OFFICE O/P EST LOW 20 MIN: CPT

## 2023-08-21 PROCEDURE — 3078F DIAST BP <80 MM HG: CPT

## 2023-08-21 RX ORDER — PANTOPRAZOLE SODIUM 20 MG/1
20 TABLET, DELAYED RELEASE ORAL DAILY
Qty: 30 TABLET | Refills: 0 | Status: SHIPPED | OUTPATIENT
Start: 2023-08-21 | End: 2024-01-03

## 2023-08-21 RX ORDER — ONDANSETRON 4 MG/1
4 TABLET, FILM COATED ORAL EVERY 4 HOURS PRN
Qty: 20 TABLET | Refills: 0 | Status: SHIPPED | OUTPATIENT
Start: 2023-08-21 | End: 2023-08-28

## 2023-08-21 ASSESSMENT — FIBROSIS 4 INDEX: FIB4 SCORE: 0.63

## 2023-08-21 NOTE — LETTER
August 21, 2023         Patient: Naomi Peters   YOB: 1985   Date of Visit: 8/21/2023           To Whom it May Concern:    Naomi Peters was seen in my clinic on 8/21/2023. She may return to work on 8/23/23, she may return sooner if she feels improved.    If you have any questions or concerns, please don't hesitate to call.        Sincerely,           DELLA Mueller.P.RSTEPHEINE.  Electronically Signed

## 2023-09-14 DIAGNOSIS — E28.2 PCOS (POLYCYSTIC OVARIAN SYNDROME): ICD-10-CM

## 2023-09-14 DIAGNOSIS — E66.9 OBESITY (BMI 35.0-39.9 WITHOUT COMORBIDITY): ICD-10-CM

## 2023-09-14 RX ORDER — SEMAGLUTIDE 2.68 MG/ML
2 INJECTION, SOLUTION SUBCUTANEOUS
Qty: 6 ML | Refills: 2 | Status: SHIPPED | OUTPATIENT
Start: 2023-09-14 | End: 2023-11-07

## 2023-10-12 ENCOUNTER — APPOINTMENT (OUTPATIENT)
Dept: RADIOLOGY | Facility: MEDICAL CENTER | Age: 38
End: 2023-10-12
Attending: EMERGENCY MEDICINE
Payer: COMMERCIAL

## 2023-10-12 ENCOUNTER — HOSPITAL ENCOUNTER (EMERGENCY)
Facility: MEDICAL CENTER | Age: 38
End: 2023-10-12
Attending: EMERGENCY MEDICINE
Payer: COMMERCIAL

## 2023-10-12 VITALS
RESPIRATION RATE: 16 BRPM | HEART RATE: 88 BPM | SYSTOLIC BLOOD PRESSURE: 134 MMHG | BODY MASS INDEX: 36.52 KG/M2 | OXYGEN SATURATION: 99 % | DIASTOLIC BLOOD PRESSURE: 88 MMHG | HEIGHT: 63 IN | WEIGHT: 206.13 LBS | TEMPERATURE: 96.9 F

## 2023-10-12 DIAGNOSIS — M25.562 CHRONIC PAIN OF LEFT KNEE: ICD-10-CM

## 2023-10-12 DIAGNOSIS — G89.29 CHRONIC PAIN OF LEFT KNEE: ICD-10-CM

## 2023-10-12 PROCEDURE — A9270 NON-COVERED ITEM OR SERVICE: HCPCS | Performed by: EMERGENCY MEDICINE

## 2023-10-12 PROCEDURE — 99284 EMERGENCY DEPT VISIT MOD MDM: CPT

## 2023-10-12 PROCEDURE — 700102 HCHG RX REV CODE 250 W/ 637 OVERRIDE(OP): Performed by: EMERGENCY MEDICINE

## 2023-10-12 PROCEDURE — 73564 X-RAY EXAM KNEE 4 OR MORE: CPT | Mod: LT

## 2023-10-12 RX ORDER — MORPHINE SULFATE 15 MG/1
15 TABLET ORAL ONCE
Status: COMPLETED | OUTPATIENT
Start: 2023-10-12 | End: 2023-10-12

## 2023-10-12 RX ORDER — MORPHINE SULFATE 15 MG/1
7.5 TABLET ORAL EVERY 4 HOURS PRN
Qty: 12 TABLET | Refills: 0 | Status: SHIPPED | OUTPATIENT
Start: 2023-10-12 | End: 2023-10-16

## 2023-10-12 RX ADMIN — MORPHINE SULFATE 15 MG: 15 TABLET ORAL at 17:04

## 2023-10-12 ASSESSMENT — FIBROSIS 4 INDEX: FIB4 SCORE: 0.65

## 2023-10-12 NOTE — ED TRIAGE NOTES
"Chief Complaint   Patient presents with    Knee Pain     The pt reports injuring left knee in July and has been following her care plan. The pt reports falling off of couch and landing on left knee yesterday. The pt denies head strike and any other trauma. The pt reports an increase of pain and prescribed pain meds have not relieved the pain. The pt able to ambulate with crutches steadily.       Pt ambulatory with crutches to triage. Pt A&Ox4, for the above complaint.     Pt to lobby . Pt educated on alerting staff in changes to condition. Pt verbalized understanding.      BP (!) 132/91   Pulse (!) 107   Temp 37.2 °C (98.9 °F) (Temporal)   Resp 18   Ht 1.6 m (5' 3\")   Wt 93.5 kg (206 lb 2.1 oz)   SpO2 98%   BMI 36.51 kg/m²     "

## 2023-10-12 NOTE — LETTER
"  FORM C-4:  EMPLOYEE’S CLAIM FOR COMPENSATION/ REPORT OF INITIAL TREATMENT  EMPLOYEE’S CLAIM - PROVIDE ALL INFORMATION REQUESTED   First Name Naomi Last Name Fran Birthdate 1985  Sex female Claim Number   Home Address [address]             Zip [address]                                   Age  38 y.o. Height  1.6 m (5' 3\") Weight  93.5 kg (206 lb 2.1 oz) N  [id number]   Mailing Address [address]              Zip [address] Telephone  [phone] (home)  Primary Language Spoken   Insurer  *** Third Party   [employer] Employee's Occupation (Job Title) When Injury or Occupational Disease Occurred     Employer's Name [employer] Telephone [phone]    Employer Address [address]] Zip [address]   Date of Injury         Hour of Injury   Date Employer Notified   Last Day of Work after Injury or Occupational Disease   Supervisor to Whom Injury Reported     Address or Location of Accident (if applicable)    What were you doing at the time of accident? (if applicable)     How did this injury or occupational disease occur? Be specific and answer in detail. Use additional sheet if necessary)     If you believe that you have an occupational disease, when did you first have knowledge of the disability and it relationship to your employment?  Witnesses to the Accident     Nature of Injury or Occupational Disease   Part(s) of Body Injured or Affected  , ,     I CERTIFY THAT THE ABOVE IS TRUE AND CORRECT TO THE BEST OF MY KNOWLEDGE AND THAT I HAVE PROVIDED THIS INFORMATION IN ORDER TO OBTAIN THE BENEFITS OF [place]).  I HEREBY AUTHORIZE ANY PHYSICIAN, CHIROPRACTOR, SURGEON, PRACTITIONER, OR OTHER PERSON, ANY HOSPITAL, INCLUDING [facility] OR [facility], ANY MEDICAL " SERVICE ORGANIZATION, ANY INSURANCE COMPANY, OR OTHER INSTITUTION OR ORGANIZATION TO RELEASE TO EACH OTHER, ANY MEDICAL OR OTHER INFORMATION, INCLUDING BENEFITS PAID OR PAYABLE, PERTINENT TO THIS INJURY OR DISEASE, EXCEPT INFORMATION RELATIVE TO DIAGNOSIS, TREATMENT AND/OR COUNSELING FOR AIDS, PSYCHOLOGICAL CONDITIONS, ALCOHOL OR CONTROLLED SUBSTANCES, FOR WHICH I MUST GIVE SPECIFIC AUTHORIZATION.  A PHOTOSTAT OF THIS AUTHORIZATION SHALL BE AS VALID AS THE ORIGINAL.  Date                                      Place                                                                             Employee’s Signature   THIS REPORT MUST BE COMPLETED AND MAILED WITHIN 3 WORKING DAYS OF TREATMENT   Place [facility], EMERGENCY DEPT                       Name of Facility [facility]   Date  10/12/2023 Diagnosis  (M25.562,  G89.29) Chronic pain of left knee Is there evidence the injured employee was under the influence of alcohol and/or another controlled substance at the time of accident?   Hour  6:02 PM Description of Injury or Disease  Chronic pain of left knee No   Treatment  Pain medication, Xray  Have you advised the patient to remain off work five days or more?         No   X-Ray Findings  Negative If Yes   From Date    To Date      From information given by the employee, together with medical evidence, can you directly connect this injury or occupational disease as job incurred? Yes If No, is employee capable of: Full Duty  No Modified Duty  Yes   Is additional medical care by a physician indicated? Yes  Comments:orthopedics If Modified Duty, Specify any Limitations / Restrictions   Limited weight bearing until cleared by orthopedics.  Okay to use crutches   Do you know of any previous injury or disease contributing to this condition or occupational disease? No    Date 10/12/2023 Print Doctor’s Name Jose Paniagua I certify the employer’s copy of this form was mailed on:   Address  "[address] [address]  [phone] INSURER’S USE ONLY   Provider’s Tax ID Number   Telephone Dept: [phone]    Doctor’s Signature VALENTINA Grier M.D. Degree        Form C-4 (rev.10/07)                                                                         BRIEF DESCRIPTION OF RIGHTS AND BENEFITS  (Pursuant to [place] 616C.050)    Notice of Injury or Occupational Disease (Incident Report Form C-1): If an injury or occupational disease (OD) arises out of and in the course of employment, you must provide written notice to your employer as soon as practicable, but no later than 7 days after the accident or OD. Your employer shall maintain a sufficient supply of the required forms.    Claim for Compensation (Form C-4): If medical treatment is sought, the form C-4 is available at the place of initial treatment. A completed \"Claim for Compensation\" (Form C-4) must be filed within 90 days after an accident or OD. The treating physician or chiropractor must, within 3 working days after treatment, complete and mail to the employer, the employer's insurer and third-party , the Claim for Compensation.    Medical Treatment: If you require medical treatment for your on-the-job injury or OD, you may be required to select a physician or chiropractor from a list provided by your workers’ compensation insurer, if it has contracted with an Organization for Managed Care (MCO) or Preferred Provider Organization (PPO) or providers of health care. If your employer has not entered into a contract with an MCO or PPO, you may select a physician or chiropractor from the Panel of Physicians and Chiropractors. Any medical costs related to your industrial injury or OD will be paid by your insurer.    Temporary Total Disability (TTD): If your doctor has certified that you are unable to work for a period of at least 5 consecutive days, or 5 cumulative days in a 20-day period, or places restrictions on you that " your employer does not accommodate, you may be entitled to TTD compensation.    Temporary Partial Disability (TPD): If the wage you receive upon reemployment is less than the compensation for TTD to which you are entitled, the insurer may be required to pay you TPD compensation to make up the difference. TPD can only be paid for a maximum of 24 months.    Permanent Partial Disability (PPD): When your medical condition is stable and there is an indication of a PPD as a result of your injury or OD, within 30 days, your insurer must arrange for an evaluation by a rating physician or chiropractor to determine the degree of your PPD. The amount of your PPD award depends on the date of injury, the results of the PPD evaluation, your age and wage.    Permanent Total Disability (PTD): If you are medically certified by a treating physician or chiropractor as permanently and totally disabled and have been granted a PTD status by your insurer, you are entitled to receive monthly benefits not to exceed 66 2/3% of your average monthly wage. The amount of your PTD payments is subject to reduction if you previously received a lump-sum PPD award.    Vocational Rehabilitation Services: You may be eligible for vocational rehabilitation services if you are unable to return to the job due to a permanent physical impairment or permanent restrictions as a result of your injury or occupational disease.    Transportation and Per Shabbir Reimbursement: You may be eligible for travel expenses and per shabbir associated with medical treatment.    Reopening: You may be able to reopen your claim if your condition worsens after claim closure.     Appeal Process: If you disagree with a written determination issued by the insurer or the insurer does not respond to your request, you may appeal to the Department of Administration, , by following the instructions contained in your determination letter. You must appeal the determination  within 70 days from the date of the determination letter at [address], [address], [place], [place] [address], or [address], [address], [place], [place] [address]. If you disagree with the  decision, you may appeal to the Department of Administration, . You must file your appeal within 30 days from the date of the  decision letter at [address], [address], [place], [place] [address], or [address], [address], [place], [place] [address]. If you disagree with a decision of an , you may file a petition for judicial review with the District Court. You must do so within 30 days of the Appeal Officer’s decision. You may be represented by an  at your own expense or you may contact the [facility] for possible representation.    [place]  for Injured Workers (NAIW): If you disagree with a  decision, you may request that NAIW represent you without charge at an  Hearing. For information regarding denial of benefits, you may contact the [facility] at: [address], [address], [place] [address], [phone], or [address], [address], [place] [address], [phone]    To File a Complaint with the Division: If you wish to file a complaint with the  of the Division of Industrial Relations (DIR),  please contact the Workers’ Compensation Section, [address], [address], [place], [place] [address], telephone [phone]) [phone], or [address], [address], [place], [place] [address], telephone [phone].    For assistance with Workers’ Compensation Issues: You may contact the [facility] Office for Consumer Health Assistance, [address], [address], [place], [place] [address], Toll Free 1-529.837.2662, Web site: http://[facility].[place].gov/Programs/SABAS E-mail: sabas@[email or web address].[place].gov  D-2 (rev. 10/20)               __________________________________________________________________                                    _________________            Employee Name / Signature                                                                                                                            Date

## 2023-10-12 NOTE — LETTER
[facility], EMERGENCY DEPT   [address], [place] [address]  Phone: Dept: [phone] - Fax:        [facility] Progress Report and Disability Certification  Date of Service: 10/12/2023   No Show:  No  Date / Time of Next Visit:     Claim Information   Patient Name: Naomi Peters  Claim Number:     Employer: [employer]  Date of Injury:      Insurer / TPA: [facility] ID / SSN: [id number]    Occupation:  Diagnosis: The encounter diagnosis was Chronic pain of left knee.    Medical Information   Related to Industrial Injury?   ***   Subjective Complaints:      Objective Findings:     Pre-Existing Condition(s):     Assessment:        Status:    Permanent Disability:     Plan:      Diagnostics:      Comments:       Disability Information   Status:      From:     Through:   Restrictions are:     Physical Restrictions   Sitting:    Standing:    Stooping:    Bending:      Squatting:    Walking:    Climbing:    Pushing:      Pulling:    Other:    Reaching Above Shoulder (L):   Reaching Above Shoulder (R):       Reaching Below Shoulder (L):    Reaching Below Shoulder (R):      Not to exceed Weight Limits   Carrying(hrs):   Weight Limit(lb):   Lifting(hrs):   Weight  Limit(lb):     Comments:      Repetitive Actions   Hands: i.e. Fine Manipulations from Grasping:     Feet: i.e. Operating Foot Controls:     Driving / Operate Machinery:     Physician Name: Valentina Paniagua Physician Signature: VALENTINA Grier M.D. e-Signature:  , Medical Director   Clinic Name / Location: [facility], EMERGENCY DEPT  [address] [id number] [phone]     Clinic Phone Number: Dept: [phone]   Appointment Time:  Visit Start Time:    Check-In Time:  3:29 PM Visit Discharge Time:    Original-Treating Physician or Chiropractor    Page 2-Insurer/TPA    Page 3-Employer    Page 4-Employee

## 2023-10-12 NOTE — LETTER
[facility], EMERGENCY DEPT   [address], [place] [address]  Phone: Dept: [phone] - Fax:        [facility] Progress Report and Disability Certification  Date of Service: 10/12/2023   No Show:  No  Date / Time of Next Visit:     Claim Information   Patient Name: Naomi Peters  Claim Number:     Employer: [employer]  Date of Injury:      Insurer / TPA: [facility] ID / SSN: [id number]    Occupation:  Diagnosis: The encounter diagnosis was Chronic pain of left knee.    Medical Information   Related to Industrial Injury?   ***   Subjective Complaints:      Objective Findings:     Pre-Existing Condition(s):     Assessment:        Status:    Permanent Disability:     Plan:      Diagnostics:      Comments:       Disability Information   Status:      From:     Through:   Restrictions are:     Physical Restrictions   Sitting:    Standing:    Stooping:    Bending:      Squatting:    Walking:    Climbing:    Pushing:      Pulling:    Other:    Reaching Above Shoulder (L):   Reaching Above Shoulder (R):       Reaching Below Shoulder (L):    Reaching Below Shoulder (R):      Not to exceed Weight Limits   Carrying(hrs):   Weight Limit(lb):   Lifting(hrs):   Weight  Limit(lb):     Comments:      Repetitive Actions   Hands: i.e. Fine Manipulations from Grasping:     Feet: i.e. Operating Foot Controls:     Driving / Operate Machinery:     Physician Name: Valentina Paniagua Physician Signature: VALENTINA Grier M.D. e-Signature:  , Medical Director   Clinic Name / Location: [facility], EMERGENCY DEPT  [address] [id number] [phone]     Clinic Phone Number: Dept: [phone]   Appointment Time:  Visit Start Time:    Check-In Time:  3:29 PM Visit Discharge Time:    Original-Treating Physician or Chiropractor    Page 2-Insurer/TPA    Page 3-Employer    Page 4-Employee

## 2023-10-12 NOTE — LETTER
[facility], EMERGENCY DEPT   [address], [place] [address]  Phone: Dept: [phone] - Fax:        [facility] Progress Report and Disability Certification  Date of Service: 10/12/2023   No Show:  No  Date / Time of Next Visit:     Claim Information   Patient Name: Naomi Peters  Claim Number:     Employer: [employer]  Date of Injury:      Insurer / TPA: [facility] ID / SSN: [id number]    Occupation:  Diagnosis: The encounter diagnosis was Chronic pain of left knee.    Medical Information   Related to Industrial Injury?   ***   Subjective Complaints:      Objective Findings:     Pre-Existing Condition(s):     Assessment:        Status:    Permanent Disability:     Plan:      Diagnostics:      Comments:       Disability Information   Status:      From:     Through:   Restrictions are:     Physical Restrictions   Sitting:    Standing:    Stooping:    Bending:      Squatting:    Walking:    Climbing:    Pushing:      Pulling:    Other:    Reaching Above Shoulder (L):   Reaching Above Shoulder (R):       Reaching Below Shoulder (L):    Reaching Below Shoulder (R):      Not to exceed Weight Limits   Carrying(hrs):   Weight Limit(lb):   Lifting(hrs):   Weight  Limit(lb):     Comments:      Repetitive Actions   Hands: i.e. Fine Manipulations from Grasping:     Feet: i.e. Operating Foot Controls:     Driving / Operate Machinery:     Physician Name: Valentina Paniagua Physician Signature: VALENTINA Grier M.D. e-Signature:  , Medical Director   Clinic Name / Location: [facility], EMERGENCY DEPT  [address] [id number] [phone]     Clinic Phone Number: Dept: [phone]   Appointment Time:  Visit Start Time:    Check-In Time:  3:29 PM Visit Discharge Time:    Original-Treating Physician or Chiropractor    Page 2-Insurer/TPA    Page 3-Employer    Page 4-Employee

## 2023-10-12 NOTE — ED PROVIDER NOTES
ED Provider Note    CHIEF COMPLAINT  Chief Complaint   Patient presents with    Knee Pain     The pt reports injuring left knee in July and has been following her care plan. The pt reports falling off of couch and landing on left knee yesterday. The pt denies head strike and any other trauma. The pt reports an increase of pain and prescribed pain meds have not relieved the pain. The pt able to ambulate with crutches steadily.     EXTERNAL RECORDS REVIEWED  Reviewed ED visit on the 18th of this month after initial injury and visit to urgent care/occupational medicine on the 25th of July.  Seen on 7/30 for injury sustained from ground-level fall.    HPI/ROS  LIMITATION TO HISTORY   Select: : None  OUTSIDE HISTORIAN(S):  none    Naomi Peters is a 38 y.o. female who presents to the emergency room for evaluation of ongoing and worsening left-sided knee pain.  Patient states that she had initially injured her left knee while working in a  and has had several incidents while attempting to go back to work and be weightbearing which have caused some continued pain and discomfort.  She initially had tried anti-inflammatories and this had not been helpful.  She rolled off the couch last night and landed on her left knee causing further pain and discomfort.  She does not like the high sensation she gets from Norco medication and is asking to try something else.  Patient is otherwise without any longstanding medications.  She has a appointment with orthopedic physician at the end of this month.  He arrives with a neoprene brace, has been using crutches.    Her most recent fall off the couch she did not have any chest pain, other extremity discomfort or headaches    PAST MEDICAL HISTORY   has a past medical history of Anxiety, Depression, and PCOS (polycystic ovarian syndrome).    SURGICAL HISTORY   has a past surgical history that includes other.    FAMILY HISTORY  Family History   Problem Relation Age of  "Onset    Diabetes Mother     Cancer Mother         ovarian    Anxiety disorder Mother     Depression Mother     Alcohol abuse Father     Alcohol abuse Brother     Schizophrenia Brother     Anxiety disorder Brother     Depression Brother     Colon Cancer Maternal Aunt     Cancer Paternal Aunt         breast    Heart Disease Maternal Grandmother     Cancer Paternal Grandmother         pancreatic       SOCIAL HISTORY  Social History     Tobacco Use    Smoking status: Never    Smokeless tobacco: Never   Vaping Use    Vaping Use: Never used   Substance and Sexual Activity    Alcohol use: Yes     Comment: rarely, one drink every 2 months, social    Drug use: No    Sexual activity: Not Currently     Partners: Male     Birth control/protection: I.U.D.       CURRENT MEDICATIONS  Home Medications       Reviewed by Ed Crenshaw R.N. (Registered Nurse) on 10/12/23 at 1542  Med List Status: Partial     Medication Last Dose Status   busPIRone (BUSPAR) 10 MG Tab tablet  Active   clotrimazole (LOTRIMIN) 1 % Cream  Active   ketoconazole (NIZORAL) 2 % shampoo  Active   ketorolac (TORADOL) 10 MG Tab  Active   methocarbamol (ROBAXIN) 750 MG Tab  Active   pantoprazole (PROTONIX) 20 MG tablet  Active   REXULTI 2 MG Tab  Active   Semaglutide, 2 MG/DOSE, (OZEMPIC, 2 MG/DOSE,) 8 MG/3ML Solution Pen-injector  Active   venlafaxine ER (EFFEXOR) 225 MG TABLET SR 24 HR tablet  Active                  ALLERGIES  No Known Allergies    PHYSICAL EXAM  VITAL SIGNS: BP (!) 132/91   Pulse (!) 107   Temp 37.2 °C (98.9 °F) (Temporal)   Resp 18   Ht 1.6 m (5' 3\")   Wt 93.5 kg (206 lb 2.1 oz)   SpO2 98%   BMI 36.51 kg/m²    Genl: F sitting in gurney uncomfortably, speaking clearly, appears in no acute distress   Head: NC/AT   Pulmonary: Lungs are clear to auscultation bilaterally  CV:  RRR, no murmur appreciated, pulses 2+ in both upper and lower extremities,  Abdomen: soft, NT/ND  Musculoskeletal: Pain free ROM of the neck. Moving upper and lower " extremities in spontaneous and coordinated fashion  Left Lower Extremity  - Skin: no abrasions, lacerations or ecchymosis.  Numbness is easily recreated with palpation across the tibial plateau and along the lateral and medial insertions of the cross knee ligaments.  There is no visible effusion.  - Motor: Full ROM at hip and ankle; 5/5 ankle dorsal/plantar flexion, EHL/FHL intact  - 2+ dorsalis pedis and posterior tibialis, cap refill < 2 seconds x 5 digits    DIAGNOSTIC STUDIES / PROCEDURES    RADIOLOGY  I have independently interpreted the diagnostic imaging associated with this visit and am waiting the final reading from the radiologist.   My preliminary interpretation is as follows: no avulsion lesions or acute fracture  Radiologist interpretation:   DX-KNEE COMPLETE 4+ LEFT   Final Result      Unremarkable LEFT knee.        COURSE & MEDICAL DECISION MAKING    ED Observation Status? No; Patient does not meet criteria for ED Observation.     INITIAL ASSESSMENT, COURSE AND PLAN  Care Narrative: Patient presents with acute on chronic worsening left-sided knee pain.  The patient had some initial injury that sounded like a twisting and loading mechanism that could be related to ligamentous injury with ongoing pain and discomfort has not been alleviated by Norco.  She presents after falling and having a direct impact with new pain on the tibial plateau on clinical exam.  Thankfully she is neurovascularly intact, she was having some slight discomfort and was tachycardic on arrival.  She is afebrile and has no new obvious knee effusions.  To be treated with morphine sulfate tabs and a repeat x-ray was obtained.    X-ray shows no acute fracture    This time the patient will be placed in a more significant brace, she will be given work note for 1 day, advised to follow-up with her previously scheduled orthopedic physician/consultation later this month.    DISPOSITION AND DISCUSSIONS  I have discussed management of the  patient with the following physicians and JERMAN's:  none    Discussion of management with other Q or appropriate source(s): None     Escalation of care considered, and ultimately not performed:IV fluids and blood analysis    Decision tools and prescription drugs considered including, but not limited to: Pain Medications morphine sulfate .  In prescribing controlled substances to this patient, I certify that I have obtained and reviewed the medical history of Naomi Peters. I have also made a good robyn effort to obtain applicable records from other providers who have treated the patient and records did not demonstrate any increased risk of substance abuse that would prevent me from prescribing controlled substances.     I have conducted a physical exam and documented it. I have reviewed Ms. Peters’s prescription history as maintained by the [place] Prescription Monitoring Program.     I have assessed the patient’s risk for abuse, dependency, and addiction using the validated Opioid Risk Tool available at https://www.mdcalc.com/iqmrzg-jivh-ezxy-ort-narcotic-abuse.     Given the above, I believe the benefits of controlled substance therapy outweigh the risks. The reasons for prescribing controlled substances include non-narcotic, oral analgesic alternatives have been inadequate for pain control. Accordingly, I have discussed the risk and benefits, treatment plan, and alternative therapies with the patient.     FINAL DIAGNOSIS  1. Chronic pain of left knee      Electronically signed by: Jose Paniagua M.D., 10/12/2023 4:20 PM

## 2023-10-12 NOTE — LETTER
[facility], EMERGENCY DEPT   [address], [place] [address]  Phone: Dept: [phone] - Fax:        [facility] Progress Report and Disability Certification  Date of Service: 10/12/2023   No Show:  No  Date / Time of Next Visit: 10/31/2023   Claim Information   Patient Name: Naomi Peters  Claim Number:     Employer: [employer]  Date of Injury: 7/11/2023     Insurer / TPA: [employer] ID / SSN: [id number]   Occupation: Teacher Diagnosis: The encounter diagnosis was Chronic pain of left knee.    Medical Information   Related to Industrial Injury? No    Subjective Complaints:  Knee pain, acute on chronic   Objective Findings: No new swelling, no neurovascular compromise   Pre-Existing Condition(s): PCOS, depression, Prior job related knee pain   Assessment:   Initial Visit    Status: Additional Care Required  Comments:Orthopedic referral  Permanent Disability:No    Plan: Medication    Diagnostics: X-ray    Comments:  No new fracture, follow up with orthopedics    Disability Information   Status: Released to Restricted Duty    From:  10/12/2023  Through: 10/31/2023 Restrictions are: Temporary  Comments:until cleared by Orthopedics - weiht bearing as tolerated   Physical Restrictions   Sitting:    Standing:    Stooping:    Bending:      Squatting:    Walking:    Climbing:    Pushing:      Pulling:    Other:    Reaching Above Shoulder (L):   Reaching Above Shoulder (R):       Reaching Below Shoulder (L):    Reaching Below Shoulder (R):      Not to exceed Weight Limits   Carrying(hrs):   Weight Limit(lb):   Lifting(hrs):   Weight  Limit(lb):     Comments: Use of crutches and limited weight bearing as tolerated on left lower extremity until seen by Orthopedics    Repetitive Actions   Hands: i.e. Fine Manipulations from Grasping:     Feet: i.e. Operating Foot Controls:     Driving / Operate Machinery:     Physician Name: Jose Paniagua Physician Signature:  VALENTINA Grier M.D. e-Signature:  , Medical Director   Clinic Name / Location: [facility], EMERGENCY DEPT  [address] [place] [address]  [phone]     Clinic Phone Number: Dept: [phone]   Appointment Time:  Visit Start Time:    Check-In Time:  3:29 PM Visit Discharge Time:    Original-Treating Physician or Chiropractor    Page 2-Insurer/TPA    Page 3-Employer    Page 4-Employee

## 2023-10-12 NOTE — LETTER
[facility], EMERGENCY DEPT   [address] [place], [place] [address]  Phone: Dept: [phone] - Fax:        [facility] Progress Report and Disability Certification  Date of Service: 10/12/2023   No Show:  No  Date / Time of Next Visit:     Claim Information   Patient Name: Naomi Peetrs  Claim Number:     Employer: [employer]  Date of Injury:      Insurer / TPA: [facility] ID / SSN: [id number]    Occupation:  Diagnosis: The encounter diagnosis was Chronic pain of left knee.    Medical Information   Related to Industrial Injury?   ***   Subjective Complaints:      Objective Findings:     Pre-Existing Condition(s):     Assessment:        Status:    Permanent Disability:     Plan:      Diagnostics:      Comments:       Disability Information   Status:      From:     Through:   Restrictions are:     Physical Restrictions   Sitting:    Standing:    Stooping:    Bending:      Squatting:    Walking:    Climbing:    Pushing:      Pulling:    Other:    Reaching Above Shoulder (L):   Reaching Above Shoulder (R):       Reaching Below Shoulder (L):    Reaching Below Shoulder (R):      Not to exceed Weight Limits   Carrying(hrs):   Weight Limit(lb):   Lifting(hrs):   Weight  Limit(lb):     Comments:      Repetitive Actions   Hands: i.e. Fine Manipulations from Grasping:     Feet: i.e. Operating Foot Controls:     Driving / Operate Machinery:     Physician Name: Valentina Paniagua Physician Signature: VALENTINA Grier M.D. e-Signature:  , Medical Director   Clinic Name / Location: [facility], EMERGENCY DEPT  [address] [facility] [id number] [phone]     Clinic Phone Number: Dept: [phone]   Appointment Time:  Visit Start Time:    Check-In Time:  3:29 PM Visit Discharge Time:    Original-Treating Physician or Chiropractor    Page 2-Insurer/TPA    Page 3-Employer    Page 4-Employee

## 2023-10-12 NOTE — LETTER
[facility], EMERGENCY DEPT   [address] [place], [place] [address]  Phone: Dept: [phone] - Fax:        [facility] Progress Report and Disability Certification  Date of Service: 10/12/2023   No Show:  No  Date / Time of Next Visit:     Claim Information   Patient Name: Naomi Peters  Claim Number:     Employer: [employer]  Date of Injury:      Insurer / TPA: [facility] ID / SSN: [id number]    Occupation:  Diagnosis: The encounter diagnosis was Chronic pain of left knee.    Medical Information   Related to Industrial Injury?   ***   Subjective Complaints:      Objective Findings:     Pre-Existing Condition(s):     Assessment:        Status:    Permanent Disability:     Plan:      Diagnostics:      Comments:       Disability Information   Status:      From:     Through:   Restrictions are:     Physical Restrictions   Sitting:    Standing:    Stooping:    Bending:      Squatting:    Walking:    Climbing:    Pushing:      Pulling:    Other:    Reaching Above Shoulder (L):   Reaching Above Shoulder (R):       Reaching Below Shoulder (L):    Reaching Below Shoulder (R):      Not to exceed Weight Limits   Carrying(hrs):   Weight Limit(lb):   Lifting(hrs):   Weight  Limit(lb):     Comments:      Repetitive Actions   Hands: i.e. Fine Manipulations from Grasping:     Feet: i.e. Operating Foot Controls:     Driving / Operate Machinery:     Physician Name: Jose Paniagua Physician Signature:   e-Signature:  , Medical Director   Clinic Name / Location: [facility], EMERGENCY DEPT  [address] [facility] [id number] [phone]     Clinic Phone Number: Dept: [phone]   Appointment Time:  Visit Start Time:    Check-In Time:  3:29 PM Visit Discharge Time:    Original-Treating Physician or Chiropractor    Page 2-Insurer/TPA    Page 3-Employer    Page 4-Employee

## 2023-10-12 NOTE — LETTER
[facility], EMERGENCY DEPT   [address] [place], [place] [address]  Phone: Dept: [phone] - Fax:        [facility] Progress Report and Disability Certification  Date of Service: 10/12/2023   No Show:  No  Date / Time of Next Visit:     Claim Information   Patient Name: Naomi Peters  Claim Number:     Employer: [employer]  Date of Injury:      Insurer / TPA: ID / SSN: [id number]    Occupation:  Diagnosis: There were no encounter diagnoses.    Medical Information   Related to Industrial Injury?   ***   Subjective Complaints:      Objective Findings:     Pre-Existing Condition(s):     Assessment:        Status:    Permanent Disability:     Plan:      Diagnostics:      Comments:       Disability Information   Status:      From:     Through:   Restrictions are:     Physical Restrictions   Sitting:    Standing:    Stooping:    Bending:      Squatting:    Walking:    Climbing:    Pushing:      Pulling:    Other:    Reaching Above Shoulder (L):   Reaching Above Shoulder (R):       Reaching Below Shoulder (L):    Reaching Below Shoulder (R):      Not to exceed Weight Limits   Carrying(hrs):   Weight Limit(lb):   Lifting(hrs):   Weight  Limit(lb):     Comments:      Repetitive Actions   Hands: i.e. Fine Manipulations from Grasping:     Feet: i.e. Operating Foot Controls:     Driving / Operate Machinery:     Physician Name: Jose Paniagua Physician Signature:   e-Signature:  , Medical Director   Clinic Name / Location: [facility], EMERGENCY DEPT  [address] [place] [id number] [phone]     Clinic Phone Number: Dept: [phone]   Appointment Time:  Visit Start Time:    Check-In Time:  3:29 PM Visit Discharge Time:    Original-Treating Physician or Chiropractor    Page 2-Insurer/TPA    Page 3-Employer    Page 4-Employee

## 2023-10-12 NOTE — LETTER
[facility], EMERGENCY DEPT   [address] [place], [place] [address]  Phone: Dept: [phone] - Fax:        [facility] Progress Report and Disability Certification  Date of Service: 10/12/2023   No Show:  No  Date / Time of Next Visit: 10/31/2023   Claim Information   Patient Name: Naomi Peters  Claim Number:     Employer: [employer]  Date of Injury:   07/11/2023   Insurer / TPA: [employer] ID / SSN: [id number]   Occupation:  Diagnosis: The encounter diagnosis was Chronic pain of left knee.    Medical Information   Related to Industrial Injury? No    Subjective Complaints:  Knee pain, acute on chronic   Objective Findings: No new swelling, no neurovascular compromise   Pre-Existing Condition(s): PCOS, depression, Prior job related knee pain   Assessment:   Initial Visit    Status: Additional Care Required  Comments:Orthopedic referral  Permanent Disability:No    Plan: Medication    Diagnostics: X-ray    Comments:  No new fracture, follow up with orthopedics    Disability Information   Status: Released to Restricted Duty    From:  10/12/2023  Through: 10/31/2023 Restrictions are: Temporary  Comments:until cleared by Orthopedics - weiht bearing as tolerated   Physical Restrictions   Sitting:    Standing:    Stooping:    Bending:      Squatting:    Walking:    Climbing:    Pushing:      Pulling:    Other:    Reaching Above Shoulder (L):   Reaching Above Shoulder (R):       Reaching Below Shoulder (L):    Reaching Below Shoulder (R):      Not to exceed Weight Limits   Carrying(hrs):   Weight Limit(lb):   Lifting(hrs):   Weight  Limit(lb):     Comments: Use of crutches and limited weight bearing as tolerated on left lower extremity until seen by Orthopedics    Repetitive Actions   Hands: i.e. Fine Manipulations from Grasping:     Feet: i.e. Operating Foot Controls:     Driving / Operate Machinery:     Physician Name: Jose Paniagua Physician Signature:  VALENTINA Grier M.D. e-Signature:  , Medical Director   Clinic Name / Location: [facility], EMERGENCY DEPT  [address] [place] [address]  [phone]     Clinic Phone Number: Dept: [phone]   Appointment Time:  Visit Start Time:    Check-In Time:  3:29 PM Visit Discharge Time:    Original-Treating Physician or Chiropractor    Page 2-Insurer/TPA    Page 3-Employer    Page 4-Employee

## 2023-10-13 NOTE — ED NOTES
Discharge teaching and paperwork provided regarding risks of controlled substance medications and all questions/concerns answered. VSS,  assessment stable. Given information regarding home care and reasons to follow up with ED or primary MD. Patient provided controlled substance Rx. Patient discharged to the care of self and was wheeled out of the ED.

## 2023-11-07 ENCOUNTER — GYNECOLOGY VISIT (OUTPATIENT)
Dept: OBGYN | Facility: CLINIC | Age: 38
End: 2023-11-07
Payer: COMMERCIAL

## 2023-11-07 ENCOUNTER — OFFICE VISIT (OUTPATIENT)
Dept: MEDICAL GROUP | Facility: MEDICAL CENTER | Age: 38
End: 2023-11-07
Payer: COMMERCIAL

## 2023-11-07 ENCOUNTER — HOSPITAL ENCOUNTER (OUTPATIENT)
Facility: MEDICAL CENTER | Age: 38
End: 2023-11-07
Attending: PHYSICIAN ASSISTANT
Payer: COMMERCIAL

## 2023-11-07 VITALS
BODY MASS INDEX: 37.03 KG/M2 | WEIGHT: 209 LBS | SYSTOLIC BLOOD PRESSURE: 144 MMHG | DIASTOLIC BLOOD PRESSURE: 86 MMHG | HEIGHT: 63 IN

## 2023-11-07 VITALS
TEMPERATURE: 97.1 F | HEIGHT: 63 IN | SYSTOLIC BLOOD PRESSURE: 124 MMHG | RESPIRATION RATE: 16 BRPM | WEIGHT: 209.44 LBS | HEART RATE: 86 BPM | OXYGEN SATURATION: 96 % | BODY MASS INDEX: 37.11 KG/M2 | DIASTOLIC BLOOD PRESSURE: 82 MMHG

## 2023-11-07 DIAGNOSIS — R87.810 ASCUS WITH POSITIVE HIGH RISK HPV CERVICAL: ICD-10-CM

## 2023-11-07 DIAGNOSIS — Z12.4 SCREENING FOR CERVICAL CANCER: ICD-10-CM

## 2023-11-07 DIAGNOSIS — Z01.419 WELL WOMAN EXAM: ICD-10-CM

## 2023-11-07 DIAGNOSIS — N63.13 MASS OF LOWER OUTER QUADRANT OF RIGHT BREAST: ICD-10-CM

## 2023-11-07 DIAGNOSIS — N89.8 VAGINAL DISCHARGE: ICD-10-CM

## 2023-11-07 DIAGNOSIS — Z23 NEED FOR VACCINATION: ICD-10-CM

## 2023-11-07 DIAGNOSIS — R87.610 ASCUS WITH POSITIVE HIGH RISK HPV CERVICAL: ICD-10-CM

## 2023-11-07 DIAGNOSIS — J34.89 NASAL PAIN: ICD-10-CM

## 2023-11-07 DIAGNOSIS — Z32.00 ENCOUNTER FOR CONFIRMATION OF PREGNANCY TEST RESULT WITH PHYSICAL EXAMINATION: ICD-10-CM

## 2023-11-07 PROBLEM — S83.249A TEAR OF MEDIAL MENISCUS OF KNEE: Status: ACTIVE | Noted: 2023-08-03

## 2023-11-07 PROCEDURE — 3079F DIAST BP 80-89 MM HG: CPT | Performed by: PHYSICIAN ASSISTANT

## 2023-11-07 PROCEDURE — 87491 CHLMYD TRACH DNA AMP PROBE: CPT

## 2023-11-07 PROCEDURE — 87624 HPV HI-RISK TYP POOLED RSLT: CPT

## 2023-11-07 PROCEDURE — 88175 CYTOPATH C/V AUTO FLUID REDO: CPT

## 2023-11-07 PROCEDURE — 3077F SYST BP >= 140 MM HG: CPT | Performed by: PHYSICIAN ASSISTANT

## 2023-11-07 PROCEDURE — 90746 HEPB VACCINE 3 DOSE ADULT IM: CPT | Performed by: STUDENT IN AN ORGANIZED HEALTH CARE EDUCATION/TRAINING PROGRAM

## 2023-11-07 PROCEDURE — 3074F SYST BP LT 130 MM HG: CPT | Performed by: STUDENT IN AN ORGANIZED HEALTH CARE EDUCATION/TRAINING PROGRAM

## 2023-11-07 PROCEDURE — 87510 GARDNER VAG DNA DIR PROBE: CPT

## 2023-11-07 PROCEDURE — 90471 IMMUNIZATION ADMIN: CPT | Performed by: STUDENT IN AN ORGANIZED HEALTH CARE EDUCATION/TRAINING PROGRAM

## 2023-11-07 PROCEDURE — 90472 IMMUNIZATION ADMIN EACH ADD: CPT | Performed by: STUDENT IN AN ORGANIZED HEALTH CARE EDUCATION/TRAINING PROGRAM

## 2023-11-07 PROCEDURE — 90632 HEPA VACCINE ADULT IM: CPT | Performed by: STUDENT IN AN ORGANIZED HEALTH CARE EDUCATION/TRAINING PROGRAM

## 2023-11-07 PROCEDURE — 90715 TDAP VACCINE 7 YRS/> IM: CPT | Performed by: STUDENT IN AN ORGANIZED HEALTH CARE EDUCATION/TRAINING PROGRAM

## 2023-11-07 PROCEDURE — 3079F DIAST BP 80-89 MM HG: CPT | Performed by: STUDENT IN AN ORGANIZED HEALTH CARE EDUCATION/TRAINING PROGRAM

## 2023-11-07 PROCEDURE — 99213 OFFICE O/P EST LOW 20 MIN: CPT | Mod: 25 | Performed by: STUDENT IN AN ORGANIZED HEALTH CARE EDUCATION/TRAINING PROGRAM

## 2023-11-07 PROCEDURE — 87660 TRICHOMONAS VAGIN DIR PROBE: CPT

## 2023-11-07 PROCEDURE — 87591 N.GONORRHOEAE DNA AMP PROB: CPT

## 2023-11-07 PROCEDURE — 99395 PREV VISIT EST AGE 18-39: CPT | Performed by: PHYSICIAN ASSISTANT

## 2023-11-07 PROCEDURE — 87480 CANDIDA DNA DIR PROBE: CPT

## 2023-11-07 RX ORDER — LORAZEPAM 1 MG/1
TABLET ORAL
COMMUNITY
End: 2023-11-07

## 2023-11-07 RX ORDER — AMOXICILLIN AND CLAVULANATE POTASSIUM 875; 125 MG/1; MG/1
TABLET, FILM COATED ORAL
COMMUNITY
End: 2023-11-07

## 2023-11-07 RX ORDER — SEMAGLUTIDE 1.34 MG/ML
INJECTION, SOLUTION SUBCUTANEOUS
COMMUNITY
End: 2024-03-05

## 2023-11-07 RX ORDER — BUSPIRONE HYDROCHLORIDE 30 MG/1
TABLET ORAL
COMMUNITY
End: 2023-11-07

## 2023-11-07 RX ORDER — LORAZEPAM 1 MG/1
TABLET ORAL
COMMUNITY
Start: 2023-10-05

## 2023-11-07 RX ORDER — NYSTATIN 100000 U/G
CREAM TOPICAL
COMMUNITY
End: 2023-11-07

## 2023-11-07 RX ORDER — MORPHINE SULFATE 15 MG/1
TABLET ORAL
COMMUNITY
End: 2023-11-07

## 2023-11-07 RX ORDER — BREXPIPRAZOLE 1 MG/1
1 TABLET ORAL DAILY
COMMUNITY
End: 2024-03-05

## 2023-11-07 RX ORDER — FLUTICASONE PROPIONATE 50 MCG
1 SPRAY, SUSPENSION (ML) NASAL DAILY
Qty: 16 G | Refills: 0 | Status: SHIPPED | OUTPATIENT
Start: 2023-11-07 | End: 2024-03-05

## 2023-11-07 RX ORDER — BREXPIPRAZOLE 1 MG/1
TABLET ORAL
COMMUNITY
Start: 2023-08-24 | End: 2023-11-07

## 2023-11-07 RX ORDER — BUSPIRONE HYDROCHLORIDE 30 MG/1
30 TABLET ORAL 2 TIMES DAILY
COMMUNITY
Start: 2023-10-31

## 2023-11-07 ASSESSMENT — FIBROSIS 4 INDEX
FIB4 SCORE: 0.65
FIB4 SCORE: 0.65

## 2023-11-07 NOTE — PROGRESS NOTES
ANNUAL GYNECOLOGY VISIT    Chief Complaint  Annual    Subjective  Namoi Peters is a 38 y.o. female  Patient's last menstrual period was 10/07/2023 (exact date) using nothing for contraception who presents today for Annual Exam.      Pt c/o vaginal itching x 3 days. No vaginal discharge or odor.     Pt with hx of PCOS. Previously had nexplanon. Cannot take OCPs due to compliance. Would like to get Nexplanon again. Currently 2 weeks late for cycle.     Preventive Care   Immunization History   Administered Date(s) Administered    Covid-19 Mrna (Spikevax) Moderna 12+ Years 10/05/2023    Hepatitis A Vaccine, Adult 2023    Hepatitis A Vaccine, Ped/Adol 04/15/2002, 2008    Hepatitis B Vaccine (Adol/Adult) 2023    Hepatitis B Vaccine Adolescent/Pediatric 10/01/1998, 1998, 2006    Influenza Vaccine Quad Inj (Pf) 2017, 2020, 2022    Influenza Vaccine Quad Recombinant 10/05/2023    MMR Vaccine 1992, 10/01/1998    PFIZER PURPLE CAP SARS-COV-2 VACCINATION (12+) 2021, 2021, 2022    Tdap Vaccine 2009, 2023         Gynecology History and ROS  Current Sexual Activity: yes - multiple partners, male, does not always use protection   History of sexually transmitted diseases? yes - chlamydia in 20s  Abnormal discharge? no  Current Contraception:  nothing, Nexplanon removed few months ago     Menstrual History  Patient's last menstrual period was 10/07/2023 (exact date).  Periods are regular  q 28 days, lasting 3 days.   Clots or heavy flow: no  Dysmenorrhea: no  Intermenstrual bleeding/spotting: no  Significant pain with periods:no  Bothersome PMS symptoms: no  Significant Pelvic Pain: no    Pap History  Last pap smear: 2022 ASCUS with HPV other high risk types, told to f/u in 1 year, no colpo done  History of moderate or severe dysplasia: no    Cancer Risk Assessement:  Family history of:   - Breast cancer: Paternal aunts   -  Ovarian cancer: Mother (possibly)   - Uterine cancer: no   - Colon cancer: Maternal Aunt     Obstetric History  OB History    Para Term  AB Living   0 0 0 0 0 0   SAB IAB Ectopic Molar Multiple Live Births   0 0 0 0 0 0       Past Medical History  Past Medical History:   Diagnosis Date    Anxiety     Depression     PCOS (polycystic ovarian syndrome)        Past Surgical History  Past Surgical History:   Procedure Laterality Date    OTHER      back surgery       Social History  Social History     Tobacco Use    Smoking status: Never    Smokeless tobacco: Never   Vaping Use    Vaping Use: Never used   Substance Use Topics    Alcohol use: Yes     Comment: rarely, one drink every 2 months, social    Drug use: No        Family History  Family History   Problem Relation Age of Onset    Diabetes Mother     Cancer Mother         ovarian    Anxiety disorder Mother     Depression Mother     Alcohol abuse Father     Alcohol abuse Brother     Schizophrenia Brother     Anxiety disorder Brother     Depression Brother     Colon Cancer Maternal Aunt     Cancer Paternal Aunt         breast    Heart Disease Maternal Grandmother     Cancer Paternal Grandmother         pancreatic       Home Medications  Current Outpatient Medications   Medication Sig    busPIRone (BUSPAR) 30 MG tablet 30 mg 2 times a day.    LORazepam (ATIVAN) 1 MG Tab     Brexpiprazole (REXULTI) 1 MG Tab 1 mg every day.    Semaglutide, 1 MG/DOSE, (OZEMPIC, 1 MG/DOSE,) 4 MG/3ML Solution Pen-injector     fluticasone (FLONASE) 50 MCG/ACT nasal spray Administer 1 Spray into affected nostril(S) every day.    pantoprazole (PROTONIX) 20 MG tablet Take 1 Tablet by mouth every day. (Patient not taking: Reported on 2023)    ketoconazole (NIZORAL) 2 % shampoo Use daily, or every wash for next 2-3 weeks, then use 2-3 times per week as needed (Patient not taking: Reported on 2023)       Allergies/Reactions  Allergies   Allergen Reactions    Cat Hair Extract  "     Other reaction(s): Not available    Cat Pelt Standardized Allergenic Extract       ROS  Positive ROS: see HPI  Gen: no fevers or chills, no significant weight loss or gain, excessive fatigue  Respiratory:  no cough or dyspnea  Cardiac:  no chest pain, no palpitations, no syncope  Breast: no breast discharge, pain, lump or skin changes  GI:  no heartburn, no abdominal pain, no nausea or vomiting  Urinary: no dysuria, urgency, frequency, incontinence   Psych: no depression or anxiety  Neuro: no migraines with aura, fainting spells, numbness or tingling  Extremities: no joint pain, persistently swollen ankles, recurrent leg cramps      Physical Examination:  Vital Signs: BP (!) 144/86   Ht 5' 3\"   Wt 209 lb   LMP 10/07/2023 (Exact Date)   BMI 37.02 kg/m²       Constitutional: The patient is well developed and well nourished.  Psychiatric: Patient is oriented to time place and person.   Skin: No rash observed.  Neck: Appears symmetric. Thyroid normal size  Respiratory: normal effort  Breast: Inspection reveals no asymetry or nipple discharge, no skin thickening, dimpling or erythema.  Palpable oblong mass with well defined edges but questionably mobile to right outer lower breast.   Abdomen: Soft, non-tender.  Pelvic Exam:      Vulva: external female genitalia are normal in appearance. No lesions     Urethra - no lesions, no erythema     Vagina: moist, pink, normal ruggae     Cervix: pink, smooth, no lesions, no CMT     Uterus - non-tender, normal size, shape, contour, mobile, anteverted     Ovaries: non-tender, no appreciable masses    Pap Smear performed: Yes    Chaperone Present: ANGELIQUE Kolb  Extremeties: Legs are symmetric and without tenderness. There is no edema present.        Assessment & Plan  Naomi Peters is a 38 y.o. female who presents today for Annual Gyn Exam.     1. Well woman exam    -Anticipatory guidance given. Encouraged adequate water intake, healthy diet, regular exercise. " Educated on Pap smear screening and guidelines for age per ACOG and ASSCP. Discussed safe sex, STI prevention, contraception/family planning. Self breast exam taught.   - Start mammos at 40y    2. ASCUS with positive high risk HPV cervical/Screening for cervical cancer    - THINPREP PAP W/HPV AND CTNG; Future  - Discussed recommendations per ASCCP. If negative cotesting will need to repeat in 1 year, otherwise will likely need colpo. Reviewed procedure and expectations.     3. Vaginal discharge    - VAGINAL PATHOGENS DNA PANEL; Future    4. Mass of lower outer quadrant of right breast    - US-BREAST LIMITED-RIGHT; Future    5. Encounter for confirmation of pregnancy test result with physical examination    - POCT Pregnancy: negative   - Pt will schedule for Nexplanon placement           Return: Annually or PRN    Pam Hoskins P.A.-C.

## 2023-11-08 LAB
CANDIDA DNA VAG QL PROBE+SIG AMP: NEGATIVE
G VAGINALIS DNA VAG QL PROBE+SIG AMP: NEGATIVE
T VAGINALIS DNA VAG QL PROBE+SIG AMP: NEGATIVE

## 2023-11-12 LAB
C TRACH RRNA CVX QL NAA+PROBE: NEGATIVE
CYTOLOGIST CVX/VAG CYTO: ABNORMAL
CYTOLOGY CVX/VAG DOC CYTO: ABNORMAL
CYTOLOGY CVX/VAG DOC THIN PREP: ABNORMAL
DX ICD CODE: ABNORMAL
HPV I/H RISK 4 DNA CVX QL PROBE+SIG AMP: NEGATIVE
N GONORRHOEA RRNA CVX QL NAA+PROBE: NEGATIVE
NOTE NL11727A: ABNORMAL
OTHER STN SPEC: ABNORMAL
PATHOLOGIST CVX/VAG CYTO: ABNORMAL
STAT OF ADQ CVX/VAG CYTO-IMP: ABNORMAL

## 2023-11-14 ENCOUNTER — TELEPHONE (OUTPATIENT)
Dept: OBGYN | Facility: CLINIC | Age: 38
End: 2023-11-14
Payer: COMMERCIAL

## 2023-11-14 NOTE — TELEPHONE ENCOUNTER
----- Message from Pam Hoskins P.A.-C. sent at 11/14/2023 10:40 AM PST -----  Pap abnormal for ASCUS and HPV neg, prior was ASCUS and HPV pos. Pt needs to schedule for colpo. I discussed likelihood of needing colpo and what to expect at her visit. Please get her scheduled with a physician.  Pam Hoskins P.A.-C.    11/14/2023 1059  Pt notified of abnormal pap and need for colposcopy. Procedure explained to pt. Colpo scheduled for 12/18/2023 at 1115 with Dr Vergara. All questions answered. Pt agreed and verbalized understanding.

## 2023-11-29 DIAGNOSIS — N63.13 MASS OF LOWER OUTER QUADRANT OF RIGHT BREAST: ICD-10-CM

## 2023-12-08 ENCOUNTER — GYNECOLOGY VISIT (OUTPATIENT)
Dept: OBGYN | Facility: CLINIC | Age: 38
End: 2023-12-08
Payer: COMMERCIAL

## 2023-12-08 VITALS — DIASTOLIC BLOOD PRESSURE: 78 MMHG | BODY MASS INDEX: 37.75 KG/M2 | WEIGHT: 213.1 LBS | SYSTOLIC BLOOD PRESSURE: 134 MMHG

## 2023-12-08 DIAGNOSIS — Z30.017 NEXPLANON INSERTION: ICD-10-CM

## 2023-12-08 LAB
POCT INT CON NEG: NEGATIVE
POCT INT CON POS: POSITIVE
POCT URINE PREGNANCY TEST: NEGATIVE

## 2023-12-08 PROCEDURE — 3075F SYST BP GE 130 - 139MM HG: CPT | Performed by: PHYSICIAN ASSISTANT

## 2023-12-08 PROCEDURE — 11981 INSERTION DRUG DLVR IMPLANT: CPT | Performed by: PHYSICIAN ASSISTANT

## 2023-12-08 PROCEDURE — 81025 URINE PREGNANCY TEST: CPT | Performed by: PHYSICIAN ASSISTANT

## 2023-12-08 PROCEDURE — 3078F DIAST BP <80 MM HG: CPT | Performed by: PHYSICIAN ASSISTANT

## 2023-12-08 ASSESSMENT — FIBROSIS 4 INDEX: FIB4 SCORE: 0.65

## 2023-12-08 NOTE — PROGRESS NOTES
Nexplanon Placement Procedure Note    Naomi Peters  here for Nexplanon insertion.     Patient presents for desired long term reversible contraception.  R/B/A discussed with patient, including pain during placement, bleeding, bruising, and irregular uterine bleeding following placement.  Procedure discussed at length with patient, including use of local anesthetic.  She is right hand dominant.  Consent form signed.    Pregnancy was excluded by UPT and LMP    Patient is on 6 day of her cycle.  She was counseled that she does not need back up protection for the next 7 days.    /78   Wt 213 lb 1.6 oz   LMP 2023   BMI 37.75 kg/m²     The patient was positioned on the examination table with her non-dominant (left) arm flexed at the elbow and externally rotated so that her wrist was parallel to her ear.  The insertion site was identified at the inner side of the non-dominant upper arm about 8-10cm (3-4 inches) above the medial epicondyle of the humerus.  The insertion site was cleansed with betadine and 3mL of 1% lidocaine was injected subcutaneously.      The nexplanon device was removed from the package and the protective covering was removed from the device.  The white colored implant was verified in the device.  The skin was punctured with the device at 30 degrees at the previously identified insertion site.  The applicator was then lowered to the horizontal position and the needle was then inserted to its full length.  The slider was then pulled back fully and the implant was released.  Presence of the implant was verified by both the physician and the patient.  Minimal blood loss.  A bandage was placed over the insertion site and kerlex wrap was applied.  Patient tolerated the procedure well.     Patient informed Nexplanon is effective for 3 years. Card given to remind patient when it is due for removal.     Nexplanon implant information was collected and is to be scanned into the  patient's electronic medical record.    Pam Hoskins P.A.-C.

## 2023-12-11 ENCOUNTER — HOSPITAL ENCOUNTER (OUTPATIENT)
Dept: RADIOLOGY | Facility: MEDICAL CENTER | Age: 38
End: 2023-12-11
Attending: PHYSICIAN ASSISTANT
Payer: COMMERCIAL

## 2023-12-11 DIAGNOSIS — N63.13 MASS OF LOWER OUTER QUADRANT OF RIGHT BREAST: ICD-10-CM

## 2023-12-11 PROCEDURE — G0279 TOMOSYNTHESIS, MAMMO: HCPCS

## 2023-12-11 PROCEDURE — 76642 ULTRASOUND BREAST LIMITED: CPT | Mod: RT

## 2023-12-18 ENCOUNTER — HOSPITAL ENCOUNTER (OUTPATIENT)
Facility: MEDICAL CENTER | Age: 38
End: 2023-12-18
Attending: OBSTETRICS & GYNECOLOGY
Payer: COMMERCIAL

## 2023-12-18 ENCOUNTER — GYNECOLOGY VISIT (OUTPATIENT)
Dept: OBGYN | Facility: CLINIC | Age: 38
End: 2023-12-18
Payer: COMMERCIAL

## 2023-12-18 VITALS — WEIGHT: 214 LBS | BODY MASS INDEX: 37.91 KG/M2 | DIASTOLIC BLOOD PRESSURE: 81 MMHG | SYSTOLIC BLOOD PRESSURE: 92 MMHG

## 2023-12-18 DIAGNOSIS — R87.810 ASCUS WITH POSITIVE HIGH RISK HPV CERVICAL: ICD-10-CM

## 2023-12-18 DIAGNOSIS — R87.610 ASCUS WITH POSITIVE HIGH RISK HPV CERVICAL: ICD-10-CM

## 2023-12-18 LAB — PATHOLOGY CONSULT NOTE: NORMAL

## 2023-12-18 PROCEDURE — 3074F SYST BP LT 130 MM HG: CPT | Performed by: OBSTETRICS & GYNECOLOGY

## 2023-12-18 PROCEDURE — 3079F DIAST BP 80-89 MM HG: CPT | Performed by: OBSTETRICS & GYNECOLOGY

## 2023-12-18 PROCEDURE — 88305 TISSUE EXAM BY PATHOLOGIST: CPT | Mod: 59

## 2023-12-18 PROCEDURE — 88342 IMHCHEM/IMCYTCHM 1ST ANTB: CPT | Mod: 91

## 2023-12-18 PROCEDURE — 57454 BX/CURETT OF CERVIX W/SCOPE: CPT | Performed by: OBSTETRICS & GYNECOLOGY

## 2023-12-18 ASSESSMENT — FIBROSIS 4 INDEX: FIB4 SCORE: 0.65

## 2023-12-18 NOTE — PROGRESS NOTES
Naomi Peters  38 y.o.  Female  here today for colposcopy to evaluate her for abnormal pap:     Colposcopy    Indication:  Current pap ASCUS HPV negative with ASCUS HPV positive one zoë ago and abnormal pap with colpo and biopsies 2-3 yeas ago at outside facility    Prior to beginning the procedure, risk and benefits of a colposcopy with possibility of biopsies were discussed including the risk of infection, bleeding, and pain. Patient understands the risks associated with the procedure, questions have been answered and consents have been signed to the chart.    Procedure in detail:    Speculum is placed and cervix is visualized. The cervix is cleansed with dilute acetic acid solution.     Physical Exam  Genitourinary:           Comments: Biopsies at 12 and 3 o'clock.   Very friable with brisk bleeding after biopsies.   ECC performed.   Monsels used to obtain hemostasis.         Satisfactory: YES   Squamo-columnar junction seen: YES   Entire lesion seen: YES   Biopsies done: YES   Biopsy site:  12 and 3 o'clock  ECC: YES  Hemostasis: obtained with the use of Monsels    Impression:  38 y.o.  here for colposcopy for persistent ASCUS pap.   - Friable cervix with brisk bleeding with touch and biopsy  - small AWE seen. Normal to mild based on physical visualization.   BIOpsies and ECC performed.       Recommendations:  - based on pathology report. Will contact patient.

## 2023-12-20 ENCOUNTER — HOSPITAL ENCOUNTER (EMERGENCY)
Facility: MEDICAL CENTER | Age: 38
End: 2023-12-20
Attending: EMERGENCY MEDICINE
Payer: COMMERCIAL

## 2023-12-20 ENCOUNTER — APPOINTMENT (OUTPATIENT)
Dept: RADIOLOGY | Facility: MEDICAL CENTER | Age: 38
End: 2023-12-20
Attending: EMERGENCY MEDICINE
Payer: COMMERCIAL

## 2023-12-20 VITALS
TEMPERATURE: 98.1 F | DIASTOLIC BLOOD PRESSURE: 81 MMHG | HEIGHT: 63 IN | BODY MASS INDEX: 37.74 KG/M2 | RESPIRATION RATE: 16 BRPM | OXYGEN SATURATION: 98 % | WEIGHT: 213 LBS | HEART RATE: 80 BPM | SYSTOLIC BLOOD PRESSURE: 140 MMHG

## 2023-12-20 DIAGNOSIS — M25.562 CHRONIC PAIN OF LEFT KNEE: ICD-10-CM

## 2023-12-20 DIAGNOSIS — G89.29 CHRONIC PAIN OF LEFT KNEE: ICD-10-CM

## 2023-12-20 PROCEDURE — 73564 X-RAY EXAM KNEE 4 OR MORE: CPT | Mod: LT

## 2023-12-20 PROCEDURE — A9270 NON-COVERED ITEM OR SERVICE: HCPCS | Performed by: EMERGENCY MEDICINE

## 2023-12-20 PROCEDURE — 700102 HCHG RX REV CODE 250 W/ 637 OVERRIDE(OP): Performed by: EMERGENCY MEDICINE

## 2023-12-20 PROCEDURE — 99283 EMERGENCY DEPT VISIT LOW MDM: CPT

## 2023-12-20 RX ORDER — HYDROCODONE BITARTRATE AND ACETAMINOPHEN 5; 325 MG/1; MG/1
1 TABLET ORAL EVERY 4 HOURS PRN
Qty: 6 TABLET | Refills: 0 | Status: SHIPPED | OUTPATIENT
Start: 2023-12-20 | End: 2023-12-22

## 2023-12-20 RX ORDER — HYDROCODONE BITARTRATE AND ACETAMINOPHEN 5; 325 MG/1; MG/1
1 TABLET ORAL ONCE
Status: COMPLETED | OUTPATIENT
Start: 2023-12-20 | End: 2023-12-20

## 2023-12-20 RX ADMIN — HYDROCODONE BITARTRATE AND ACETAMINOPHEN 1 TABLET: 5; 325 TABLET ORAL at 14:24

## 2023-12-20 ASSESSMENT — FIBROSIS 4 INDEX: FIB4 SCORE: 0.65

## 2023-12-20 NOTE — LETTER
[facility], EMERGENCY DEPT   [address], [place] [address]  Phone: Dept: [phone] - Fax:        [facility] Progress Report and Disability Certification  Date of Service: 12/20/2023   No Show:  No  Date / Time of Next Visit:     Claim Information   Patient Name: Naomi Peters  Claim Number:     Employer: [employer]  Date of Injury: 7/11/2023     Insurer / TPA: Nathaniel ID / SSN:    Occupation: Teacher Diagnosis: The encounter diagnosis was Chronic pain of left knee.    Medical Information   Related to Industrial Injury? Yes    Subjective Complaints:  Left knee pain, without new injury   Objective Findings: Diffuse pain left knee   Pre-Existing Condition(s): Injured knee in 7/11/2023   Assessment:   Condition Worsened    Status: Additional Care Required  Permanent Disability:No    Plan: Medication    Diagnostics:      Comments:       Disability Information   Status: Released to Restricted Duty    From:     Through:   Restrictions are:     Physical Restrictions   Sitting:    Standing:    Stooping:    Bending:      Squatting:    Walking:    Climbing:    Pushing:      Pulling:    Other:    Reaching Above Shoulder (L):   Reaching Above Shoulder (R):       Reaching Below Shoulder (L):    Reaching Below Shoulder (R):      Not to exceed Weight Limits   Carrying(hrs):   Weight Limit(lb):   Lifting(hrs):   Weight  Limit(lb):     Comments: Avoid activities that cause pain. No lifting over 15 ppunds, no extnded standing, bending, lifting    Repetitive Actions   Hands: i.e. Fine Manipulations from Grasping:     Feet: i.e. Operating Foot Controls:     Driving / Operate Machinery:     Physician Name: Alecia Doe Physician Signature: ALECIA Cobb D.O. e-Signature:  , Medical Director   Clinic Name / Location: [facility], EMERGENCY DEPT  [address] [facility]  [address]  [phone]     Clinic Phone Number: Dept: [phone]   Appointment Time:  Visit Start Time:    Check-In Time:  12:54 PM Visit Discharge Time:    Original-Treating Physician or Chiropractor    Page 2-Insurer/TPA    Page 3-Employer    Page 4-Employee

## 2023-12-20 NOTE — ED TRIAGE NOTES
"Chief Complaint   Patient presents with    Knee Pain     Patient reports L knee pain. Patient reports initial injury July 11th. Patient states this morning the pain became unbearable. Patient using crutches to ambulate       37 yo female to triage for above complaint.   Xray protocol ordered.    Pt is alert and oriented, speaking in full sentences, follows commands and responds appropriately to questions.     Patient placed back in lobby and educated on triage process. Asked to inform RN of any changes.    BP (!) 151/90   Pulse 85   Temp 35.9 °C (96.6 °F) (Temporal)   Resp 16   Ht 1.6 m (5' 3\")   Wt 96.6 kg (213 lb)   LMP 12/02/2023 (Exact Date)   SpO2 99%   BMI 37.73 kg/m²     "

## 2023-12-20 NOTE — ED PROVIDER NOTES
ED Provider Note    CHIEF COMPLAINT  Chief Complaint   Patient presents with    Knee Pain     Patient reports L knee pain. Patient reports initial injury July 11th. Patient states this morning the pain became unbearable. Patient using crutches to ambulate       EXTERNAL RECORDS REVIEWED  Patient's last encounter was in the OB/GYN office.  She was diagnosed with ASCUS with positive high risk HPV, cervical    HPI/ROS  LIMITATION TO HISTORY   Select: : None  OUTSIDE HISTORIAN(S):  None    Naomi Peters is a 38 y.o. female who presents to the emergency department with a chief complaint of left knee pain.  Patient states she woke up at 4:00 this morning complaining of sharp, needlelike pain to her left knee.  Patient has ongoing pain to this knee.  She injured it in July of this year at work.  She reports a twisting injury at that time.  She has followed up with orthopedics on a monthly basis.  Her next appointment is January 12.  She is awaiting PT being set up so she can begin going to that as well.  She has been evaluated in the past with x-ray as well as MRI and told that there was nothing operative about her wound.  She is using crutches.  She denies any new injuries over the last few days.  She does report that though she wears a brace every day.  She did not wear it for about a week while she washed and dried it multiple times.  This is the only potential source of new pain that she can think of.  This was about a week ago.  She had been taking Norco in the past but has run out.  Now she is simply on Tylenol or ibuprofen.    PAST MEDICAL HISTORY   has a past medical history of Anxiety, Depression, and PCOS (polycystic ovarian syndrome).    SURGICAL HISTORY   has a past surgical history that includes other.    FAMILY HISTORY  Family History   Problem Relation Age of Onset    Diabetes Mother     Cancer Mother         ovarian    Anxiety disorder Mother     Depression Mother     Alcohol abuse Father      "Alcohol abuse Brother     Schizophrenia Brother     Anxiety disorder Brother     Depression Brother     Colon Cancer Maternal Aunt     Cancer Paternal Aunt         breast    Heart Disease Maternal Grandmother     Cancer Paternal Grandmother         pancreatic       SOCIAL HISTORY  Social History     Tobacco Use    Smoking status: Never    Smokeless tobacco: Never   Vaping Use    Vaping Use: Never used   Substance and Sexual Activity    Alcohol use: Yes     Comment: rarely, one drink every 2 months, social    Drug use: No    Sexual activity: Not Currently     Partners: Male     Birth control/protection: I.U.D.       CURRENT MEDICATIONS  Home Medications       Reviewed by Nohemy Chadwick R.N. (Registered Nurse) on 12/20/23 at 1259  Med List Status: Partial     Medication Last Dose Status   Brexpiprazole (REXULTI) 1 MG Tab  Active   busPIRone (BUSPAR) 30 MG tablet  Active   fluticasone (FLONASE) 50 MCG/ACT nasal spray  Active   ketoconazole (NIZORAL) 2 % shampoo  Active   LORazepam (ATIVAN) 1 MG Tab  Active   pantoprazole (PROTONIX) 20 MG tablet  Active   Semaglutide, 1 MG/DOSE, (OZEMPIC, 1 MG/DOSE,) 4 MG/3ML Solution Pen-injector  Active                    ALLERGIES  Allergies   Allergen Reactions    Cat Hair Extract      Other reaction(s): Not available    Cat Pelt Standardized Allergenic Extract       PHYSICAL EXAM  VITAL SIGNS: BP (!) 151/90   Pulse 85   Temp 35.9 °C (96.6 °F) (Temporal)   Resp 16   Ht 1.6 m (5' 3\")   Wt 96.6 kg (213 lb)   LMP 12/02/2023 (Exact Date)   SpO2 99%   BMI 37.73 kg/m²    Vitals reviewed.  Constitutional: Patient is oriented to person, place, and time. Appears well-developed and well-nourished. No distress.    Head: Normocephalic and atraumatic.   Eyes: Conjunctivae are normal.  Neck: Normal range of motion. Neck supple.   Cardiovascular: Normal rate, regular rhythm and normal heart sounds. Normal peripheral pulses.  Pulmonary/Chest: Effort normal and breath sounds normal. No " respiratory distress, no wheezes, rhonchi, or rales.   Abdominal: Soft. There is no tenderness  Musculoskeletal: No edema.  No deformities.  No increased swelling on the left side.  There is diffuse tenderness.  No increased warmth, redness, discoloration to the left leg compared to the contralateral side.  Neurological: No focal deficits.   Skin: Skin is warm and dry. No erythema. No pallor.   Psychiatric: Patient has a normal mood and affect.     DIAGNOSTIC STUDIES / PROCEDURES    COURSE & MEDICAL DECISION MAKING    ED Observation Status? No; Patient does not meet criteria for ED Observation.     INITIAL ASSESSMENT, COURSE AND PLAN  Care Narrative:     This is a pleasant 38-year-old female.  She has been experiencing chronic left knee pain since July of this year when she injured it at work.  She is being followed with the Worker's Comp. clinic as well as orthopedics for this injury and awaiting PT being set up.  She denies any new injuries.  No traumatic findings to the knee.  There is diffuse pain without significant swelling or discoloration.  I do not see indication for repeat imaging.  She was directed here to the emergency department due to the Worker's Comp. nature of the injury.  She is requesting pain medication.  Patient will be prescribed a short course of Norco which she found the most helpful.  She is also been prescribed morphine IR in the past.   was checked.  There is no concerning prescription filling pattern.  She will follow-up with the occupational health clinic as well as orthopedics.  She will continue to wear her brace and use her crutches.  She is given return precautions.  She is discharged in stable condition.    DISPOSITION AND DISCUSSIONS  I have discussed management of the patient with the following physicians and JERMAN's:  None    Discussion of management with other QHP or appropriate source(s): None     Escalation of care considered, and ultimately not performed:diagnostic  imaging    Barriers to care at this time, including but not limited to:  None .     Decision tools and prescription drugs considered including, but not limited to: None    FINAL DIAGNOSIS  No diagnosis found.       Electronically signed by: Alecia Doe D.O., 12/20/2023 2:01 PM

## 2023-12-20 NOTE — ED NOTES
DC instructions reviewed with pt. Pt verbalized understanding. Pt ambulated to [facility] with steady gait.

## 2024-01-03 ENCOUNTER — HOSPITAL ENCOUNTER (OUTPATIENT)
Facility: MEDICAL CENTER | Age: 39
End: 2024-01-03
Attending: PHYSICIAN ASSISTANT
Payer: COMMERCIAL

## 2024-01-03 ENCOUNTER — OFFICE VISIT (OUTPATIENT)
Dept: URGENT CARE | Facility: CLINIC | Age: 39
End: 2024-01-03
Payer: COMMERCIAL

## 2024-01-03 VITALS
WEIGHT: 213 LBS | HEART RATE: 91 BPM | SYSTOLIC BLOOD PRESSURE: 104 MMHG | RESPIRATION RATE: 14 BRPM | TEMPERATURE: 97.3 F | DIASTOLIC BLOOD PRESSURE: 62 MMHG | OXYGEN SATURATION: 97 % | HEIGHT: 63 IN | BODY MASS INDEX: 37.74 KG/M2

## 2024-01-03 DIAGNOSIS — N89.8 VAGINAL DISCHARGE: ICD-10-CM

## 2024-01-03 DIAGNOSIS — R39.9 URINARY TRACT INFECTION SYMPTOMS: ICD-10-CM

## 2024-01-03 LAB
AMBIGUOUS DTTM AMBI4: NORMAL
APPEARANCE UR: NORMAL
BILIRUB UR STRIP-MCNC: NEGATIVE MG/DL
COLOR UR AUTO: NORMAL
GLUCOSE UR STRIP.AUTO-MCNC: NEGATIVE MG/DL
KETONES UR STRIP.AUTO-MCNC: NEGATIVE MG/DL
LEUKOCYTE ESTERASE UR QL STRIP.AUTO: NORMAL
NITRITE UR QL STRIP.AUTO: NEGATIVE
PH UR STRIP.AUTO: 5.5 [PH] (ref 5–8)
POCT INT CON NEG: NEGATIVE
POCT INT CON POS: POSITIVE
POCT URINE PREGNANCY TEST: NEGATIVE
PROT UR QL STRIP: 100 MG/DL
RBC UR QL AUTO: NORMAL
SP GR UR STRIP.AUTO: >=1.03
UROBILINOGEN UR STRIP-MCNC: 0.2 MG/DL

## 2024-01-03 PROCEDURE — 87660 TRICHOMONAS VAGIN DIR PROBE: CPT

## 2024-01-03 PROCEDURE — 87086 URINE CULTURE/COLONY COUNT: CPT

## 2024-01-03 PROCEDURE — 87186 SC STD MICRODIL/AGAR DIL: CPT

## 2024-01-03 PROCEDURE — 3078F DIAST BP <80 MM HG: CPT | Performed by: PHYSICIAN ASSISTANT

## 2024-01-03 PROCEDURE — 87480 CANDIDA DNA DIR PROBE: CPT

## 2024-01-03 PROCEDURE — 81025 URINE PREGNANCY TEST: CPT | Performed by: PHYSICIAN ASSISTANT

## 2024-01-03 PROCEDURE — 87591 N.GONORRHOEAE DNA AMP PROB: CPT

## 2024-01-03 PROCEDURE — 3074F SYST BP LT 130 MM HG: CPT | Performed by: PHYSICIAN ASSISTANT

## 2024-01-03 PROCEDURE — 87491 CHLMYD TRACH DNA AMP PROBE: CPT

## 2024-01-03 PROCEDURE — 87077 CULTURE AEROBIC IDENTIFY: CPT

## 2024-01-03 PROCEDURE — 87510 GARDNER VAG DNA DIR PROBE: CPT

## 2024-01-03 PROCEDURE — 81002 URINALYSIS NONAUTO W/O SCOPE: CPT | Performed by: PHYSICIAN ASSISTANT

## 2024-01-03 PROCEDURE — 99213 OFFICE O/P EST LOW 20 MIN: CPT | Performed by: PHYSICIAN ASSISTANT

## 2024-01-03 RX ORDER — NITROFURANTOIN 25; 75 MG/1; MG/1
100 CAPSULE ORAL 2 TIMES DAILY
Qty: 10 CAPSULE | Refills: 0 | Status: SHIPPED | OUTPATIENT
Start: 2024-01-03 | End: 2024-01-05 | Stop reason: SDUPTHER

## 2024-01-03 ASSESSMENT — ENCOUNTER SYMPTOMS
NAUSEA: 0
VOMITING: 0
CHILLS: 0
BACK PAIN: 1
FLANK PAIN: 0
SWEATS: 0
HEADACHES: 0
FEVER: 0
ABDOMINAL PAIN: 0

## 2024-01-03 ASSESSMENT — FIBROSIS 4 INDEX: FIB4 SCORE: 0.65

## 2024-01-03 NOTE — PROGRESS NOTES
Subjective     Colleen Peters is a 38 y.o. female who presents with UTI (X 4 days )            Dysuria   This is a new problem. The current episode started in the past 7 days. The problem occurs intermittently. The problem has been waxing and waning. The quality of the pain is described as burning. The pain is moderate. There has been no fever. She is Sexually active. There is No history of pyelonephritis. Associated symptoms include a discharge, frequency, hematuria and urgency. Pertinent negatives include no chills, flank pain, hesitancy, nausea, possible pregnancy, sweats or vomiting. She has tried nothing for the symptoms. There is no history of kidney stones or recurrent UTIs.       Past Medical History:   Diagnosis Date    Anxiety     Depression     PCOS (polycystic ovarian syndrome)          Past Surgical History:   Procedure Laterality Date    OTHER      back surgery         Family History   Problem Relation Age of Onset    Diabetes Mother     Cancer Mother         ovarian    Anxiety disorder Mother     Depression Mother     Alcohol abuse Father     Alcohol abuse Brother     Schizophrenia Brother     Anxiety disorder Brother     Depression Brother     Colon Cancer Maternal Aunt     Cancer Paternal Aunt         breast    Heart Disease Maternal Grandmother     Cancer Paternal Grandmother         pancreatic         Allergies:  Cat hair extract      Medications, Allergies, and current problem list reviewed today in Epic      Review of Systems   Constitutional:  Positive for malaise/fatigue. Negative for chills and fever.   Gastrointestinal:  Negative for abdominal pain, nausea and vomiting.   Genitourinary:  Positive for dysuria, frequency, hematuria and urgency. Negative for flank pain and hesitancy.   Musculoskeletal:  Positive for back pain (lower back pain).   Skin:  Negative for rash.   Neurological:  Negative for headaches.     All other systems reviewed and are negative.            Objective     BP  "104/62   Pulse 91   Temp 36.3 °C (97.3 °F)   Resp 14   Ht 1.6 m (5' 3\")   Wt 96.6 kg (213 lb)   LMP 12/02/2023 (Exact Date)   SpO2 97%   BMI 37.73 kg/m²      Physical Exam  Constitutional:       General: She is not in acute distress.     Appearance: She is not ill-appearing.   HENT:      Head: Normocephalic and atraumatic.   Eyes:      Conjunctiva/sclera: Conjunctivae normal.   Cardiovascular:      Rate and Rhythm: Normal rate and regular rhythm.   Pulmonary:      Effort: Pulmonary effort is normal. No respiratory distress.      Breath sounds: No stridor. No wheezing.   Abdominal:      General: There is no distension.      Palpations: Abdomen is soft. There is no mass.      Tenderness: There is no abdominal tenderness. There is no right CVA tenderness, left CVA tenderness, guarding or rebound.   Skin:     General: Skin is warm and dry.   Neurological:      General: No focal deficit present.      Mental Status: She is alert and oriented to person, place, and time.   Psychiatric:         Mood and Affect: Mood normal.         Behavior: Behavior normal.         Thought Content: Thought content normal.         Judgment: Judgment normal.                  Lab Results   Component Value Date/Time    POCCOLOR dark yellow 01/03/2024 11:07 AM    POCAPPEAR slightly clodu 01/03/2024 11:07 AM    POCLEUKEST small 01/03/2024 11:07 AM    POCNITRITE negative 01/03/2024 11:07 AM    POCUROBILIGE 0.2 01/03/2024 11:07 AM    POCPROTEIN 100 01/03/2024 11:07 AM    POCURPH 5.5 01/03/2024 11:07 AM    POCBLOOD large 01/03/2024 11:07 AM    POCSPGRV >=1.030 01/03/2024 11:07 AM    POCKETONES negative 01/03/2024 11:07 AM    POCBILIRUBIN negative 01/03/2024 11:07 AM    POCGLUCUA negative 01/03/2024 11:07 AM             Hcg- negative      Assessment & Plan        1. Urinary tract infection symptoms    - POCT Urinalysis  - POCT Pregnancy  - URINE CULTURE(NEW); Future  - nitrofurantoin (MACROBID) 100 MG Cap; Take 1 Capsule by mouth 2 times a " day for 5 days.  Dispense: 10 Capsule; Refill: 0    2. Vaginal discharge    - VAGINAL PATHOGENS DNA PANEL; Future  - Chlamydia/GC, PCR (Genital/Anal swab); Future  Check above and change treatment above accordingly.    .Differential diagnoses, Supportive care, and indications for immediate follow-up discussed with patient.   Pathogenesis of diagnosis discussed including typical length and natural progression.   Instructed to return to clinic or nearest emergency department for any change in condition, further concerns, or worsening of symptoms.      The patient demonstrated a good understanding and agreed with the treatment plan.      Alecia Driver P.A.-C.

## 2024-01-04 LAB
C TRACH DNA GENITAL QL NAA+PROBE: POSITIVE
CANDIDA DNA VAG QL PROBE+SIG AMP: NEGATIVE
G VAGINALIS DNA VAG QL PROBE+SIG AMP: POSITIVE
N GONORRHOEA DNA GENITAL QL NAA+PROBE: NEGATIVE
SPECIMEN SOURCE: ABNORMAL
T VAGINALIS DNA VAG QL PROBE+SIG AMP: POSITIVE

## 2024-01-05 ENCOUNTER — TELEPHONE (OUTPATIENT)
Dept: URGENT CARE | Facility: CLINIC | Age: 39
End: 2024-01-05

## 2024-01-05 DIAGNOSIS — N76.0 BV (BACTERIAL VAGINOSIS): ICD-10-CM

## 2024-01-05 DIAGNOSIS — A59.9 TRICHOMONIASIS: ICD-10-CM

## 2024-01-05 DIAGNOSIS — N30.00 ACUTE CYSTITIS WITHOUT HEMATURIA: ICD-10-CM

## 2024-01-05 DIAGNOSIS — A74.9 CHLAMYDIA: ICD-10-CM

## 2024-01-05 DIAGNOSIS — B96.89 BV (BACTERIAL VAGINOSIS): ICD-10-CM

## 2024-01-05 DIAGNOSIS — R39.9 URINARY TRACT INFECTION SYMPTOMS: ICD-10-CM

## 2024-01-05 LAB
BACTERIA UR CULT: ABNORMAL
BACTERIA UR CULT: ABNORMAL
SIGNIFICANT IND 70042: ABNORMAL
SITE SITE: ABNORMAL
SOURCE SOURCE: ABNORMAL

## 2024-01-05 RX ORDER — METRONIDAZOLE 500 MG/1
500 TABLET ORAL 2 TIMES DAILY
Qty: 14 TABLET | Refills: 0 | Status: SHIPPED | OUTPATIENT
Start: 2024-01-05 | End: 2024-01-12

## 2024-01-05 RX ORDER — NITROFURANTOIN 25; 75 MG/1; MG/1
100 CAPSULE ORAL 2 TIMES DAILY
Qty: 10 CAPSULE | Refills: 0 | Status: SHIPPED | OUTPATIENT
Start: 2024-01-05 | End: 2024-01-10

## 2024-01-05 RX ORDER — DOXYCYCLINE HYCLATE 100 MG
100 TABLET ORAL 2 TIMES DAILY
Qty: 14 TABLET | Refills: 0 | Status: SHIPPED | OUTPATIENT
Start: 2024-01-05 | End: 2024-01-12

## 2024-01-11 ENCOUNTER — TELEPHONE (OUTPATIENT)
Dept: HEALTH INFORMATION MANAGEMENT | Facility: OTHER | Age: 39
End: 2024-01-11
Payer: COMMERCIAL

## 2024-01-11 ENCOUNTER — DOCUMENTATION (OUTPATIENT)
Dept: HEALTH INFORMATION MANAGEMENT | Facility: OTHER | Age: 39
End: 2024-01-11
Payer: COMMERCIAL

## 2024-02-28 DIAGNOSIS — E28.2 PCOS (POLYCYSTIC OVARIAN SYNDROME): ICD-10-CM

## 2024-02-28 DIAGNOSIS — E66.9 OBESITY (BMI 35.0-39.9 WITHOUT COMORBIDITY): ICD-10-CM

## 2024-02-28 RX ORDER — SEMAGLUTIDE 2.68 MG/ML
2 INJECTION, SOLUTION SUBCUTANEOUS
Qty: 6 ML | Refills: 0 | Status: SHIPPED | OUTPATIENT
Start: 2024-02-28 | End: 2024-03-05

## 2024-02-29 ENCOUNTER — HOSPITAL ENCOUNTER (OUTPATIENT)
Dept: RADIOLOGY | Facility: MEDICAL CENTER | Age: 39
End: 2024-02-29
Attending: OTOLARYNGOLOGY
Payer: COMMERCIAL

## 2024-02-29 DIAGNOSIS — R13.14 DYSPHAGIA, PHARYNGOESOPHAGEAL PHASE: ICD-10-CM

## 2024-02-29 DIAGNOSIS — J31.0 CHRONIC RHINITIS: ICD-10-CM

## 2024-02-29 DIAGNOSIS — R07.0 THROAT PAIN: ICD-10-CM

## 2024-02-29 DIAGNOSIS — J34.89 OVERDEVELOPMENT OF NASAL BONES: ICD-10-CM

## 2024-02-29 PROCEDURE — 70491 CT SOFT TISSUE NECK W/DYE: CPT

## 2024-02-29 PROCEDURE — 700117 HCHG RX CONTRAST REV CODE 255: Performed by: OTOLARYNGOLOGY

## 2024-02-29 RX ADMIN — IOHEXOL 75 ML: 350 INJECTION, SOLUTION INTRAVENOUS at 13:27

## 2024-03-04 ENCOUNTER — HOSPITAL ENCOUNTER (OUTPATIENT)
Dept: LAB | Facility: MEDICAL CENTER | Age: 39
End: 2024-03-04
Attending: INTERNAL MEDICINE
Payer: COMMERCIAL

## 2024-03-04 DIAGNOSIS — E66.9 OBESITY (BMI 35.0-39.9 WITHOUT COMORBIDITY): ICD-10-CM

## 2024-03-04 DIAGNOSIS — E28.2 PCOS (POLYCYSTIC OVARIAN SYNDROME): ICD-10-CM

## 2024-03-04 DIAGNOSIS — E22.1 HYPERPROLACTINEMIA (HCC): ICD-10-CM

## 2024-03-04 LAB
ALBUMIN SERPL BCP-MCNC: 4 G/DL (ref 3.2–4.9)
ALBUMIN/GLOB SERPL: 1.3 G/DL
ALP SERPL-CCNC: 52 U/L (ref 30–99)
ALT SERPL-CCNC: 28 U/L (ref 2–50)
ANION GAP SERPL CALC-SCNC: 10 MMOL/L (ref 7–16)
AST SERPL-CCNC: 21 U/L (ref 12–45)
BILIRUB SERPL-MCNC: 0.6 MG/DL (ref 0.1–1.5)
BUN SERPL-MCNC: 7 MG/DL (ref 8–22)
CALCIUM ALBUM COR SERPL-MCNC: 9.1 MG/DL (ref 8.5–10.5)
CALCIUM SERPL-MCNC: 9.1 MG/DL (ref 8.5–10.5)
CHLORIDE SERPL-SCNC: 105 MMOL/L (ref 96–112)
CO2 SERPL-SCNC: 23 MMOL/L (ref 20–33)
CREAT SERPL-MCNC: 0.6 MG/DL (ref 0.5–1.4)
GFR SERPLBLD CREATININE-BSD FMLA CKD-EPI: 117 ML/MIN/1.73 M 2
GLOBULIN SER CALC-MCNC: 3 G/DL (ref 1.9–3.5)
GLUCOSE SERPL-MCNC: 100 MG/DL (ref 65–99)
POTASSIUM SERPL-SCNC: 3.9 MMOL/L (ref 3.6–5.5)
PROLACTIN SERPL-MCNC: 16.8 NG/ML (ref 2.8–26)
PROT SERPL-MCNC: 7 G/DL (ref 6–8.2)
SODIUM SERPL-SCNC: 138 MMOL/L (ref 135–145)

## 2024-03-04 PROCEDURE — 84270 ASSAY OF SEX HORMONE GLOBUL: CPT

## 2024-03-04 PROCEDURE — 84403 ASSAY OF TOTAL TESTOSTERONE: CPT

## 2024-03-04 PROCEDURE — 84402 ASSAY OF FREE TESTOSTERONE: CPT

## 2024-03-04 PROCEDURE — 84146 ASSAY OF PROLACTIN: CPT

## 2024-03-04 PROCEDURE — 80053 COMPREHEN METABOLIC PANEL: CPT

## 2024-03-04 PROCEDURE — 36415 COLL VENOUS BLD VENIPUNCTURE: CPT

## 2024-03-05 ENCOUNTER — TELEMEDICINE (OUTPATIENT)
Dept: ENDOCRINOLOGY | Facility: MEDICAL CENTER | Age: 39
End: 2024-03-05
Attending: INTERNAL MEDICINE
Payer: COMMERCIAL

## 2024-03-05 VITALS — BODY MASS INDEX: 37.74 KG/M2 | WEIGHT: 213 LBS | HEIGHT: 63 IN

## 2024-03-05 DIAGNOSIS — E66.9 OBESITY (BMI 35.0-39.9 WITHOUT COMORBIDITY): ICD-10-CM

## 2024-03-05 DIAGNOSIS — E22.1 HYPERPROLACTINEMIA (HCC): ICD-10-CM

## 2024-03-05 DIAGNOSIS — E28.2 PCOS (POLYCYSTIC OVARIAN SYNDROME): ICD-10-CM

## 2024-03-05 PROCEDURE — 99215 OFFICE O/P EST HI 40 MIN: CPT | Mod: 95 | Performed by: INTERNAL MEDICINE

## 2024-03-05 RX ORDER — TIRZEPATIDE 2.5 MG/.5ML
2.5 INJECTION, SOLUTION SUBCUTANEOUS
Qty: 2 ML | Refills: 1 | Status: SHIPPED | OUTPATIENT
Start: 2024-03-05

## 2024-03-05 RX ORDER — METFORMIN HYDROCHLORIDE 500 MG/1
1000 TABLET, EXTENDED RELEASE ORAL 2 TIMES DAILY
Qty: 360 TABLET | Refills: 3 | Status: SHIPPED | OUTPATIENT
Start: 2024-03-05

## 2024-03-05 ASSESSMENT — FIBROSIS 4 INDEX: FIB4 SCORE: 0.58

## 2024-03-05 NOTE — PROGRESS NOTES
Chief Complaint: Follow up for hyperprolactinemia that is idiopathic, PCOS and obesity.  Patient was presented for a telehealth consultation via secure and encrypted videoconferencing technology.   This encounter was conducted via Zoom . Verbal consent was obtained. Patient's identity was verified.    Virtual visit consent       This evaluation was conducted via Zoom using secure and encrypted videoconferencing technology.   The patient was (home or other private:[id number]) in the [facility].   The patient's identity was confirmed and verbal consent was obtained for this virtual visit.         HPI:     Naomi Peters is a 38 y.o. female here for follow up of the above medical issues    She was found to have elevated prolactin levels at 20 on April 2023 associate with normal TSH levels.  PCOS was diagnosed by another provider years ago and she was treated with Ozempic and I added metformin extended release which she was able to tolerate.  She previously tried and failed generic metformin immediate release due to nausea.    She was also previously taking Inositol for PCOS      Baseline pituitary MRI on June 2023 for the prolactin elevation showed no focal adenoma      Her baseline workup for PCOS showed elevated free and total testosterone levels of 75 and 10 respectively on May 2023, IGF-I levels were normal prolactin was found to be elevated again at 28, midnight saliva cortisol x 3 on May 16, 2023 was normal at 0.015, 0.026 and 0.070, DHEA-sulfate was normal at 130, and 17 hydroxyprogesterone was not tested.    On follow-up she reports that she has done well in terms of PCOS  She has \Nexplanon implant and denies abnormal bleeding  She denies severe hirsutism  She is tolerating metformin extended release without side effects  She is still on Ozempic 2 mg weekly but reports that weight loss has subsided    She is interested in trying a different medication    Her most recent prolactin is normal at 16 on  March 2024 and total and free testosterone levels are still pending        Patient's medications, allergies, and social histories were reviewed and updated as appropriate.      ROS:     CONS:     No fever, no chills   EYES:     No diplopia, no blurry vision   CV:           No chest pain, no palpitations   PULM:     No SOB, no cough, no hemoptysis.   GI:            No nausea, no vomiting, no diarrhea, no constipation   ENDO:     No polyuria, no polydipsia, no heat intolerance, no cold intolerance       Past Medical History:  Problem List:  2023-11: ASCUS with positive high risk HPV cervical  2023-08: Tear of medial meniscus of knee  2023-05: Hyperprolactinemia (HCC)  2021-08: Pharyngitis due to Streptococcus species  2021-07: Attention deficit hyperactivity disorder (ADHD), inattentive   type, mild  2020-11: Prediabetes  2020-10: Concentration deficit  2019-05: BRCA gene mutation negative  2019-04: Family history of systemic lupus erythematosus  2019-04: MDD (major depressive disorder), recurrent, in partial   remission (HCC)  2019-04: Type 2 diabetes mellitus (HCC)  2019-04: Family history of ovarian cancer  2019-04: Family history of breast cancer  2019-04: Nonintractable migraine  2019-04: Positive TB test  2018-06: Obesity (BMI 35.0-39.9 without comorbidity)  2018-02: SANDI (generalized anxiety disorder)  2018-02: PCOS (polycystic ovarian syndrome)  2018-02: Eczema  2018-02: Anxiety  2018-02: Chronic low back pain      Past Surgical History:  Past Surgical History:   Procedure Laterality Date    OTHER      back surgery        Allergies:  Cat hair extract     Social History:  Social History     Tobacco Use    Smoking status: Never    Smokeless tobacco: Never   Vaping Use    Vaping Use: Never used   Substance Use Topics    Alcohol use: Yes     Comment: rarely, one drink every 2 months, social    Drug use: No        Family History:   family history includes Alcohol abuse in her brother and father; Anxiety disorder in  "her brother and mother; Cancer in her mother, paternal aunt, and paternal grandmother; Colon Cancer in her maternal aunt; Depression in her brother and mother; Diabetes in her mother; Heart Disease in her maternal grandmother; Schizophrenia in her brother.      PHYSICAL EXAM:   Vital signs: Ht 1.6 m (5' 3\")   Wt 96.6 kg (213 lb)   BMI 37.73 kg/m²   GENERAL: Well-developed, well-nourished in no apparent distress.   EYE:  No ocular asymmetry, PERRLA  HENT: Pink, moist mucous membranes.    NECK: No thyromegaly.   CARDIOVASCULAR:  No murmurs  LUNGS: Clear breath sounds  ABDOMEN: Soft, nontender   EXTREMITIES: No clubbing, cyanosis, or edema.   NEUROLOGICAL: No gross focal motor abnormalities   LYMPH: No cervical adenopathy seen  SKIN: No rashes, lesions.       Labs:  Lab Results   Component Value Date/Time    SODIUM 138 03/04/2024 07:52 AM    POTASSIUM 3.9 03/04/2024 07:52 AM    CHLORIDE 105 03/04/2024 07:52 AM    CO2 23 03/04/2024 07:52 AM    ANION 10.0 03/04/2024 07:52 AM    GLUCOSE 100 (H) 03/04/2024 07:52 AM    BUN 7 (L) 03/04/2024 07:52 AM    CREATININE 0.60 03/04/2024 07:52 AM    CALCIUM 9.1 03/04/2024 07:52 AM    ASTSGOT 21 03/04/2024 07:52 AM    ALTSGPT 28 03/04/2024 07:52 AM    TBILIRUBIN 0.6 03/04/2024 07:52 AM    ALBUMIN 4.0 03/04/2024 07:52 AM    TOTPROTEIN 7.0 03/04/2024 07:52 AM    GLOBULIN 3.0 03/04/2024 07:52 AM    AGRATIO 1.3 03/04/2024 07:52 AM       Lab Results   Component Value Date/Time    SODIUM 138 03/04/2024 0752    POTASSIUM 3.9 03/04/2024 0752    CHLORIDE 105 03/04/2024 0752    CO2 23 03/04/2024 0752    GLUCOSE 100 (H) 03/04/2024 0752    BUN 7 (L) 03/04/2024 0752    CREATININE 0.60 03/04/2024 0752    CALCIUM 9.1 03/04/2024 0752    ANION 10.0 03/04/2024 0752       Lab Results   Component Value Date/Time    CHOLSTRLTOT 156 05/25/2022 0809    TRIGLYCERIDE 77 05/25/2022 0809    HDL 46 05/25/2022 0809    LDL 95 05/25/2022 0809       Lab Results   Component Value Date/Time    TSHULTRASEN 1.000 " "04/27/2023 0738     No results found for: \"FREET4\"  No results found for: \"FREET3\"  No results found for: \"THYSTIMIG\"    No results found for: \"MICROSOMALA\"      Imaging:      ASSESSMENT/PLAN:     1. Hyperprolactinemia (HCC)  Resolved.  She had idiopathic hyperprolactinemia most likely because of stress.  Pituitary MRI was negative and prolactin never went up above 40.  Recommend observation for now    2. Obesity (BMI 35.0-39.9 without comorbidity)  Unstable she has obesity with a BMI greater than 30.  She has tried and failed diet and exercise.  Workup for hypercortisolism was negative midnight saliva cortisol x 3 was normal.  Thyroid function is normal.  Stop Ozempic  Start Ozempic down 2.5 mg weekly and increase every month if tolerated  Reviewed side effects of medication    3. PCOS (polycystic ovarian syndrome)  Stable  Increase metformin to 1000 mg twice a day extended release  Will follow-up on testosterone levels      Return in about 6 months (around 9/5/2024).      Total time spent on day of service was over 60 minutes which included obtaining a detailed history and physical exam, ordering labs, coordinating care and scheduling future follow-up  We reviewed her previous workup and also current labs and recent MRI  We discussed the new medication for obesity    This patient during there office visit today was started on a new medication.  Side effects of the new medication were discussed with the patient today in the office.     Thank you kindly for allowing me to participate in the thyroid care plan for this patient.    Guido Keating MD, A[facility], [facility], [facility]  03/05/24    CC:   Pcp Pt States None    "

## 2024-03-06 LAB
SHBG SERPL-SCNC: 56 NMOL/L (ref 25–122)
TESTOST FREE MFR SERPL: 1.2 %
TESTOST FREE SERPL-MCNC: 5 PG/ML
TESTOST SERPL-MCNC: 42 NG/DL

## 2024-03-10 DIAGNOSIS — E28.2 PCOS (POLYCYSTIC OVARIAN SYNDROME): ICD-10-CM

## 2024-03-10 DIAGNOSIS — E66.9 OBESITY (BMI 35.0-39.9 WITHOUT COMORBIDITY): ICD-10-CM

## 2024-03-11 ENCOUNTER — HOSPITAL ENCOUNTER (OUTPATIENT)
Dept: RADIOLOGY | Facility: MEDICAL CENTER | Age: 39
End: 2024-03-11
Attending: OTOLARYNGOLOGY
Payer: COMMERCIAL

## 2024-03-11 DIAGNOSIS — J34.89 OVERDEVELOPMENT OF NASAL BONES: ICD-10-CM

## 2024-03-11 DIAGNOSIS — R13.14 DYSPHAGIA, PHARYNGOESOPHAGEAL PHASE: ICD-10-CM

## 2024-03-11 DIAGNOSIS — E28.2 PCOS (POLYCYSTIC OVARIAN SYNDROME): ICD-10-CM

## 2024-03-11 DIAGNOSIS — R07.0 THROAT PAIN: ICD-10-CM

## 2024-03-11 DIAGNOSIS — J31.0 CHRONIC RHINITIS: ICD-10-CM

## 2024-03-11 DIAGNOSIS — E66.9 OBESITY (BMI 35.0-39.9 WITHOUT COMORBIDITY): ICD-10-CM

## 2024-03-11 PROCEDURE — 700117 HCHG RX CONTRAST REV CODE 255: Performed by: OTOLARYNGOLOGY

## 2024-03-11 PROCEDURE — 74220 X-RAY XM ESOPHAGUS 1CNTRST: CPT

## 2024-03-11 RX ORDER — SEMAGLUTIDE 2.68 MG/ML
INJECTION, SOLUTION SUBCUTANEOUS
Qty: 3 ML | Refills: 6 | Status: SHIPPED | OUTPATIENT
Start: 2024-03-11 | End: 2024-03-11

## 2024-03-11 RX ORDER — SEMAGLUTIDE 2.68 MG/ML
INJECTION, SOLUTION SUBCUTANEOUS
Qty: 3 ML | Refills: 6 | Status: CANCELLED | OUTPATIENT
Start: 2024-03-11

## 2024-03-11 RX ORDER — SEMAGLUTIDE 2.68 MG/ML
INJECTION, SOLUTION SUBCUTANEOUS
Qty: 3 ML | Refills: 6 | Status: SHIPPED | OUTPATIENT
Start: 2024-03-11

## 2024-03-11 RX ADMIN — BARIUM SULFATE 700 MG: 700 TABLET ORAL at 15:05

## 2024-03-11 NOTE — TELEPHONE ENCOUNTER
Patient wants to be put back on ozempic because Zepbound is not covered by insurance,   Patient would like it sent to the [facility] on [facility]

## 2024-03-14 ENCOUNTER — APPOINTMENT (OUTPATIENT)
Dept: MEDICAL GROUP | Facility: MEDICAL CENTER | Age: 39
End: 2024-03-14
Payer: COMMERCIAL

## 2024-03-14 SDOH — ECONOMIC STABILITY: INCOME INSECURITY: IN THE LAST 12 MONTHS, WAS THERE A TIME WHEN YOU WERE NOT ABLE TO PAY THE MORTGAGE OR RENT ON TIME?: YES

## 2024-03-14 SDOH — ECONOMIC STABILITY: TRANSPORTATION INSECURITY
IN THE PAST 12 MONTHS, HAS LACK OF TRANSPORTATION KEPT YOU FROM MEETINGS, WORK, OR FROM GETTING THINGS NEEDED FOR DAILY LIVING?: NO

## 2024-03-14 SDOH — ECONOMIC STABILITY: FOOD INSECURITY: WITHIN THE PAST 12 MONTHS, YOU WORRIED THAT YOUR FOOD WOULD RUN OUT BEFORE YOU GOT MONEY TO BUY MORE.: NEVER TRUE

## 2024-03-14 SDOH — HEALTH STABILITY: PHYSICAL HEALTH: ON AVERAGE, HOW MANY DAYS PER WEEK DO YOU ENGAGE IN MODERATE TO STRENUOUS EXERCISE (LIKE A BRISK WALK)?: 2 DAYS

## 2024-03-14 SDOH — ECONOMIC STABILITY: HOUSING INSECURITY: IN THE LAST 12 MONTHS, HOW MANY PLACES HAVE YOU LIVED?: 1

## 2024-03-14 SDOH — ECONOMIC STABILITY: FOOD INSECURITY: WITHIN THE PAST 12 MONTHS, THE FOOD YOU BOUGHT JUST DIDN'T LAST AND YOU DIDN'T HAVE MONEY TO GET MORE.: PATIENT DECLINED

## 2024-03-14 SDOH — HEALTH STABILITY: PHYSICAL HEALTH: ON AVERAGE, HOW MANY MINUTES DO YOU ENGAGE IN EXERCISE AT THIS LEVEL?: 30 MIN

## 2024-03-14 SDOH — HEALTH STABILITY: MENTAL HEALTH
STRESS IS WHEN SOMEONE FEELS TENSE, NERVOUS, ANXIOUS, OR CAN'T SLEEP AT NIGHT BECAUSE THEIR MIND IS TROUBLED. HOW STRESSED ARE YOU?: TO SOME EXTENT

## 2024-03-14 SDOH — ECONOMIC STABILITY: INCOME INSECURITY: HOW HARD IS IT FOR YOU TO PAY FOR THE VERY BASICS LIKE FOOD, HOUSING, MEDICAL CARE, AND HEATING?: PATIENT DECLINED

## 2024-03-14 ASSESSMENT — SOCIAL DETERMINANTS OF HEALTH (SDOH)
HOW HARD IS IT FOR YOU TO PAY FOR THE VERY BASICS LIKE FOOD, HOUSING, MEDICAL CARE, AND HEATING?: PATIENT DECLINED
DO YOU BELONG TO ANY CLUBS OR ORGANIZATIONS SUCH AS CHURCH GROUPS UNIONS, FRATERNAL OR ATHLETIC GROUPS, OR SCHOOL GROUPS?: NO
HOW OFTEN DO YOU HAVE SIX OR MORE DRINKS ON ONE OCCASION: LESS THAN MONTHLY
HOW OFTEN DO YOU ATTEND CHURCH OR RELIGIOUS SERVICES?: MORE THAN 4 TIMES PER YEAR
DO YOU BELONG TO ANY CLUBS OR ORGANIZATIONS SUCH AS CHURCH GROUPS UNIONS, FRATERNAL OR ATHLETIC GROUPS, OR SCHOOL GROUPS?: NO
HOW OFTEN DO YOU HAVE A DRINK CONTAINING ALCOHOL: 2-4 TIMES A MONTH
HOW OFTEN DO YOU ATTENT MEETINGS OF THE CLUB OR ORGANIZATION YOU BELONG TO?: PATIENT DECLINED
WITHIN THE PAST 12 MONTHS, YOU WORRIED THAT YOUR FOOD WOULD RUN OUT BEFORE YOU GOT THE MONEY TO BUY MORE: NEVER TRUE
HOW OFTEN DO YOU ATTENT MEETINGS OF THE CLUB OR ORGANIZATION YOU BELONG TO?: PATIENT DECLINED
HOW OFTEN DO YOU GET TOGETHER WITH FRIENDS OR RELATIVES?: TWICE A WEEK
HOW OFTEN DO YOU GET TOGETHER WITH FRIENDS OR RELATIVES?: TWICE A WEEK
IN A TYPICAL WEEK, HOW MANY TIMES DO YOU TALK ON THE PHONE WITH FAMILY, FRIENDS, OR NEIGHBORS?: THREE TIMES A WEEK
HOW MANY DRINKS CONTAINING ALCOHOL DO YOU HAVE ON A TYPICAL DAY WHEN YOU ARE DRINKING: 3 OR 4
HOW OFTEN DO YOU ATTEND CHURCH OR RELIGIOUS SERVICES?: MORE THAN 4 TIMES PER YEAR
IN A TYPICAL WEEK, HOW MANY TIMES DO YOU TALK ON THE PHONE WITH FAMILY, FRIENDS, OR NEIGHBORS?: THREE TIMES A WEEK

## 2024-03-14 ASSESSMENT — LIFESTYLE VARIABLES
HOW OFTEN DO YOU HAVE A DRINK CONTAINING ALCOHOL: 2-4 TIMES A MONTH
HOW MANY STANDARD DRINKS CONTAINING ALCOHOL DO YOU HAVE ON A TYPICAL DAY: 3 OR 4
HOW OFTEN DO YOU HAVE SIX OR MORE DRINKS ON ONE OCCASION: LESS THAN MONTHLY
AUDIT-C TOTAL SCORE: 4
SKIP TO QUESTIONS 9-10: 0

## 2024-03-14 NOTE — PROGRESS NOTES
Subjective:     CC: ***    HPI:   Naomi presents today with    PMH of MDD/ anxiety/ adhd, prediabetes on metformin, elevated BMI, fhx of breast/ovarian/SLE, BRCA negative    ? Hyperprolactin     No problems updated.    Health Maintenance: {COMPLETED:401242}    ROS:  ROS    Objective:     Exam:  There were no vitals taken for this visit. There is no height or weight on file to calculate BMI.    Physical Exam    {A chaperone was offered to the patient during today's exam.:[id number]}    Labs: ***    Assessment & Plan:     38 y.o. female with the following -     ***        Referral for genetic research was offered. Patient {declined/accepted}.    I spent a total of *** minutes with record review, exam, communication with the patient, communication with other providers, and documentation of this encounter.      No follow-ups on file.    Please note that this dictation was created using voice recognition software. I have made every reasonable attempt to correct obvious errors, but I expect that there are errors of grammar and possibly content that I did not discover before finalizing the note.

## 2024-04-05 ENCOUNTER — APPOINTMENT (OUTPATIENT)
Dept: ADMISSIONS | Facility: MEDICAL CENTER | Age: 39
End: 2024-04-05
Attending: OTOLARYNGOLOGY
Payer: COMMERCIAL

## 2024-04-08 ENCOUNTER — PRE-ADMISSION TESTING (OUTPATIENT)
Dept: ADMISSIONS | Facility: MEDICAL CENTER | Age: 39
End: 2024-04-08
Attending: OTOLARYNGOLOGY
Payer: COMMERCIAL

## 2024-04-08 RX ORDER — LAMOTRIGINE 25 MG/1
50 TABLET ORAL DAILY
COMMUNITY
Start: 2024-02-01

## 2024-04-08 RX ORDER — VENLAFAXINE HYDROCHLORIDE 225 MG/1
225 TABLET, EXTENDED RELEASE ORAL DAILY
COMMUNITY
Start: 2024-02-28

## 2024-04-24 ENCOUNTER — ANESTHESIA EVENT (OUTPATIENT)
Dept: SURGERY | Facility: MEDICAL CENTER | Age: 39
End: 2024-04-24
Payer: COMMERCIAL

## 2024-04-25 ENCOUNTER — HOSPITAL ENCOUNTER (OUTPATIENT)
Facility: MEDICAL CENTER | Age: 39
End: 2024-04-25
Attending: OTOLARYNGOLOGY | Admitting: OTOLARYNGOLOGY
Payer: COMMERCIAL

## 2024-04-25 ENCOUNTER — ANESTHESIA (OUTPATIENT)
Dept: SURGERY | Facility: MEDICAL CENTER | Age: 39
End: 2024-04-25
Payer: COMMERCIAL

## 2024-04-25 VITALS
HEIGHT: 63 IN | DIASTOLIC BLOOD PRESSURE: 72 MMHG | BODY MASS INDEX: 34.18 KG/M2 | TEMPERATURE: 97.6 F | SYSTOLIC BLOOD PRESSURE: 113 MMHG | RESPIRATION RATE: 16 BRPM | WEIGHT: 192.9 LBS | OXYGEN SATURATION: 95 % | HEART RATE: 96 BPM

## 2024-04-25 DIAGNOSIS — J34.2 NASAL SEPTAL DEVIATION: ICD-10-CM

## 2024-04-25 LAB — HCG UR QL: NEGATIVE

## 2024-04-25 PROCEDURE — A9270 NON-COVERED ITEM OR SERVICE: HCPCS | Performed by: OTOLARYNGOLOGY

## 2024-04-25 PROCEDURE — 160025 RECOVERY II MINUTES (STATS): Performed by: OTOLARYNGOLOGY

## 2024-04-25 PROCEDURE — 700111 HCHG RX REV CODE 636 W/ 250 OVERRIDE (IP): Performed by: OTOLARYNGOLOGY

## 2024-04-25 PROCEDURE — 160047 HCHG PACU  - EA ADDL 30 MINS PHASE II: Performed by: OTOLARYNGOLOGY

## 2024-04-25 PROCEDURE — 160028 HCHG SURGERY MINUTES - 1ST 30 MINS LEVEL 3: Performed by: OTOLARYNGOLOGY

## 2024-04-25 PROCEDURE — A9270 NON-COVERED ITEM OR SERVICE: HCPCS | Performed by: ANESTHESIOLOGY

## 2024-04-25 PROCEDURE — 160009 HCHG ANES TIME/MIN: Performed by: OTOLARYNGOLOGY

## 2024-04-25 PROCEDURE — 160046 HCHG PACU - 1ST 60 MINS PHASE II: Performed by: OTOLARYNGOLOGY

## 2024-04-25 PROCEDURE — 700102 HCHG RX REV CODE 250 W/ 637 OVERRIDE(OP): Performed by: OTOLARYNGOLOGY

## 2024-04-25 PROCEDURE — 700101 HCHG RX REV CODE 250: Performed by: ANESTHESIOLOGY

## 2024-04-25 PROCEDURE — 160039 HCHG SURGERY MINUTES - EA ADDL 1 MIN LEVEL 3: Performed by: OTOLARYNGOLOGY

## 2024-04-25 PROCEDURE — 700102 HCHG RX REV CODE 250 W/ 637 OVERRIDE(OP): Performed by: ANESTHESIOLOGY

## 2024-04-25 PROCEDURE — 700105 HCHG RX REV CODE 258: Performed by: OTOLARYNGOLOGY

## 2024-04-25 PROCEDURE — 81025 URINE PREGNANCY TEST: CPT

## 2024-04-25 PROCEDURE — 700111 HCHG RX REV CODE 636 W/ 250 OVERRIDE (IP): Mod: JZ | Performed by: ANESTHESIOLOGY

## 2024-04-25 PROCEDURE — 160002 HCHG RECOVERY MINUTES (STAT): Performed by: OTOLARYNGOLOGY

## 2024-04-25 PROCEDURE — 700111 HCHG RX REV CODE 636 W/ 250 OVERRIDE (IP): Performed by: ANESTHESIOLOGY

## 2024-04-25 PROCEDURE — 700101 HCHG RX REV CODE 250: Performed by: OTOLARYNGOLOGY

## 2024-04-25 PROCEDURE — 160035 HCHG PACU - 1ST 60 MINS PHASE I: Performed by: OTOLARYNGOLOGY

## 2024-04-25 PROCEDURE — 160048 HCHG OR STATISTICAL LEVEL 1-5: Performed by: OTOLARYNGOLOGY

## 2024-04-25 RX ORDER — LIDOCAINE HYDROCHLORIDE 40 MG/ML
SOLUTION TOPICAL PRN
Status: DISCONTINUED | OUTPATIENT
Start: 2024-04-25 | End: 2024-04-25 | Stop reason: SURG

## 2024-04-25 RX ORDER — ROCURONIUM BROMIDE 10 MG/ML
INJECTION, SOLUTION INTRAVENOUS PRN
Status: DISCONTINUED | OUTPATIENT
Start: 2024-04-25 | End: 2024-04-25 | Stop reason: SURG

## 2024-04-25 RX ORDER — HYDRALAZINE HYDROCHLORIDE 20 MG/ML
5 INJECTION INTRAMUSCULAR; INTRAVENOUS
Status: DISCONTINUED | OUTPATIENT
Start: 2024-04-25 | End: 2024-04-25 | Stop reason: HOSPADM

## 2024-04-25 RX ORDER — ONDANSETRON 2 MG/ML
4 INJECTION INTRAMUSCULAR; INTRAVENOUS
Status: DISCONTINUED | OUTPATIENT
Start: 2024-04-25 | End: 2024-04-25 | Stop reason: HOSPADM

## 2024-04-25 RX ORDER — LIDOCAINE HYDROCHLORIDE 20 MG/ML
INJECTION, SOLUTION EPIDURAL; INFILTRATION; INTRACAUDAL; PERINEURAL PRN
Status: DISCONTINUED | OUTPATIENT
Start: 2024-04-25 | End: 2024-04-25 | Stop reason: SURG

## 2024-04-25 RX ORDER — HALOPERIDOL 5 MG/ML
1 INJECTION INTRAMUSCULAR
Status: DISCONTINUED | OUTPATIENT
Start: 2024-04-25 | End: 2024-04-25 | Stop reason: HOSPADM

## 2024-04-25 RX ORDER — CEPHALEXIN 500 MG/1
500 CAPSULE ORAL 3 TIMES DAILY
Qty: 21 CAPSULE | Refills: 0 | Status: SHIPPED | OUTPATIENT
Start: 2024-04-25 | End: 2024-05-02

## 2024-04-25 RX ORDER — MEPERIDINE HYDROCHLORIDE 25 MG/ML
6.25 INJECTION INTRAMUSCULAR; INTRAVENOUS; SUBCUTANEOUS
Status: DISCONTINUED | OUTPATIENT
Start: 2024-04-25 | End: 2024-04-25 | Stop reason: HOSPADM

## 2024-04-25 RX ORDER — SODIUM CHLORIDE, SODIUM LACTATE, POTASSIUM CHLORIDE, CALCIUM CHLORIDE 600; 310; 30; 20 MG/100ML; MG/100ML; MG/100ML; MG/100ML
INJECTION, SOLUTION INTRAVENOUS CONTINUOUS
Status: ACTIVE | OUTPATIENT
Start: 2024-04-25 | End: 2024-04-25

## 2024-04-25 RX ORDER — LABETALOL HYDROCHLORIDE 5 MG/ML
5 INJECTION, SOLUTION INTRAVENOUS
Status: DISCONTINUED | OUTPATIENT
Start: 2024-04-25 | End: 2024-04-25 | Stop reason: HOSPADM

## 2024-04-25 RX ORDER — OXYCODONE HCL 5 MG/5 ML
10 SOLUTION, ORAL ORAL
Status: COMPLETED | OUTPATIENT
Start: 2024-04-25 | End: 2024-04-25

## 2024-04-25 RX ORDER — SODIUM CHLORIDE, SODIUM LACTATE, POTASSIUM CHLORIDE, CALCIUM CHLORIDE 600; 310; 30; 20 MG/100ML; MG/100ML; MG/100ML; MG/100ML
INJECTION, SOLUTION INTRAVENOUS CONTINUOUS
Status: DISCONTINUED | OUTPATIENT
Start: 2024-04-25 | End: 2024-04-25 | Stop reason: HOSPADM

## 2024-04-25 RX ORDER — DEXAMETHASONE SODIUM PHOSPHATE 4 MG/ML
INJECTION, SOLUTION INTRA-ARTICULAR; INTRALESIONAL; INTRAMUSCULAR; INTRAVENOUS; SOFT TISSUE PRN
Status: DISCONTINUED | OUTPATIENT
Start: 2024-04-25 | End: 2024-04-25 | Stop reason: SURG

## 2024-04-25 RX ORDER — BACITRACIN ZINC 500 [USP'U]/G
OINTMENT TOPICAL
Status: DISCONTINUED | OUTPATIENT
Start: 2024-04-25 | End: 2024-04-25 | Stop reason: HOSPADM

## 2024-04-25 RX ORDER — CELECOXIB 200 MG/1
200 CAPSULE ORAL ONCE
Status: COMPLETED | OUTPATIENT
Start: 2024-04-25 | End: 2024-04-25

## 2024-04-25 RX ORDER — LIDOCAINE HYDROCHLORIDE AND EPINEPHRINE 10; 10 MG/ML; UG/ML
INJECTION, SOLUTION INFILTRATION; PERINEURAL
Status: DISCONTINUED | OUTPATIENT
Start: 2024-04-25 | End: 2024-04-25 | Stop reason: HOSPADM

## 2024-04-25 RX ORDER — EPINEPHRINE 1 MG/ML(1)
AMPUL (ML) INJECTION
Status: DISCONTINUED | OUTPATIENT
Start: 2024-04-25 | End: 2024-04-25 | Stop reason: HOSPADM

## 2024-04-25 RX ORDER — ONDANSETRON 2 MG/ML
INJECTION INTRAMUSCULAR; INTRAVENOUS PRN
Status: DISCONTINUED | OUTPATIENT
Start: 2024-04-25 | End: 2024-04-25 | Stop reason: SURG

## 2024-04-25 RX ORDER — DIPHENHYDRAMINE HYDROCHLORIDE 50 MG/ML
12.5 INJECTION INTRAMUSCULAR; INTRAVENOUS
Status: DISCONTINUED | OUTPATIENT
Start: 2024-04-25 | End: 2024-04-25 | Stop reason: HOSPADM

## 2024-04-25 RX ORDER — MIDAZOLAM HYDROCHLORIDE 1 MG/ML
INJECTION INTRAMUSCULAR; INTRAVENOUS PRN
Status: DISCONTINUED | OUTPATIENT
Start: 2024-04-25 | End: 2024-04-25 | Stop reason: SURG

## 2024-04-25 RX ORDER — CEFAZOLIN SODIUM 1 G/3ML
INJECTION, POWDER, FOR SOLUTION INTRAMUSCULAR; INTRAVENOUS PRN
Status: DISCONTINUED | OUTPATIENT
Start: 2024-04-25 | End: 2024-04-25 | Stop reason: SURG

## 2024-04-25 RX ORDER — ACETAMINOPHEN 500 MG
1000 TABLET ORAL ONCE
Status: COMPLETED | OUTPATIENT
Start: 2024-04-25 | End: 2024-04-25

## 2024-04-25 RX ORDER — OXYCODONE HCL 5 MG/5 ML
5 SOLUTION, ORAL ORAL
Status: COMPLETED | OUTPATIENT
Start: 2024-04-25 | End: 2024-04-25

## 2024-04-25 RX ADMIN — SUGAMMADEX 200 MG: 100 INJECTION, SOLUTION INTRAVENOUS at 08:41

## 2024-04-25 RX ADMIN — FENTANYL CITRATE 50 MCG: 50 INJECTION, SOLUTION INTRAMUSCULAR; INTRAVENOUS at 08:11

## 2024-04-25 RX ADMIN — FENTANYL CITRATE 25 MCG: 50 INJECTION, SOLUTION INTRAMUSCULAR; INTRAVENOUS at 09:30

## 2024-04-25 RX ADMIN — ONDANSETRON 4 MG: 2 INJECTION INTRAMUSCULAR; INTRAVENOUS at 07:59

## 2024-04-25 RX ADMIN — OXYCODONE HYDROCHLORIDE 5 MG: 5 SOLUTION ORAL at 09:30

## 2024-04-25 RX ADMIN — CEFAZOLIN 2 G: 1 INJECTION, POWDER, FOR SOLUTION INTRAMUSCULAR; INTRAVENOUS at 07:57

## 2024-04-25 RX ADMIN — OXYCODONE HYDROCHLORIDE 5 MG: 5 SOLUTION ORAL at 10:15

## 2024-04-25 RX ADMIN — PROPOFOL 200 MG: 10 INJECTION, EMULSION INTRAVENOUS at 07:47

## 2024-04-25 RX ADMIN — ACETAMINOPHEN 1000 MG: 500 TABLET, FILM COATED ORAL at 07:26

## 2024-04-25 RX ADMIN — LIDOCAINE HYDROCHLORIDE 60 MG: 20 INJECTION, SOLUTION EPIDURAL; INFILTRATION; INTRACAUDAL at 07:47

## 2024-04-25 RX ADMIN — CELECOXIB 200 MG: 200 CAPSULE ORAL at 07:26

## 2024-04-25 RX ADMIN — SODIUM CHLORIDE, POTASSIUM CHLORIDE, SODIUM LACTATE AND CALCIUM CHLORIDE: 600; 310; 30; 20 INJECTION, SOLUTION INTRAVENOUS at 07:27

## 2024-04-25 RX ADMIN — FENTANYL CITRATE 50 MCG: 50 INJECTION, SOLUTION INTRAMUSCULAR; INTRAVENOUS at 07:47

## 2024-04-25 RX ADMIN — MIDAZOLAM HYDROCHLORIDE 1 MG: 1 INJECTION, SOLUTION INTRAMUSCULAR; INTRAVENOUS at 07:46

## 2024-04-25 RX ADMIN — ROCURONIUM BROMIDE 50 MG: 50 INJECTION, SOLUTION INTRAVENOUS at 07:47

## 2024-04-25 RX ADMIN — LIDOCAINE HYDROCHLORIDE 4 ML: 40 SOLUTION TOPICAL at 07:57

## 2024-04-25 RX ADMIN — DEXAMETHASONE SODIUM PHOSPHATE 8 MG: 4 INJECTION INTRA-ARTICULAR; INTRALESIONAL; INTRAMUSCULAR; INTRAVENOUS; SOFT TISSUE at 07:59

## 2024-04-25 ASSESSMENT — FIBROSIS 4 INDEX
FIB4 SCORE: 0.58
FIB4 SCORE: 0.58

## 2024-04-25 ASSESSMENT — PAIN SCALES - GENERAL: PAIN_LEVEL: 2

## 2024-04-25 ASSESSMENT — PAIN DESCRIPTION - PAIN TYPE
TYPE: SURGICAL PAIN
TYPE: SURGICAL PAIN

## 2024-04-25 NOTE — OP REPORT
DATE OF SERVICE:  04/25/2024     PREOPERATIVE DIAGNOSES:  1.  Sore throat.  2.  Septal deviation.  3.  Bilateral inferior turbinate hypertrophy.     POSTOPERATIVE DIAGNOSES:  1.  Sore throat.  2.  Septal deviation.  3.  Bilateral inferior turbinate hypertrophy.     PROCEDURES:  1.  Direct laryngoscopy.  2.  Septoplasty.  3.  Bilateral inferior turbinate partial resection.     SURGEON:  John Mercer MD     ASSISTANT:  None.     ANESTHESIA:  General endotracheal.     ANESTHESIOLOGIST:  Estee Roberts MD     ESTIMATED BLOOD LOSS:  25 mL.     COMPLICATIONS:  None.     SPECIMENS:  None.     INDICATIONS:  A 38-year-old who has had a longstanding right-sided   odynophagia.  Flexible laryngoscopy, CT neck were normal other than a septal   deviation.  Barium swallow showed reflux.  I have discussed the risks,   benefits and alternatives and elected to undergo the above procedure.     FINDINGS:  The septum was deviated to the right.  Both inferior turbinates   were enlarged.  Direct laryngoscopy was normal.     DESCRIPTION OF PROCEDURE:  The patient was brought to the operating room and   intubated via a 6.5 endotracheal tube, turned 90 degrees, draped in sterile   fashion.  Timeout was performed.  Preoperative antibiotics were given.  A   tooth guard was placed.  The Lalit laryngoscope was used to visualize the   oropharynx.  Tonsils appeared normal.  Posterior pharyngeal wall was normal.    Vallecula and base of tongue were normal.  Epiglottis was normal posterior   aspect of the bilateral vocal folds was normal.  The postcricoid area and   piriforms were normal as well.  The tooth guard and laryngoscopy was removed.    She was redraped in the usual sterile fashion.  A 1:1000 epinephrine-soaked   pledgets stained with fluorescein placed in the nasal cavities.  Once allowed   to take effect, I injected the septum and inferior turbinates with 1%   lidocaine and 1:100,000 epinephrine.  I then made a stab incision in  the   anterior aspect of the left inferior turbinate and elevated subperiosteal   plane.  I used 2.9 mm microdebrider blade to partially resect the inferior   turbinate, stromal tissue.  I then infractured and outfractured.  I then did   the same thing on the right hand side.  I made an incision a centimeter back   from the caudal septum and elevated subperiosteal plane on the right side,   went through the septal cartilage just posterior my incision, elevated   subperichondrial serosa on the left side.  I resected the deviated bone and   cartilage using a Robert.  The flaps were intact.  I made a drainage port   in the left posterior flap.  She had a lot of crusting and inflammation in the   anterior septum, so I elected to place splints to allow a chance to heal.  I   placed trimmed Simmons splint in each nostril and secured them transseptally   with a 2-0 Prolene.  bacitracin-coated finger cots placed in each nasal cavity   and connected to each other.  The patient was then awakened from anesthesia   and taken to recovery in stable condition.  Counts were correct.  She will be   discharged on Keflex.        ______________________________  MD NIESHA Araujo/MICHELLE    DD:  04/25/2024 08:46  DT:  04/25/2024 09:11    Job#:  [id number]

## 2024-04-25 NOTE — ANESTHESIA TIME REPORT
Anesthesia Start and Stop Event Times       Date Time Event    4/25/2024 0744 Anesthesia Start     0745 Ready for Procedure     0855 Anesthesia Stop          Responsible Staff  04/25/24      Name Role Begin End    Estee Roberts M.D. Anesth 0744 0855          Overtime Reason:  no overtime (within assigned shift)    Comments:

## 2024-04-25 NOTE — ANESTHESIA POSTPROCEDURE EVALUATION
Patient: Naomi Peters    Procedure Summary       Date: 04/25/24 Room / Location: [facility] ROOM 22 / SURGERY SAME DAY [facility]    Anesthesia Start: 0744 Anesthesia Stop: 0855    Procedures:       SEPTOPLASTY, LARYNGOSCOPY, DIRECT Without biopsy, BILATERAL EXCISION INFERIOR TURBINATE PARTIAL (Bilateral: Nose)      EXCISION, NASAL TURBINATE (Bilateral: Nose) Diagnosis: (dysphagia, deviated nasal septum)    Surgeons: John Mercer M.D. Responsible Provider: Estee Roberts M.D.    Anesthesia Type: general ASA Status: 2            Final Anesthesia Type: general  Last vitals  BP   Blood Pressure: 113/72, NIBP: 113/55    Temp   36.4 °C (97.6 °F)    Pulse   96   Resp   16    SpO2   95 %      Anesthesia Post Evaluation    Patient location during evaluation: PACU  Patient participation: complete - patient participated  Level of consciousness: awake and alert  Pain score: 2    Airway patency: patent  Anesthetic complications: no  Cardiovascular status: adequate  Respiratory status: acceptable and room air  Hydration status: acceptable    PONV: none          No notable events documented.     Nurse Pain Score: 2 (NPRS)

## 2024-04-25 NOTE — ANESTHESIA PREPROCEDURE EVALUATION
Case: [id number] Date/Time: 2430    Procedures:       SEPTOPLASTY, LARYNGOSCOPY, DIRECT, WITH OR WITHOUT TRACHEOSCOPY;  DIAGNOSIS, EXCEPT , BILATERAL EXCISION INFERIOR TURBINATE PARTIAL OR COMPLETE , ANY METHOD      EXCISION, NASAL TURBINATE    Pre-op diagnosis: J34.2, R13.14, K21.9    Location: [facility] ROOM 22 / SURGERY SAME DAY [facility]    Surgeons: John Mercer M.D.            Relevant Problems   NEURO   (positive) Nonintractable migraine      CARDIAC   (positive) Nonintractable migraine       Physical Exam    Airway   Mallampati: II  TM distance: >3 FB       Cardiovascular   Rhythm: regular  Rate: normal     Dental - normal exam           Pulmonary   Breath sounds clear to auscultation     Abdominal - normal exam     Neurological                Anesthesia Plan    ASA 2       Plan - general       Airway plan will be ETT          Induction: intravenous    Postoperative Plan: Postoperative administration of opioids is intended.    Pertinent diagnostic labs and testing reviewed    Informed Consent:    Anesthetic plan and risks discussed with patient.    Use of blood products discussed with: whom consented to blood products.

## 2024-04-25 NOTE — OR SURGEON
Immediate Post OP Note    PreOp Diagnosis: sore throat, septal deviation, enlarged B IT       PostOp Diagnosis: same       Procedure(s):  SEPTOPLASTY, LARYNGOSCOPY, DIRECT Without biopsy, BILATERAL EXCISION INFERIOR TURBINATE PARTIAL - Wound Class: Clean Contaminated       Surgeon(s):  John Mercer M.D.    Anesthesiologist/Type of Anesthesia:  Anesthesiologist: Estee Roberts M.D./General    Surgical Staff:  Circulator: Yessica Chapman R.N.  Relief Circulator: Gaye Luong R.N.  Scrub Person: Kati Pérez; Lucie Lindquist    Specimens removed if any:  * No specimens in log *    Estimated Blood Loss: 25cc    Findings: nml dl, septum to R    Complications: none         4/25/2024 8:41 AM John Mercer M.D.

## 2024-04-25 NOTE — ANESTHESIA PROCEDURE NOTES
Airway    Date/Time: 4/25/2024 7:57 AM    Performed by: Estee Roberts M.D.  Authorized by: Estee Roberts M.D.    Location:  OR  Urgency:  Elective  Difficult Airway: No    Indications for Airway Management:  Anesthesia      Spontaneous Ventilation: absent    Sedation Level:  Deep  Preoxygenated: Yes    Patient Position:  Sniffing  Mask Difficulty Assessment:  1 - vent by mask  Final Airway Type:  Endotracheal airway  Final Endotracheal Airway:  ETT  Cuffed: Yes    Technique Used for Successful ETT Placement:  Direct laryngoscopy    Insertion Site:  Oral  Blade Type:  River  Laryngoscope Blade/Videolaryngoscope Blade Size:  2  ETT Size (mm):  6.5  Measured from:  Teeth  ETT to Teeth (cm):  19  Placement Verified by: auscultation and capnometry    Cormack-Lehane Classification:  Grade I - full view of glottis  Number of Attempts at Approach:  1

## 2024-04-25 NOTE — OR NURSING
0850 patient arrived from OR, report received, attached to monitoring. VSS, patient oxygenating well on 6 L simple mask, surgical site to interior nose, rowland splints in bilateral nostril, nasal finger cots in place, clean/dry/intact on arrival, patient asleep     0900 patient denies pain and nausea at this time vss    0910 telephone updates to friend, Ericka, no answer will try again later    0915 patient tolerates sips of water     0930 patient co pain, medicated per EMAR     1015 patient co pain, medicated per EMAR     1016 fingercots removed and nasal drip pad placed    1100 dc instructions discussed with patient and patient's friend Ericka, questions answered     1114 patient dc home in stable condition at this time, vss, surgical site remains clean/dry/intact with nasal sling in place, piv dc tip intact, all belongings with patient and accounted for, safely dc to care of friend/family

## 2024-04-25 NOTE — DISCHARGE INSTRUCTIONS
HOME CARE INSTRUCTIONS    ACTIVITY: Rest and take it easy for the first 24 hours.  A responsible adult is recommended to remain with you during that time.  It is normal to feel sleepy.  We encourage you to not do anything that requires balance, judgment or coordination.    FOR 24 HOURS DO NOT:  Drive, operate machinery or run household appliances.  Drink beer or alcoholic beverages.  Make important decisions or sign legal documents.    SPECIAL INSTRUCTIONS: Neilmed rinses 2x/day start tomorrow. Follow up Wednesday for splint removal. Do not take Ibuprofen until cleared by MD     DIET: To avoid nausea, slowly advance diet as tolerated, avoiding spicy or greasy foods for the first day.  Add more substantial food to your diet according to your physician's instructions.  INCREASE FLUIDS AND FIBER TO AVOID CONSTIPATION.    SURGICAL DRESSING/BATHING: okay to shower tomorrow, avoid hot/steamy showers and prolonged bending over    MEDICATIONS: Resume taking daily medication.  Take prescribed pain medication with food.  If no medication is prescribed, you may take non-aspirin pain medication if needed.  PAIN MEDICATION CAN BE VERY CONSTIPATING.  Take a stool softener or laxative such as senokot, pericolace, or milk of magnesia if needed.    :Prescription given for keflex.  Last pain medication given at Tylenol & Celebrex (Ibuprofen like medication) given @ 7:30 AM; Next Tylenol okay @ 1:30 PM    A follow-up appointment should be arranged with your doctor; call to schedule.    You should CALL YOUR PHYSICIAN if you develop:  Fever greater than 101 degrees F.  Pain not relieved by medication, or persistent nausea or vomiting.  Excessive bleeding (blood soaking through dressing) or unexpected drainage from the wound.  Extreme redness or swelling around the incision site, drainage of pus or foul smelling drainage.  Inability to urinate or empty your bladder within 8 hours.      You should call 911 if you develop problems with  breathing or chest pain.  If you are unable to contact your doctor or surgical center, you should go to the nearest emergency room or urgent care center.  Physician's telephone #: Dr. Mercer @ [phone]     MILD FLU-LIKE SYMPTOMS ARE NORMAL.  YOU MAY EXPERIENCE GENERALIZED MUSCLE ACHES, THROAT IRRITATION, HEADACHE AND/OR SOME NAUSEA.    If any questions arise, call your doctor.  If your doctor is not available, please feel free to call the Surgical Center at [phone].  The Center is open Monday through Friday from 7AM to 7PM.      A registered nurse may call you a few days after your surgery to see how you are doing after your procedure.    You may also receive a survey in the mail within the next two weeks and we ask that you take a few moments to complete the survey and return it to us.  Our goal is to provide you with very good care and we value your comments.     Depression / Suicide Risk    As you are discharged from this [facility] facility, it is important to learn how to keep safe from harming yourself.    Recognize the warning signs:  Abrupt changes in personality, positive or negative- including increase in energy   Giving away possessions  Change in eating patterns- significant weight changes-  positive or negative  Change in sleeping patterns- unable to sleep or sleeping all the time   Unwillingness or inability to communicate  Depression  Unusual sadness, discouragement and loneliness  Talk of wanting to die  Neglect of personal appearance   Rebelliousness- reckless behavior  Withdrawal from people/activities they love  Confusion- inability to concentrate     If you or a loved one observes any of these behaviors or has concerns about self-harm, here's what you can do:  Talk about it- your feelings and reasons for harming yourself  Remove any means that you might use to hurt yourself (examples: pills, rope, extension cords, firearm)  Get professional help from the community (Mental Health,  Substance Abuse, psychological counseling)  Do not be alone:Call your Safe Contact- someone whom you trust who will be there for you.  Call your local CRISIS HOTLINE 283-4866 or 271-241-1211  Call your local Children's Mobile Crisis Response Team [place] (097) 370-8070 or www.[employer]  Call the toll free National Suicide Prevention Hotlines   National Suicide Prevention Lifeline 398-282-ZQHY (6355)  [facility] Line Network 800-SUICIDE (475-7713)    I acknowledge receipt and understanding of these Home Care instructions.

## 2024-05-21 ENCOUNTER — OFFICE VISIT (OUTPATIENT)
Dept: MEDICAL GROUP | Facility: MEDICAL CENTER | Age: 39
End: 2024-05-21
Payer: COMMERCIAL

## 2024-05-21 ENCOUNTER — HOSPITAL ENCOUNTER (OUTPATIENT)
Dept: RADIOLOGY | Facility: MEDICAL CENTER | Age: 39
End: 2024-05-21
Attending: NURSE PRACTITIONER
Payer: COMMERCIAL

## 2024-05-21 VITALS
BODY MASS INDEX: 33.13 KG/M2 | HEART RATE: 64 BPM | RESPIRATION RATE: 16 BRPM | OXYGEN SATURATION: 98 % | TEMPERATURE: 97.3 F | WEIGHT: 187 LBS | HEIGHT: 63 IN | DIASTOLIC BLOOD PRESSURE: 72 MMHG | SYSTOLIC BLOOD PRESSURE: 120 MMHG

## 2024-05-21 DIAGNOSIS — M54.50 BILATERAL LOW BACK PAIN WITHOUT SCIATICA, UNSPECIFIED CHRONICITY: ICD-10-CM

## 2024-05-21 DIAGNOSIS — R87.810 ASCUS WITH POSITIVE HIGH RISK HPV CERVICAL: ICD-10-CM

## 2024-05-21 DIAGNOSIS — R73.03 PREDIABETES: ICD-10-CM

## 2024-05-21 DIAGNOSIS — Z11.59 ENCOUNTER FOR HEPATITIS C SCREENING TEST FOR LOW RISK PATIENT: ICD-10-CM

## 2024-05-21 DIAGNOSIS — F33.41 MDD (MAJOR DEPRESSIVE DISORDER), RECURRENT, IN PARTIAL REMISSION (HCC): ICD-10-CM

## 2024-05-21 DIAGNOSIS — Z76.89 ENCOUNTER TO ESTABLISH CARE: ICD-10-CM

## 2024-05-21 DIAGNOSIS — F41.0 PANIC ATTACKS: ICD-10-CM

## 2024-05-21 DIAGNOSIS — T14.8XXA BRUISING: ICD-10-CM

## 2024-05-21 DIAGNOSIS — R87.610 ASCUS WITH POSITIVE HIGH RISK HPV CERVICAL: ICD-10-CM

## 2024-05-21 DIAGNOSIS — Z11.4 SCREENING FOR HIV (HUMAN IMMUNODEFICIENCY VIRUS): ICD-10-CM

## 2024-05-21 DIAGNOSIS — E22.1 HYPERPROLACTINEMIA (HCC): ICD-10-CM

## 2024-05-21 DIAGNOSIS — F41.1 GENERALIZED ANXIETY DISORDER: ICD-10-CM

## 2024-05-21 PROBLEM — Z82.69 FAMILY HISTORY OF SYSTEMIC LUPUS ERYTHEMATOSUS: Chronic | Status: ACTIVE | Noted: 2019-04-15

## 2024-05-21 PROBLEM — L30.9 ECZEMA: Status: RESOLVED | Noted: 2018-02-07 | Resolved: 2024-05-21

## 2024-05-21 PROBLEM — Z13.71 BRCA GENE MUTATION NEGATIVE: Chronic | Status: ACTIVE | Noted: 2019-05-22

## 2024-05-21 PROBLEM — G43.109 MIGRAINE WITH AURA AND WITHOUT STATUS MIGRAINOSUS, NOT INTRACTABLE: Status: ACTIVE | Noted: 2019-04-15

## 2024-05-21 PROBLEM — Z80.3 FAMILY HISTORY OF MALIGNANT NEOPLASM OF BREAST: Chronic | Status: ACTIVE | Noted: 2019-04-15

## 2024-05-21 PROBLEM — R41.840 CONCENTRATION DEFICIT: Status: RESOLVED | Noted: 2020-10-02 | Resolved: 2024-05-21

## 2024-05-21 PROBLEM — Z80.41 FAMILY HISTORY OF MALIGNANT NEOPLASM OF OVARY: Chronic | Status: ACTIVE | Noted: 2019-04-15

## 2024-05-21 PROBLEM — Z97.5 NEXPLANON IN PLACE: Status: ACTIVE | Noted: 2024-05-21

## 2024-05-21 PROBLEM — E66.9 OBESITY (BMI 35.0-39.9 WITHOUT COMORBIDITY): Status: RESOLVED | Noted: 2018-06-19 | Resolved: 2024-05-21

## 2024-05-21 PROBLEM — F90.0 ATTENTION DEFICIT HYPERACTIVITY DISORDER (ADHD), INATTENTIVE TYPE, MILD: Chronic | Status: ACTIVE | Noted: 2021-07-01

## 2024-05-21 PROBLEM — J02.0 PHARYNGITIS DUE TO STREPTOCOCCUS SPECIES: Status: RESOLVED | Noted: 2021-08-02 | Resolved: 2024-05-21

## 2024-05-21 PROBLEM — E28.2 PCOS (POLYCYSTIC OVARIAN SYNDROME): Chronic | Status: ACTIVE | Noted: 2018-02-07

## 2024-05-21 PROBLEM — G43.109 MIGRAINE WITH AURA AND WITHOUT STATUS MIGRAINOSUS, NOT INTRACTABLE: Chronic | Status: ACTIVE | Noted: 2019-04-15

## 2024-05-21 PROCEDURE — 3078F DIAST BP <80 MM HG: CPT | Performed by: NURSE PRACTITIONER

## 2024-05-21 PROCEDURE — 99214 OFFICE O/P EST MOD 30 MIN: CPT | Performed by: NURSE PRACTITIONER

## 2024-05-21 PROCEDURE — 3074F SYST BP LT 130 MM HG: CPT | Performed by: NURSE PRACTITIONER

## 2024-05-21 ASSESSMENT — PATIENT HEALTH QUESTIONNAIRE - PHQ9: CLINICAL INTERPRETATION OF PHQ2 SCORE: 0

## 2024-05-21 ASSESSMENT — FIBROSIS 4 INDEX: FIB4 SCORE: 0.58

## 2024-05-21 NOTE — PROGRESS NOTES
HPI    Naomi Peters is a 38 y.o. female here to Establish Care and   Chief Complaint   Patient presents with    Establish Care      Previous PCP : Chichi PICKARD    Problem   Panic Attacks    PRN Lorazepam for this. Followed by [facility] for this.      Nexplanon in Place    Placed 12/2023  Followed by GYN     Ascus With Positive High Risk Hpv Cervical    S/p negative colposcopy 12/2023-repeat pap due 11/2024  Followed by GYN.     Hyperprolactinemia (Hcc)    Symptoms were difficulty with weight gain.   NL MRI of Brain.  Followed by Endocrinology.      Attention Deficit Hyperactivity Disorder (Adhd), Inattentive Type, Mild    Not on medications for this.      Prediabetes    On Metformin and Ozempic for this.   Followed by Endocrinology- Dr. Keating for this.         Family History of Systemic Lupus Erythematosus    Mother with hx of Lupus     Mdd (Major Depressive Disorder), Recurrent, in Partial Remission (Hcc)    Taking Venlafaxine 225mg and Lamictal 50mg daily for this. Followed by [facility]. She is also in counseling.      Family history of ovarian cancer    IMO load March 2020     Family history of breast cancer    IMO load March 2020     Migraine With Aura and Without Status Migrainosus, Not Intractable    Stable. Having 1-2 month now-was getting them 1/week.  Taking Excedrin for this- she states that Imitrex was not effective.     SANDI (generalized anxiety disorder)    Taking Venlafaxine 225mg XR daily and Buspirone 30mg BID for this. -Followed by [facility]. She is also in counseling.      Pcos (Polycystic Ovarian Syndrome)    Has Nexplanon and taking Metformin for this.   Followed by Gynecology          She tells me she hurt left knee July 2023-which is a workman's comp case. She tells me she was on crutches for this but now having back pain. She would like to have a referral to possibly a pain specialist.   No sciatica symptoms.   Midline  back pain, soft moveable lumps bilateral lower back.   She has been doing PT for her knee and the her back-somewhat helpful.     Waking up with bruising on left leg, legs most of the time.   Noticed recently.  Does not recall injuring her self.   No excessive nose bleeds or bleeding gums.   Not on any blood thinners  No know family hx of coagulation disorders    Chart reviewed    ROS: SEE ABOVE        Current Outpatient Medications:     lamoTRIgine (LAMICTAL) 25 MG Tab, Take 50 mg by mouth every day., Disp: , Rfl:     venlafaxine ER (EFFEXOR) 225 MG TABLET SR 24 HR tablet, Take 225 mg by mouth every day., Disp: , Rfl:     Semaglutide, 2 MG/DOSE, (OZEMPIC, 2 MG/DOSE,) 8 MG/3ML Solution Pen-injector, DIAL AND INJECT UNDER THE SKIN 2 MG EVERY 7 DAYS (Patient taking differently: DIAL AND INJECT UNDER THE SKIN 2 MG EVERY 7 DAYS; Tuesdays), Disp: 3 mL, Rfl: 6    metFORMIN ER (GLUCOPHAGE XR) 500 MG TABLET SR 24 HR, Take 2 Tablets by mouth 2 times a day. (Patient taking differently: Take 500 mg by mouth 2 times a day.), Disp: 360 Tablet, Rfl: 3    busPIRone (BUSPAR) 30 MG tablet, 30 mg 2 times a day., Disp: , Rfl:     LORazepam (ATIVAN) 1 MG Tab, Take 1 mg by mouth every 6 hours as needed., Disp: , Rfl:     Allergies   Allergen Reactions    Cat Hair Extract      Other reaction(s): Not available    Cat Pelt Standardized Allergenic Extract       Past Medical History:   Diagnosis Date    Anxiety     Depression     Eczema 02/07/2018    Migraine     PCOS (polycystic ovarian syndrome)      Past Surgical History:   Procedure Laterality Date    FL LARYNGOSCOPY,DIRECT,DIAGNOSTIC Bilateral 04/25/2024    Procedure: SEPTOPLASTY, LARYNGOSCOPY, DIRECT Without biopsy, BILATERAL EXCISION INFERIOR TURBINATE PARTIAL;  Surgeon: John Mercer M.D.;  Location: SURGERY SAME DAY [facility];  Service: Ent    FL EXCISION TURBINATE Bilateral 04/25/2024    Procedure: EXCISION, NASAL TURBINATE;  Surgeon: John Mercer M.D.;  Location: SURGERY SAME  DAY [facility];  Service: Ent    OTHER      back surgery     Family History   Problem Relation Age of Onset    Diabetes Mother     Anxiety disorder Mother     Depression Mother     Ovarian Cancer Mother     Rheumatologic Disease Mother         Lupus    Alcohol abuse Father     Alcohol abuse Brother     Schizophrenia Brother     Anxiety disorder Brother     Depression Brother     Colon Cancer Maternal Aunt     Breast Cancer Paternal Aunt     Breast Cancer Paternal Aunt     Heart Disease Maternal Grandmother     Pancreatic Cancer Paternal Grandmother      Social History     Socioeconomic History    Marital status: Single     Spouse name: Not on file    Number of children: Not on file    Years of education: Not on file    Highest education level: Associate degree: academic program   Occupational History    Not on file   Tobacco Use    Smoking status: Never    Smokeless tobacco: Never   Vaping Use    Vaping status: Never Used   Substance and Sexual Activity    Alcohol use: Yes     Alcohol/week: 0.6 oz     Types: 1 Shots of liquor per week     Comment: rarely, one drink every 2 months, social    Drug use: No    Sexual activity: Not Currently     Partners: Male     Birth control/protection: Condom, Implant   Other Topics Concern    Not on file   Social History Narrative    Not on file     Social Determinants of Health     Financial Resource Strain: Patient Declined (3/14/2024)    Overall Financial Resource Strain (CARDIA)     Difficulty of Paying Living Expenses: Patient declined   Food Insecurity: Unknown (3/14/2024)    Hunger Vital Sign     Worried About Running Out of Food in the Last Year: Never true     Ran Out of Food in the Last Year: Patient declined   Transportation Needs: No Transportation Needs (3/14/2024)    PRAPARE - Transportation     Lack of Transportation (Medical): No     Lack of Transportation (Non-Medical): No   Physical Activity: Insufficiently Active (3/14/2024)    Exercise Vital Sign     Days of  "Exercise per Week: 2 days     Minutes of Exercise per Session: 30 min   Stress: Stress Concern Present (3/14/2024)    [employer] - Occupational Stress Questionnaire     Feeling of Stress : To some extent   Social Connections: Moderately Isolated (3/14/2024)    Social Connection and Isolation Panel [NHANES]     Frequency of Communication with Friends and Family: Three times a week     Frequency of Social Gatherings with Friends and Family: Twice a week     Attends Mandaen Services: More than 4 times per year     Active Member of Clubs or Organizations: No     Attends Club or Organization Meetings: Patient declined     Marital Status:    Intimate Partner Violence: Not on file   Housing Stability: High Risk (3/14/2024)    Housing Stability Vital Sign     Unable to Pay for Housing in the Last Year: Yes     Number of Places Lived in the Last Year: 1     Unstable Housing in the Last Year: No       Objective:     Vitals: /72   Pulse 64   Temp 36.3 °C (97.3 °F)   Resp 16   Ht 1.6 m (5' 3\")   Wt 84.8 kg (187 lb)   SpO2 98%   BMI 33.13 kg/m²      General: Alert, pleasant, NAD  HEENT:  Normocephalic.  Neck supple.  No thyromegaly or masses palpated. No cervical or supraclavicular lymphadenopathy.  Heart:  Regular rate and rhythm.  S1 and S2 normal.  No murmurs appreciated.    Respiratory:  Normal respiratory effort.  Clear to auscultation bilaterally.    Skin:  Warm, dry, no rashes  Musculoskeletal:  Gait is normal.  Moves all extremities well.  Extremities:   No leg edema.  Neurological: No tremors  Psych:  Affect/mood is normal, judgement is good, memory is intact, grooming is appropriate.    Assessment and Plan.     38 y.o. female to establish care and discuss the followin. SANDI (generalized anxiety disorder)  Chronic. Stable. Continue Buspar and Effexor. Continue counseling.   Followed by Psychiatry.     2. MDD (major depressive disorder), recurrent, in partial " remission (HCC)  Stable. Continue Effexor and counseling.   Followed by Psychiatry.     3. Panic attacks  Stable. PRN Lorazepam-  Followed by Psychiatry.     4. Prediabetes  Stable.  Continue metformin, Ozempic.  Followed by endocrinology.    5. Hyperprolactinemia (HCC)  Stable.  Followed by endocrinology.    6. ASCUS with positive high risk HPV cervical  Due for 1 year follow-up 11/2024-followed by GYN.    7. Bilateral low back pain without sciatica, unspecified chronicity  New problem ongoing for about 45 months per patient.  Denies radiculopathy, no red flags.  Currently participating in physical therapy however not finding significant improvements.  She is interested in having referral to pain management.  Recommend continued physical therapy, lumbar x-ray to obtain baseline.  - Referral to Pain Management  - DX-LUMBAR SPINE-2 OR 3 VIEWS; Future    8. Bruising  New problem.  Uncertain etiology.  Mild bruising noted on left lower leg anterior aspect-not significant.  Uncertain etiology.  We will check iron studies.  - CBC WITHOUT DIFFERENTIAL; Future  - FERRITIN; Future  - IRON/TOTAL IRON BIND; Future    9. Screening for HIV (human immunodeficiency virus)  - HIV AG/AB COMBO ASSAY SCREENING; Future    10. Encounter for hepatitis C screening test for low risk patient  - HEP C VIRUS ANTIBODY; Future    11. Encounter to establish care       Return in about 1 year (around 5/21/2025) for for abnormal lab results/xray.      Christiane MENJIVAR.

## 2024-05-21 NOTE — LETTER
May 21, 2024        Naomi Peters  [address] [place] [address]        Colleen Peters was seen in the clinic today May 21st, 2024 for an office appointment.     If you have any questions or concerns, please don't hesitate to call.        Sincerely,        TIARA Díaz.    Electronically Signed

## 2024-05-24 ENCOUNTER — TELEPHONE (OUTPATIENT)
Dept: HEALTH INFORMATION MANAGEMENT | Facility: OTHER | Age: 39
End: 2024-05-24
Payer: COMMERCIAL

## 2024-06-03 ENCOUNTER — APPOINTMENT (OUTPATIENT)
Dept: PHYSICAL MEDICINE AND REHAB | Facility: MEDICAL CENTER | Age: 39
End: 2024-06-03
Payer: COMMERCIAL

## 2024-06-03 VITALS
TEMPERATURE: 98.7 F | HEIGHT: 63 IN | SYSTOLIC BLOOD PRESSURE: 116 MMHG | WEIGHT: 187 LBS | BODY MASS INDEX: 33.13 KG/M2 | DIASTOLIC BLOOD PRESSURE: 74 MMHG | HEART RATE: 87 BPM | OXYGEN SATURATION: 98 %

## 2024-06-03 DIAGNOSIS — Z71.3 DIETARY COUNSELING: ICD-10-CM

## 2024-06-03 DIAGNOSIS — G89.29 CHRONIC PAIN OF LEFT KNEE: ICD-10-CM

## 2024-06-03 DIAGNOSIS — M70.62 GREATER TROCHANTERIC BURSITIS OF LEFT HIP: ICD-10-CM

## 2024-06-03 DIAGNOSIS — E66.9 OBESITY (BMI 30-39.9): ICD-10-CM

## 2024-06-03 DIAGNOSIS — M54.6 DORSALGIA OF THORACIC REGION: ICD-10-CM

## 2024-06-03 DIAGNOSIS — Z71.82 EXERCISE COUNSELING: ICD-10-CM

## 2024-06-03 DIAGNOSIS — M47.9 SPONDYLOSIS: ICD-10-CM

## 2024-06-03 DIAGNOSIS — M25.562 CHRONIC PAIN OF LEFT KNEE: ICD-10-CM

## 2024-06-03 DIAGNOSIS — M47.816 FACET ARTHRITIS OF LUMBAR REGION: ICD-10-CM

## 2024-06-03 DIAGNOSIS — E55.9 VITAMIN D DEFICIENCY: ICD-10-CM

## 2024-06-03 DIAGNOSIS — R73.03 PREDIABETES: Chronic | ICD-10-CM

## 2024-06-03 PROCEDURE — 1125F AMNT PAIN NOTED PAIN PRSNT: CPT | Performed by: GENERAL PRACTICE

## 2024-06-03 PROCEDURE — 3074F SYST BP LT 130 MM HG: CPT | Performed by: GENERAL PRACTICE

## 2024-06-03 PROCEDURE — 3078F DIAST BP <80 MM HG: CPT | Performed by: GENERAL PRACTICE

## 2024-06-03 PROCEDURE — G2211 COMPLEX E/M VISIT ADD ON: HCPCS | Performed by: GENERAL PRACTICE

## 2024-06-03 PROCEDURE — 99204 OFFICE O/P NEW MOD 45 MIN: CPT | Performed by: GENERAL PRACTICE

## 2024-06-03 RX ORDER — GABAPENTIN 100 MG/1
100-300 CAPSULE ORAL NIGHTLY PRN
Qty: 90 CAPSULE | Refills: 3 | Status: SHIPPED | OUTPATIENT
Start: 2024-06-03

## 2024-06-03 RX ORDER — MELOXICAM 7.5 MG/1
15 TABLET ORAL DAILY
Qty: 28 TABLET | Refills: 0 | Status: SHIPPED | OUTPATIENT
Start: 2024-06-03

## 2024-06-03 ASSESSMENT — FIBROSIS 4 INDEX: FIB4 SCORE: 0.58

## 2024-06-03 ASSESSMENT — PATIENT HEALTH QUESTIONNAIRE - PHQ9: CLINICAL INTERPRETATION OF PHQ2 SCORE: 0

## 2024-06-03 ASSESSMENT — PAIN SCALES - GENERAL: PAINLEVEL: 5=MODERATE PAIN

## 2024-06-03 NOTE — PATIENT INSTRUCTIONS
"Diet  \"-Emphasizes fruits, vegetables, whole grains, and fat-free or low-fat milk and milk products  -Includes a variety of protein foods such as seafood, lean meats and poultry, eggs, legumes (beans and peas), soy products, nuts, and seeds.  -Is low in added sugars, sodium, saturated fats, trans fats, and cholesterol.  -Stays within your daily calorie needs\"  https://www.cdc.gov/healthyweight/healthy_eating/index.html    Exercise  \"We know 150 minutes of physical activity each week sounds like a lot, but you don’t have to do it all at once. It could be 30 minutes a day, 5 days a week. You can spread your activity out during the week and break it up into smaller chunks of time.\"  https://www.cdc.gov/physicalactivity/basics/adults/index.htm  \"Exercise, multidisciplinary rehabilitation, acupuncture, CBT, and mind-body practices were most consistently associated with durable slight to moderate improvements in function and pain for specific chronic pain conditions. \"  https://effectivehealthcare.ahrq.gov/products/nonpharma-treatment-pain/research-2018    "

## 2024-06-03 NOTE — PROGRESS NOTES
Physiatry (Physical Medicine and  Rehabilitation)       Patient Name: Naomi Peters   Patient : 1985  PCP: BERNICE Díaz  MRN: [id number]     Date of service: See epic    Chief complaint:   Chief Complaint   Patient presents with    [facility]     Bi-lat lbp without sciatica        Referring provider: Christiane Dickerson*    HISTORY    Naomi Peters is a 38 y.o. pleasant female  who presents with a referral for   Chief Complaint   Patient presents with    [facility]     Bi-lat lbp without sciatica          Medical records review:  I reviewed the note from the referring provider Christiane Dickerson* including the note dated 24.    Prior Procedures:   Plasma disc compression [facility] 2009  Prior injections in [place].     HPI:    6/3/2024 chronic low back pain. Aching, pins and needles, and sharp.  Pain level 5/10 and previously 10/10.    No recent injury, falls, or trauma.  Pain worse with sitting, standing, walking, bending forward, bending backward, side bending, walking upstairs, walking downstairs, coughing.  Pain only relieves with laying on her right side.  No associated symptoms:  No radiation of pain down BLE, incontinence, progressive weakness, loss of genital sensation, or coordination issues.  The patient  denies any fevers, chills, chest pain or shortness of breath. Affecting ADLs, such as sleep, work and ambulation. Currently involved with PT.  Interested in injections.     ROS:   Fever, Chills, Sweats: Denies  Involuntary Weight Loss: Denies  Endorses depression and anxiety.  See HPI.   All other systems reviewed and negative.     OCCUPATIONAL history: prek teacher      GOALS OF TREATMENT: Symptom/Pain relief. improve function.        PMHx:   Past Medical History:   Diagnosis Date    Anxiety     Depression     Eczema 2018    Migraine     PCOS (polycystic ovarian syndrome)        PSHx:   Past Surgical History:    Procedure Laterality Date    CA LARYNGOSCOPY,DIRECT,DIAGNOSTIC Bilateral 04/25/2024    Procedure: SEPTOPLASTY, LARYNGOSCOPY, DIRECT Without biopsy, BILATERAL EXCISION INFERIOR TURBINATE PARTIAL;  Surgeon: John Mercer M.D.;  Location: SURGERY SAME DAY [facility];  Service: Ent    CA EXCISION TURBINATE Bilateral 04/25/2024    Procedure: EXCISION, NASAL TURBINATE;  Surgeon: John Mercer M.D.;  Location: SURGERY SAME DAY [facility];  Service: Ent    OTHER      back surgery       Family history   Family History   Problem Relation Age of Onset    Diabetes Mother     Anxiety disorder Mother     Depression Mother     Ovarian Cancer Mother     Rheumatologic Disease Mother         Lupus    Alcohol abuse Father     Alcohol abuse Brother     Schizophrenia Brother     Anxiety disorder Brother     Depression Brother     Colon Cancer Maternal Aunt     Breast Cancer Paternal Aunt     Breast Cancer Paternal Aunt     Heart Disease Maternal Grandmother     Pancreatic Cancer Paternal Grandmother        Medications: reviewed on epic.   Outpatient Medications Marked as Taking for the 6/3/24 encounter (Office Visit) with Sylvester Laureano D.O.   Medication Sig Dispense Refill    gabapentin (NEURONTIN) 100 MG Cap Take 1-3 Capsules by mouth at bedtime as needed (pain). 90 Capsule 3    meloxicam (MOBIC) 7.5 MG Tab Take 2 Tablets by mouth every day. For 14 days then stop. Do not take other NSAIDs. No refills. 28 Tablet 0    lamoTRIgine (LAMICTAL) 25 MG Tab Take 50 mg by mouth every day.      venlafaxine ER (EFFEXOR) 225 MG TABLET SR 24 HR tablet Take 225 mg by mouth every day.      Semaglutide, 2 MG/DOSE, (OZEMPIC, 2 MG/DOSE,) 8 MG/3ML Solution Pen-injector DIAL AND INJECT UNDER THE SKIN 2 MG EVERY 7 DAYS (Patient taking differently: DIAL AND INJECT UNDER THE SKIN 2 MG EVERY 7 DAYS; Tuesdays) 3 mL 6    metFORMIN ER (GLUCOPHAGE XR) 500 MG TABLET SR 24 HR Take 2 Tablets by mouth 2 times a day. (Patient taking differently: Take 500 mg by mouth 2  times a day.) 360 Tablet 3    busPIRone (BUSPAR) 30 MG tablet 30 mg 2 times a day.      LORazepam (ATIVAN) 1 MG Tab Take 1 mg by mouth every 6 hours as needed.          Allergies:   Allergies   Allergen Reactions    Cat Hair Extract      Other reaction(s): Not available    Cat Pelt Standardized Allergenic Extract       Social Hx:   Social History     Socioeconomic History    Marital status: Single     Spouse name: Not on file    Number of children: Not on file    Years of education: Not on file    Highest education level: Associate degree: academic program   Occupational History    Not on file   Tobacco Use    Smoking status: Never    Smokeless tobacco: Never   Vaping Use    Vaping status: Never Used   Substance and Sexual Activity    Alcohol use: Yes     Alcohol/week: 0.6 oz     Types: 1 Shots of liquor per week     Comment: rarely, one drink every 2 months, social    Drug use: No    Sexual activity: Not Currently     Partners: Male     Birth control/protection: Condom, Implant   Other Topics Concern    Not on file   Social History Narrative    Not on file     Social Determinants of Health     Financial Resource Strain: Patient Declined (3/14/2024)    Overall Financial Resource Strain (CARDIA)     Difficulty of Paying Living Expenses: Patient declined   Food Insecurity: Unknown (3/14/2024)    Hunger Vital Sign     Worried About Running Out of Food in the Last Year: Never true     Ran Out of Food in the Last Year: Patient declined   Transportation Needs: No Transportation Needs (3/14/2024)    PRAPARE - Transportation     Lack of Transportation (Medical): No     Lack of Transportation (Non-Medical): No   Physical Activity: Insufficiently Active (3/14/2024)    Exercise Vital Sign     Days of Exercise per Week: 2 days     Minutes of Exercise per Session: 30 min   Stress: Stress Concern Present (3/14/2024)    [place] [employer] - Occupational Stress Questionnaire     Feeling of Stress : To some  "extent   Social Connections: Moderately Isolated (3/14/2024)    Social Connection and Isolation Panel [NHANES]     Frequency of Communication with Friends and Family: Three times a week     Frequency of Social Gatherings with Friends and Family: Twice a week     Attends Restoration Services: More than 4 times per year     Active Member of Clubs or Organizations: No     Attends Club or Organization Meetings: Patient declined     Marital Status:    Intimate Partner Violence: Not on file   Housing Stability: High Risk (3/14/2024)    Housing Stability Vital Sign     Unable to Pay for Housing in the Last Year: Yes     Number of Places Lived in the Last Year: 1     Unstable Housing in the Last Year: No         EXAMINATION   Vitals: /74 (BP Location: Left arm, Patient Position: Sitting, BP Cuff Size: Large adult)   Pulse 87   Temp 37.1 °C (98.7 °F) (Temporal)   Ht 1.6 m (5' 3\")   Wt 84.8 kg (187 lb)   SpO2 98%   Physical Exam:     Body Habitus: Body mass index is 33.13 kg/m².  Appearance: Well-groomed, well-nourished, not disheveled  Eyes: No scleral icterus to suggest severe liver disease, no proptosis to suggest severe hyperthyroid  ENT -no obvious auditory deficits, no external lesions, moist mucus membranes   Skin -no rashes or lesions noted. No appreciable skin breakdown on exposed skin areas.    Respiratory-  breathing comfortably on room air, no audible wheezing, full sentences  Cardiovascular- No lower extremity edema noted.   Psychiatric- alert and oriented, calm, comfortable, cooperative     Musculoskeletal and Neuro:  Gait and station - normal gait with reciprocal pattern,  no presence/use of ambulatory device, no arm assistance with sit-to-stand, nonantalgic. no loss of balance during exam.  No change in patient's demeanor with exam.    Grossly normal cranial nerve exam  Coordination grossly intact  Single leg balance / Trendelenburg:  mildly limited in balance and control negative right, " positive left     Thoracic/Lumbar Spine/Sacral Spine/Hips   Inspection: No evidence of atrophy in bilateral lower extremities throughout     ROM: full  active range of motion with flexion, lateral flexion, and rotation bilaterally.   There is full  active range of motion with lumbar extension without pain.    There is pain with facet loading maneuver (extension rotation) with axial low back pain on the BILATERAL side(s)    Palpation:   tenderness to palpation in midline at T1-T4 nonfocal diffuse. tenderness to palpation diffusely in the left and right of the midline T1-L5, POSITIVE for tenderness to palpation to the para-midline region in the lower lumbar levels.  palpation over SI joint: positive bilaterally  palpation in hip or over the gluteus medius tendon insertion: negative right, positive left     Lumbar spine Special tests  Neuro tension  Straight leg test} negative bilaterally     HIP  FAIR test negative bilaterally   Range of motion in the RIGHT hip is full  in flexion, extension, abduction, internal rotation, external rotation.  Range of motion in the LEFT hip is full  in flexion, extension, abduction, internal rotation, external rotation.  Barby negative bilaterally       SI joint tests  Observation patient sits on one buttocks: Negative  SI joint compression negative bilaterally    SI joint distraction negative bilaterally  Thigh thrust test positive bilaterally   BARBY test negative bilaterally    Key points for the international standards for neurological classification of spinal cord injury (ISNCSCI) to light touch.   Dermatome R L   L2 2 2   L3 2 2   L4 2 2   L5 2 2   S1 2 2     Motor Exam Lower Extremities  ? Myotome R L   Hip flexion L2 5 5   Knee extension L3 5 5   Ankle dorsiflexion L4 5 5   Toe extension L5 5 5   Ankle plantarflexion S1 5 5   Hip Abduction  5 5   Hip Adduction  5 5     Reflexes  Clonus of the ankle negative bilaterally   ? R L   Patella 2+ 2+   Achilles  2+ 2+     MEDICAL  "DECISION MAKING    Medical records review: see under HPI section.     DATA    Labs:   Lab Results   Component Value Date/Time    SODIUM 138 03/04/2024 07:52 AM    POTASSIUM 3.9 03/04/2024 07:52 AM    CHLORIDE 105 03/04/2024 07:52 AM    CO2 23 03/04/2024 07:52 AM    ANION 10.0 03/04/2024 07:52 AM    GLUCOSE 100 (H) 03/04/2024 07:52 AM    BUN 7 (L) 03/04/2024 07:52 AM    CREATININE 0.60 03/04/2024 07:52 AM    CALCIUM 9.1 03/04/2024 07:52 AM    ASTSGOT 21 03/04/2024 07:52 AM    ALTSGPT 28 03/04/2024 07:52 AM    TBILIRUBIN 0.6 03/04/2024 07:52 AM    ALBUMIN 4.0 03/04/2024 07:52 AM    TOTPROTEIN 7.0 03/04/2024 07:52 AM    GLOBULIN 3.0 03/04/2024 07:52 AM    AGRATIO 1.3 03/04/2024 07:52 AM   ]    No results found for: \"PROTHROMBTM\", \"INR\"     Lab Results   Component Value Date/Time    WBC 6.2 04/27/2023 07:38 AM    RBC 5.00 04/27/2023 07:38 AM    HEMOGLOBIN 15.9 04/27/2023 07:38 AM    HEMATOCRIT 46.8 04/27/2023 07:38 AM    MCV 93.6 04/27/2023 07:38 AM    MCH 31.8 04/27/2023 07:38 AM    MCHC 34.0 04/27/2023 07:38 AM    MPV 9.9 04/27/2023 07:38 AM    NEUTSPOLYS 55.50 05/14/2019 07:46 AM    LYMPHOCYTES 34.40 05/14/2019 07:46 AM    MONOCYTES 7.20 05/14/2019 07:46 AM    EOSINOPHILS 2.10 05/14/2019 07:46 AM    BASOPHILS 0.60 05/14/2019 07:46 AM        Lab Results   Component Value Date/Time    HBA1C 5.4 04/27/2023 07:38 AM        Imaging:   I personally reviewed following images, these are my reads  Lumbar x-ray 5/21/2024: Mild L4-5 and L5-S1 facet joint arthrosis.  Degenerative changes  Left knee x-ray 10/20/2023: No acute bony processes: Very mild degenerative changes    IMAGING radiology reads. I reviewed the following radiology reads                               Results for orders placed during the hospital encounter of 07/30/23    DX-KNEE 3 VIEWS LEFT    Impression  No evidence of acute fracture or dislocation.    Results for orders placed during the hospital encounter of 12/20/23    DX-KNEE COMPLETE 4+ " LEFT    Impression  Normal left knee radiography.   Results for orders placed during the hospital encounter of 24    DX-LUMBAR SPINE-2 OR 3 VIEWS    Impression  1.  Transitional thoracolumbar vertebra considered T12 this dictation.    2.  Mild/moderate L4-5 degenerative disc disease and mild L4-5 and L5-S1 facet joint arthrosis.                           ASSESSMENT AND PLAN:  Naomilupis Finley Fran   : 1985   Past medical history of prediabetes, panic attacks, migraines, major depressive disorder, general anxiety disorder, chronic low back pain, BMI 31-39    Diagnosis   Visit Diagnoses     ICD-10-CM   1. Spondylosis  M47.9   2. Facet arthritis of lumbar region  M47.816   3. Prediabetes  R73.03   4. Exercise counseling  Z71.82   5. Dietary counseling  Z71.3   6. Obesity (BMI 30-39.9)  E66.9   7. Vitamin D deficiency  E55.9   8. Greater trochanteric bursitis of left hip  M70.62   9. Chronic pain of left knee  M25.562    G89.29   10. Dorsalgia of thoracic region  M54.6       Patient with historical and clinical evidence consistent with chronic low back pain status post reported disc compression 2009 Likely secondary to facet arthropathy as supported by x-ray and positive facet loading.  Will obtain MRI is ongoing pain for greater than 5 months has not had much response with medications, physical therapy.  Will prescribe her gabapentin, trial meloxicam which she has used remotely but does not recall if it was effective.  She has not found Celebrex effective.  Encouraged to use Tylenol as needed.   Spent additional time discussing pain control without opioids and the target pain receptors with NSAIDs, neuropathic pain control, and Tylenol.  Instructed patient to request clinical notes and imaging.   Dorsalgia thoracic region: Suspect myalgia but with history of vitamin D deficiency will obtain x-ray  History of vitamin D deficiency: 20 ng/dL  2019; recommend vitamin D screening; Hypovitaminosis D  can contribute to diffuse musculoskeletal aches, fatigue, and malaise and can affect exercise and therapy  Past medical history of, panic attacks, major depressive disorder: PHQ9 + with no suicidal ideation ready established with behavioral health    Greater trochanter left hip: Will obtain x-ray before proceeding with gel injections  Dietary and exercising counseling discussed.  Extensive discussion regarding treatment options, at this time recommending the following:    Orders Placed This Encounter    MR-LUMBAR SPINE-W/O    DX-HIP-COMPLETE - UNILATERAL 2+ LEFT    DX-THORACIC SPINE-2 VIEWS    MR-LUMBAR SPINE-W/O    gabapentin (NEURONTIN) 100 MG Cap    meloxicam (MOBIC) 7.5 MG Tab         PLAN  -Medications/Modalities:   -Consider taking Tylenol/acetaminophen 325-975mg every 6-8 hours as needed for mild/moderate pain for 14 days.  Do not take more than 3000mg of Tylenol in 24 hours.    -Melxociam 7.5 mg to 15 mg once daily for 14 days and then as needed; maximum dose: 15 mg/day. Take with milk or with food. do not take with other NSAIDs such as naproxen, ibuprofen, Celebrex, diclofenac  -You may also try ice packs or heating pads to help with pain for no more than 15 minutes at a time several times a day and monitor the skin for changes  Gabapentin - Do not increase gabapentin any faster to minimize potential side effects. A possible side effect are dizziness, drowsiness, respiratory depression, hypotension, agitation emotional liability. while your body is adjusting to the medicine. If you experience any unwanted side effects, decrease the gabapentin to the previous dose that you did not have side effects on. Gabapentin is a relatively slow acting medicine so you may not feel immediate relief.  For chronic use, an adequate trial with gabapentin may require 2 months or more.  increase dose weekly based on response and tolerability to a target dose range of 300 mg to 1.2 g 3 times daily  -Limitations:    Activity as  tolerated  -Brace/orthotic/assistive devices: Not indicated at this time  -Therapy (PT/OT/Aquatherapy): continue to focus on strengthening and stretching    -Home exercise program: encouraged and provided hip home exercises of regular strengthening and stretching. Demonstrated:  single leg balance exercises   -Office Injections: will consider at a later time as need imaging and clinical notes  -Diagnostic workup: reviewed today as above; XR and MRI  -Interventional program: I would  consider the patient for TPI and MBB injection depending on the results of the above   -Referrals: imaging   -Outside records requested:  The patient signed Outside Records Request Form for her outside records including images. This includes the records from [facility] and prior clinical care in [place] Jan 2009    Follow-up: After the above diagnostic studies to consider L greater trochanter and discuss interventional procedure  Patient expressed understanding of the management plan. Patient (and Family Members) was/were encouraged to call if any worries, issues, problems or concerns prior to the next visit     Please note that this dictation was created using voice recognition software. I have made every reasonable attempt to correct obvious errors but there may be errors of grammar and content that I may have overlooked prior to finalization of this note.      Sylvester Laureano, DO  Physical Medicine and Rehabilitation  [facility]         TIARA Daniels.   Christiane Glez*

## 2024-06-03 NOTE — Clinical Note
Christiane Salgado Malia Fran was evaluated back pain which I suspect due to facet arthropathy and myalgia.  Obtaining additional imaging including MRI and starting the patient on gabapentin and meloxicam PRN use concurrently with Tylenol as needed.  I also spent time discussing her out of his medications are more effective for pain control and opioid medicationsConsider obtaining a vitamin D.    Thank you for the referral.  Please contact if further questions.     Best Regards,   Sylvester Laureano, DO   Physical Medicine and Rehabilitation

## 2024-06-03 NOTE — LETTER
Ju 3, 2024       Patient: Naomi Peters   YOB: 1985   Date of Visit: 6/3/2024         To Whom It May Concern:        In my medical opinion, I recommend that Naomi Peters was seen in my office today 6/3/2024.     If you have any questions or concerns, please don't hesitate to call [phone]          Sincerely,          Sylvester Laureano D.O.  Electronically Signed

## 2024-06-05 DIAGNOSIS — E55.9 VITAMIN D DEFICIENCY: ICD-10-CM

## 2024-06-08 ENCOUNTER — HOSPITAL ENCOUNTER (OUTPATIENT)
Dept: LAB | Facility: MEDICAL CENTER | Age: 39
End: 2024-06-08
Attending: NURSE PRACTITIONER
Payer: COMMERCIAL

## 2024-06-08 ENCOUNTER — HOSPITAL ENCOUNTER (OUTPATIENT)
Dept: RADIOLOGY | Facility: MEDICAL CENTER | Age: 39
End: 2024-06-08
Attending: GENERAL PRACTICE
Payer: COMMERCIAL

## 2024-06-08 DIAGNOSIS — Z11.4 SCREENING FOR HIV (HUMAN IMMUNODEFICIENCY VIRUS): ICD-10-CM

## 2024-06-08 DIAGNOSIS — Z11.59 ENCOUNTER FOR HEPATITIS C SCREENING TEST FOR LOW RISK PATIENT: ICD-10-CM

## 2024-06-08 DIAGNOSIS — M54.6 DORSALGIA OF THORACIC REGION: ICD-10-CM

## 2024-06-08 DIAGNOSIS — E55.9 VITAMIN D DEFICIENCY: ICD-10-CM

## 2024-06-08 DIAGNOSIS — T14.8XXA BRUISING: ICD-10-CM

## 2024-06-08 DIAGNOSIS — M70.62 GREATER TROCHANTERIC BURSITIS OF LEFT HIP: ICD-10-CM

## 2024-06-08 LAB
25(OH)D3 SERPL-MCNC: 22 NG/ML (ref 30–100)
ERYTHROCYTE [DISTWIDTH] IN BLOOD BY AUTOMATED COUNT: 40.5 FL (ref 35.9–50)
FERRITIN SERPL-MCNC: 133 NG/ML (ref 10–291)
HCT VFR BLD AUTO: 45.1 % (ref 37–47)
HCV AB SER QL: NORMAL
HGB BLD-MCNC: 15.3 G/DL (ref 12–16)
HIV 1+2 AB+HIV1 P24 AG SERPL QL IA: NORMAL
IRON SATN MFR SERPL: 55 % (ref 15–55)
IRON SERPL-MCNC: 168 UG/DL (ref 40–170)
MCH RBC QN AUTO: 30.4 PG (ref 27–33)
MCHC RBC AUTO-ENTMCNC: 33.9 G/DL (ref 32.2–35.5)
MCV RBC AUTO: 89.5 FL (ref 81.4–97.8)
PLATELET # BLD AUTO: 350 K/UL (ref 164–446)
PMV BLD AUTO: 9.7 FL (ref 9–12.9)
RBC # BLD AUTO: 5.04 M/UL (ref 4.2–5.4)
TIBC SERPL-MCNC: 305 UG/DL (ref 250–450)
UIBC SERPL-MCNC: 137 UG/DL (ref 110–370)
WBC # BLD AUTO: 5.4 K/UL (ref 4.8–10.8)

## 2024-06-08 PROCEDURE — 85027 COMPLETE CBC AUTOMATED: CPT

## 2024-06-08 PROCEDURE — 82728 ASSAY OF FERRITIN: CPT

## 2024-06-08 PROCEDURE — 72072 X-RAY EXAM THORAC SPINE 3VWS: CPT

## 2024-06-08 PROCEDURE — 87389 HIV-1 AG W/HIV-1&-2 AB AG IA: CPT

## 2024-06-08 PROCEDURE — 73502 X-RAY EXAM HIP UNI 2-3 VIEWS: CPT | Mod: LT

## 2024-06-08 PROCEDURE — 86803 HEPATITIS C AB TEST: CPT

## 2024-06-08 PROCEDURE — 82306 VITAMIN D 25 HYDROXY: CPT

## 2024-06-08 PROCEDURE — 83540 ASSAY OF IRON: CPT

## 2024-06-08 PROCEDURE — 83550 IRON BINDING TEST: CPT

## 2024-06-08 PROCEDURE — 36415 COLL VENOUS BLD VENIPUNCTURE: CPT

## 2024-06-11 NOTE — RESULT ENCOUNTER NOTE
Dear Naomi Peters    I reviewed the results of your test.  There are no urgent findings that require urgent intervention.  We will discuss the results in detail at the follow-up visit after your mri.    Sylvester Laureano DO

## 2024-07-22 ENCOUNTER — HOSPITAL ENCOUNTER (OUTPATIENT)
Dept: RADIOLOGY | Facility: MEDICAL CENTER | Age: 39
End: 2024-07-22
Attending: GENERAL PRACTICE
Payer: COMMERCIAL

## 2024-07-22 DIAGNOSIS — M47.9 SPONDYLOSIS: ICD-10-CM

## 2024-07-22 PROCEDURE — 72148 MRI LUMBAR SPINE W/O DYE: CPT

## 2024-08-07 ENCOUNTER — APPOINTMENT (OUTPATIENT)
Dept: DERMATOLOGY | Facility: IMAGING CENTER | Age: 39
End: 2024-08-07
Payer: COMMERCIAL

## 2024-08-16 ENCOUNTER — NON-PROVIDER VISIT (OUTPATIENT)
Dept: OCCUPATIONAL MEDICINE | Facility: CLINIC | Age: 39
End: 2024-08-16

## 2024-08-16 ENCOUNTER — HOSPITAL ENCOUNTER (OUTPATIENT)
Facility: MEDICAL CENTER | Age: 39
End: 2024-08-16
Attending: NURSE PRACTITIONER
Payer: COMMERCIAL

## 2024-08-16 DIAGNOSIS — Z02.89 EXAMINATION, PHYSICAL, EMPLOYEE: Primary | ICD-10-CM

## 2024-08-16 DIAGNOSIS — Z02.89 EXAMINATION, PHYSICAL, EMPLOYEE: ICD-10-CM

## 2024-08-16 PROCEDURE — 86480 TB TEST CELL IMMUN MEASURE: CPT | Performed by: NURSE PRACTITIONER

## 2024-08-19 LAB
GAMMA INTERFERON BACKGROUND BLD IA-ACNC: 0.08 IU/ML
M TB IFN-G BLD-IMP: NEGATIVE
M TB IFN-G CD4+ BCKGRND COR BLD-ACNC: 0.09 IU/ML
MITOGEN IGNF BCKGRD COR BLD-ACNC: >10 IU/ML
QFT TB2 - NIL TBQ2: 0.13 IU/ML

## 2024-08-21 ENCOUNTER — OFFICE VISIT (OUTPATIENT)
Dept: DERMATOLOGY | Facility: IMAGING CENTER | Age: 39
End: 2024-08-21
Payer: COMMERCIAL

## 2024-08-21 DIAGNOSIS — L21.9 SEBORRHEA: ICD-10-CM

## 2024-08-21 DIAGNOSIS — L73.8 SEBACEOUS HYPERPLASIA: ICD-10-CM

## 2024-08-21 DIAGNOSIS — L91.8 ACROCHORDON: ICD-10-CM

## 2024-08-21 DIAGNOSIS — D22.9 MULTIPLE NEVI: ICD-10-CM

## 2024-08-21 DIAGNOSIS — Z12.83 SKIN CANCER SCREENING: ICD-10-CM

## 2024-08-21 DIAGNOSIS — D18.01 CHERRY ANGIOMA: ICD-10-CM

## 2024-08-21 DIAGNOSIS — L81.4 LENTIGINES: ICD-10-CM

## 2024-08-21 PROCEDURE — 99213 OFFICE O/P EST LOW 20 MIN: CPT | Performed by: NURSE PRACTITIONER

## 2024-08-21 RX ORDER — TRETINOIN 0.25 MG/G
CREAM TOPICAL
Qty: 45 G | Refills: 1 | Status: SHIPPED | OUTPATIENT
Start: 2024-08-21

## 2024-08-21 RX ORDER — CICLOPIROX 1 G/100ML
SHAMPOO TOPICAL
Qty: 120 ML | Refills: 3 | Status: SHIPPED | OUTPATIENT
Start: 2024-08-21

## 2024-08-21 NOTE — PROGRESS NOTES
DERMATOLOGY NOTE  FOLLOW UP VISIT       Chief complaint: Annual Exam (ABEL & fv on scalp  )     HPI/location: poss skin tag on rt axilla  Time present: 2-3 mth  Painful lesion: No  Itching lesion: No  Enlarging lesion: No  Anything make it better or worse?      Pt reports seborrhea not better ketoconazole did not help.   HPI:itchy scalp, white flakes, burning  Onset: 1 mth  Treatments: diff shampoos, oils and conditioner       History of skin cancer: No  History of precancers/actinic keratoses: No  History of biopsies:No  History of blistering/severe sunburns:No  Family history of skin cancer:Yes, Details: aunt -melanoma   Family history of atypical moles:No      Allergies   Allergen Reactions    Cat Hair Extract      Other reaction(s): Not available    Cat Pelt Standardized Allergenic Extract        MEDICATIONS:  Medications relevant to specialty reviewed.     REVIEW OF SYSTEMS:   Positive for skin (see HPI)  Negative for fevers and chills       EXAM:  There were no vitals taken for this visit.  Constitutional: Well-developed, well-nourished, and in no distress.     A total body skin exam was performed excluding the genitals per patient preference and including the following areas: head (including face), neck, chest, abdomen, groin/buttocks, back, bilateral upper extremities, and bilateral lower extremities with the following pertinent findings listed below and/or in assessment/plan.       -sun exposed skin of trunk and b/l upper, lower extremities and face with scattered clinically benign light brown reticulated macules all of which were morphologically similar and none of which were suspicious for skin cancer today on exam  Several scattered 1-3mm bright red macules and thin papules on the trunk  Multiple medium brown dark brown macules papules scattered over the trunk, face and extremities, All with benign-appearing pigment network patterns on dermoscopy  1-2mm skin-colored to hyperpigmented, soft, pedunculated  papule, R axilla  Seborrhea throughout scalp  Several scattered yellowish/red papules with telangiectasias and central dell, face    IMPRESSION / PLAN:      1. Seborrhea, not improved with ketoconazole shampoo  Again, discussed course/nature of disease  Discussed supportive/OTC measures, increase frequency of hair washing, alternating between 2 different anti-dandruff shampoos every 2 washes  Rx below  Follow up if no improvement  - Ciclopirox 1 % Shampoo; Use daily for 1-2 weeks until resolved, then can use 2-3 times per week as needed  Dispense: 120 mL; Refill: 3    2. Lentigines  - Benign-appearing nature of lesions discussed during exam.   - Advised to continue to monitor for any return to clinic for new or concerning changes.      3. Cherry angioma  - Benign-appearing nature of lesions discussed during exam.   - Advised to continue to monitor for any return to clinic for new or concerning changes.      4. Multiple nevi  - Benign-appearing nature of lesions discussed during exam.   - Advised to continue to monitor for any return to clinic for new or concerning changes.  - ABCDE's of melanoma discussed/handout given        5. Sebaceous hyperplasia  - Benign-appearing nature of lesions discussed during exam.   - discussed cosmetic tx options and associated office fee  - Advised to continue to monitor for any return to clinic for new or concerning changes.  Rx called to [facility], see med list    6. Acrochordon  - Benign-appearing nature of lesions discussed during exam.   - Advised to continue to monitor for any return to clinic for new or concerning changes.      7. Skin cancer screening  Skin cancer education  discussed importance of sun protective clothing, eyewear in addition to the use of broad spectrum sunscreen with SPF 30 or greater, as well as need for reapplication ~every 2 hours when exposed to UVR/handout given  discussed importance following up for any new or changing lesions as noted in handout  given, but every 12 months exams in clinic in the setting of dermatologic history  ABCDE's of melanoma discussed/handout given        Discussed risks, benefits, alternative treatments as well as common side effects associated with prescribed treatment, Patient verbalized understanding and agrees with plan regarding the above              Please note that this dictation was created using voice recognition software. I have made every reasonable attempt to correct obvious errors, but I expect that there are errors of grammar and possibly content that I did not discover before finalizing the note.      Return to clinic in: Return in about 1 year (around 8/21/2025) for ABEL. and as needed for any new or changing skin lesions.

## 2024-08-28 NOTE — ED NOTES
[facility]  Occupational Therapy Reassessment/Treatment Note     Date: 8/29/2024  Name: Laine Smith  Clinic Number: [id number]    Therapy Diagnosis:   Encounter Diagnoses   Name Primary?    Left hemiparesis Yes    Impaired mobility and ADLs        Physician: Radha Hayes MD    Physician Orders: Radha Hayes Pt discharged to home. Discharge paperwork provided. Education provided by ERP.  Pt was given follow up instructions and prescriptions . Pt verbalized understanding of all instructions for discharge. Patient ambulatory, alert and oriented x 4, out of ER with all belongings and steady gait.    Kamila Lombardi, OT

## 2024-08-29 ENCOUNTER — APPOINTMENT (OUTPATIENT)
Dept: MEDICAL GROUP | Facility: MEDICAL CENTER | Age: 39
End: 2024-08-29
Payer: COMMERCIAL

## 2024-09-10 ENCOUNTER — OFFICE VISIT (OUTPATIENT)
Dept: ENDOCRINOLOGY | Facility: MEDICAL CENTER | Age: 39
End: 2024-09-10
Attending: INTERNAL MEDICINE
Payer: COMMERCIAL

## 2024-09-10 VITALS
WEIGHT: 178 LBS | DIASTOLIC BLOOD PRESSURE: 60 MMHG | OXYGEN SATURATION: 98 % | SYSTOLIC BLOOD PRESSURE: 114 MMHG | HEIGHT: 63 IN | HEART RATE: 88 BPM | BODY MASS INDEX: 31.54 KG/M2

## 2024-09-10 DIAGNOSIS — Z87.2: ICD-10-CM

## 2024-09-10 DIAGNOSIS — E66.9 OBESITY (BMI 35.0-39.9 WITHOUT COMORBIDITY): ICD-10-CM

## 2024-09-10 DIAGNOSIS — E55.9 VITAMIN D DEFICIENCY: ICD-10-CM

## 2024-09-10 DIAGNOSIS — E22.1 HYPERPROLACTINEMIA (HCC): ICD-10-CM

## 2024-09-10 DIAGNOSIS — E28.2 PCOS (POLYCYSTIC OVARIAN SYNDROME): ICD-10-CM

## 2024-09-10 PROCEDURE — 99211 OFF/OP EST MAY X REQ PHY/QHP: CPT | Performed by: INTERNAL MEDICINE

## 2024-09-10 PROCEDURE — 99214 OFFICE O/P EST MOD 30 MIN: CPT | Performed by: INTERNAL MEDICINE

## 2024-09-10 PROCEDURE — 3074F SYST BP LT 130 MM HG: CPT | Performed by: INTERNAL MEDICINE

## 2024-09-10 PROCEDURE — 3078F DIAST BP <80 MM HG: CPT | Performed by: INTERNAL MEDICINE

## 2024-09-10 RX ORDER — METFORMIN HCL 500 MG
500 TABLET, EXTENDED RELEASE 24 HR ORAL 2 TIMES DAILY
Qty: 180 TABLET | Refills: 3 | Status: SHIPPED | OUTPATIENT
Start: 2024-09-10

## 2024-09-10 RX ORDER — SEMAGLUTIDE 2.68 MG/ML
INJECTION, SOLUTION SUBCUTANEOUS
Qty: 3 ML | Refills: 6 | Status: SHIPPED | OUTPATIENT
Start: 2024-09-10

## 2024-09-10 ASSESSMENT — FIBROSIS 4 INDEX: FIB4 SCORE: .4535573676110726727

## 2024-09-10 NOTE — PROGRESS NOTES
Chief Complaint: Follow up for hyperprolactinemia that is idiopathic, PCOS and obesity.      HPI:     Naomi Peters is a 39 y.o. female here for follow up of the above medical issues    She was found to have elevated prolactin levels of 28 on April 2023 associated with normal TSH levels.  Baseline pituitary MRI on June 2023 for the prolactin elevation showed no focal adenoma    PCOS was diagnosed by another provider years ago ( had hirsutism and irregular cycles) and she was treated with Ozempic and then I added metformin extended release which she was able to tolerate.  She previously tried and failed generic metformin immediate release due to nausea.    She was also previously taking Inositol for PCOS    Her baseline workup for PCOS showed elevated free and total testosterone levels of 75 and 10 respectively on May 2023, IGF-I levels were normal prolactin was found to be elevated again at 28, midnight saliva cortisol x 3 on May 16, 2023 was normal at 0.015, 0.026 and 0.070, DHEA-sulfate was normal at 130, and 17 hydroxyprogesterone was not tested.        On follow-up she reports that she has done well in terms of PCOS  She has Nexplanon implant and denies abnormal bleeding  She denies severe hirsutism  She denies galactorrhea  She doesn't have periods due to the Nexplanon  She is tolerating metformin extended release 500mg bid without side effects  She didn't tolerate the max dose of Metformin (1000mg)  She is still on Ozempic 2 mg weekly   She reports  weight loss of 40 lbs since last visit  Her baseline weight was 222 lbs.  Her current weight is 178 lbs.  She lost > 5%      Her most recent prolactin is normal at 16 on March 2024 and total testosterone was 42 on 3/2024  Free testosterone was 5 on 3/2024         Patient's medications, allergies, and social histories were reviewed and updated as appropriate.      ROS:     CONS:     No fever, no chills   EYES:     No diplopia, no blurry vision   CV:            No chest pain, no palpitations   PULM:     No SOB, no cough, no hemoptysis.   GI:            No nausea, no vomiting, no diarrhea, no constipation   ENDO:     No polyuria, no polydipsia, no heat intolerance, no cold intolerance       Past Medical History:  Problem List:  2024-05: Panic attacks  2024-05: Nexplanon in place  2023-11: ASCUS with positive high risk HPV cervical  2023-08: Tear of medial meniscus of knee  2023-05: Hyperprolactinemia (HCC)  2021-08: Pharyngitis due to Streptococcus species  2021-07: Attention deficit hyperactivity disorder (ADHD), inattentive   type, mild  2020-11: Prediabetes  2020-10: Concentration deficit  2019-05: BRCA gene mutation negative  2019-04: Family history of systemic lupus erythematosus  2019-04: MDD (major depressive disorder), recurrent, in partial   remission ([facility])  2019-04: Type 2 diabetes mellitus ([facility])  2019-04: Family history of ovarian cancer  2019-04: Family history of breast cancer  2019-04: Migraine with aura and without status migrainosus, not   intractable  2019-04: Positive TB test  2018-06: Obesity (BMI 35.0-39.9 without comorbidity)  2018-02: SANDI (generalized anxiety disorder)  2018-02: PCOS (polycystic ovarian syndrome)  2018-02: Eczema  2018-02: Anxiety  2018-02: Chronic low back pain      Past Surgical History:  Past Surgical History:   Procedure Laterality Date    SD LARYNGOSCOPY,DIRECT,DIAGNOSTIC Bilateral 04/25/2024    Procedure: SEPTOPLASTY, LARYNGOSCOPY, DIRECT Without biopsy, BILATERAL EXCISION INFERIOR TURBINATE PARTIAL;  Surgeon: John Mercer M.D.;  Location: SURGERY SAME DAY [facility];  Service: Ent    SD EXCISION TURBINATE Bilateral 04/25/2024    Procedure: EXCISION, NASAL TURBINATE;  Surgeon: John Mercer M.D.;  Location: SURGERY SAME DAY [facility];  Service: Ent    OTHER      back surgery        Allergies:  Cat hair extract     Social History:  Social History     Tobacco Use    Smoking status: Never    Smokeless tobacco: Never   Vaping  "Use    Vaping status: Never Used   Substance Use Topics    Alcohol use: Yes     Alcohol/week: 0.6 oz     Types: 1 Shots of liquor per week     Comment: rarely, one drink every 2 months, social    Drug use: No        Family History:   family history includes Alcohol abuse in her brother and father; Anxiety disorder in her brother and mother; Breast Cancer in her paternal aunt and paternal aunt; Colon Cancer in her maternal aunt; Depression in her brother and mother; Diabetes in her mother; Heart Disease in her maternal grandmother; Ovarian Cancer in her mother; Pancreatic Cancer in her paternal grandmother; Rheumatologic Disease in her mother; Schizophrenia in her brother.      PHYSICAL EXAM:   Vital signs: /60 (BP Location: Left arm, Patient Position: Sitting, BP Cuff Size: Adult long)   Pulse 88   Ht 1.6 m (5' 3\")   Wt 80.7 kg (178 lb)   SpO2 98%   BMI 31.53 kg/m²   GENERAL: Well-developed, well-nourished in no apparent distress.   EYE:  No ocular asymmetry, PERRLA  HENT: Pink, moist mucous membranes.    NECK: No thyromegaly.   CARDIOVASCULAR:  No murmurs  LUNGS: Clear breath sounds  ABDOMEN: Soft, nontender   EXTREMITIES: No clubbing, cyanosis, or edema.   NEUROLOGICAL: No gross focal motor abnormalities   LYMPH: No cervical adenopathy seen  SKIN: No rashes, lesions.       Labs:  Lab Results   Component Value Date/Time    SODIUM 138 03/04/2024 07:52 AM    POTASSIUM 3.9 03/04/2024 07:52 AM    CHLORIDE 105 03/04/2024 07:52 AM    CO2 23 03/04/2024 07:52 AM    ANION 10.0 03/04/2024 07:52 AM    GLUCOSE 100 (H) 03/04/2024 07:52 AM    BUN 7 (L) 03/04/2024 07:52 AM    CREATININE 0.60 03/04/2024 07:52 AM    CALCIUM 9.1 03/04/2024 07:52 AM    ASTSGOT 21 03/04/2024 07:52 AM    ALTSGPT 28 03/04/2024 07:52 AM    TBILIRUBIN 0.6 03/04/2024 07:52 AM    ALBUMIN 4.0 03/04/2024 07:52 AM    TOTPROTEIN 7.0 03/04/2024 07:52 AM    GLOBULIN 3.0 03/04/2024 07:52 AM    AGRATIO 1.3 03/04/2024 07:52 AM       Lab Results " "  Component Value Date/Time    SODIUM 138 03/04/2024 0752    POTASSIUM 3.9 03/04/2024 0752    CHLORIDE 105 03/04/2024 0752    CO2 23 03/04/2024 0752    GLUCOSE 100 (H) 03/04/2024 0752    BUN 7 (L) 03/04/2024 0752    CREATININE 0.60 03/04/2024 0752    CALCIUM 9.1 03/04/2024 0752    ANION 10.0 03/04/2024 0752       Lab Results   Component Value Date/Time    CHOLSTRLTOT 156 05/25/2022 0809    TRIGLYCERIDE 77 05/25/2022 0809    HDL 46 05/25/2022 0809    LDL 95 05/25/2022 0809       Lab Results   Component Value Date/Time    TSHULTRASEN 1.000 04/27/2023 0738     No results found for: \"FREET4\"  No results found for: \"FREET3\"  No results found for: \"THYSTIMIG\"    No results found for: \"MICROSOMALA\"      Imaging:      ASSESSMENT/PLAN:     1. PCOS (polycystic ovarian syndrome)  Controlled she denies severe hirsutism testosterone levels are better  Follow-up in 7 months with repeat androgen levels we will also make sure that her 17 hydroxyprogesterone and other androgens are normal we will check androstenedione as well    2. Obesity (BMI 35.0-39.9 without comorbidity)  Stable  She has lost greater than 5% of her baseline weight and has a positive response to medical therapy  She has obesity at baseline with a BMI greater than 30 and had a workup for hypercortisolism that was negative.  She tried and failed diet and exercise.  Thyroid function is normal.  Continue Ozempic 2 mg weekly.  Combined with diet and exercise.  Continue monitoring weight    3. Hyperprolactinemia (HCC)  Stable there is no recurrence of hyperprolactinemia and she does not have galactorrhea  We will check a prolactin again in 7 months    4. Vitamin D deficiency  Stable   Vitamin D labs were reviewed with patient  Continue current supplements  Continue monitoring levels       5. History of female hirsutism  Controlled see plan above will rechecking her androgens      Return in about 7 months (around 4/10/2025).      Thank you kindly for allowing me to " participate in the thyroid care plan for this patient.    Guido Keating MD, FACE, ECNU      CC:   Pcp Pt States None

## 2024-09-11 ENCOUNTER — OFFICE VISIT (OUTPATIENT)
Dept: PHYSICAL MEDICINE AND REHAB | Facility: MEDICAL CENTER | Age: 39
End: 2024-09-11
Payer: COMMERCIAL

## 2024-09-11 VITALS
WEIGHT: 178.57 LBS | SYSTOLIC BLOOD PRESSURE: 112 MMHG | HEART RATE: 79 BPM | TEMPERATURE: 98.9 F | DIASTOLIC BLOOD PRESSURE: 76 MMHG | BODY MASS INDEX: 31.64 KG/M2 | HEIGHT: 63 IN

## 2024-09-11 DIAGNOSIS — M47.9 SPONDYLOSIS: ICD-10-CM

## 2024-09-11 DIAGNOSIS — M47.816 FACET ARTHRITIS OF LUMBAR REGION: ICD-10-CM

## 2024-09-11 DIAGNOSIS — M79.10 MYALGIA: ICD-10-CM

## 2024-09-11 DIAGNOSIS — M43.14 SPONDYLOLISTHESIS OF THORACIC REGION: ICD-10-CM

## 2024-09-11 DIAGNOSIS — Z71.82 EXERCISE COUNSELING: ICD-10-CM

## 2024-09-11 DIAGNOSIS — M70.62 GREATER TROCHANTERIC BURSITIS OF LEFT HIP: ICD-10-CM

## 2024-09-11 DIAGNOSIS — M54.6 DORSALGIA OF THORACIC REGION: ICD-10-CM

## 2024-09-11 DIAGNOSIS — E66.9 OBESITY (BMI 30-39.9): ICD-10-CM

## 2024-09-11 PROCEDURE — 20552 NJX 1/MLT TRIGGER POINT 1/2: CPT | Mod: 59 | Performed by: GENERAL PRACTICE

## 2024-09-11 PROCEDURE — 99214 OFFICE O/P EST MOD 30 MIN: CPT | Mod: 25 | Performed by: GENERAL PRACTICE

## 2024-09-11 PROCEDURE — 1125F AMNT PAIN NOTED PAIN PRSNT: CPT | Performed by: GENERAL PRACTICE

## 2024-09-11 PROCEDURE — 20611 DRAIN/INJ JOINT/BURSA W/US: CPT | Mod: LT | Performed by: GENERAL PRACTICE

## 2024-09-11 PROCEDURE — 3074F SYST BP LT 130 MM HG: CPT | Performed by: GENERAL PRACTICE

## 2024-09-11 PROCEDURE — 3078F DIAST BP <80 MM HG: CPT | Performed by: GENERAL PRACTICE

## 2024-09-11 RX ORDER — DEXAMETHASONE SODIUM PHOSPHATE 4 MG/ML
4 INJECTION, SOLUTION INTRA-ARTICULAR; INTRALESIONAL; INTRAMUSCULAR; INTRAVENOUS; SOFT TISSUE ONCE
Status: COMPLETED | OUTPATIENT
Start: 2024-09-11 | End: 2024-09-11

## 2024-09-11 RX ADMIN — DEXAMETHASONE SODIUM PHOSPHATE 4 MG: 4 INJECTION, SOLUTION INTRA-ARTICULAR; INTRALESIONAL; INTRAMUSCULAR; INTRAVENOUS; SOFT TISSUE at 17:20

## 2024-09-11 ASSESSMENT — PATIENT HEALTH QUESTIONNAIRE - PHQ9
SUM OF ALL RESPONSES TO PHQ QUESTIONS 1-9: 12
CLINICAL INTERPRETATION OF PHQ2 SCORE: 2
5. POOR APPETITE OR OVEREATING: 1 - SEVERAL DAYS

## 2024-09-11 ASSESSMENT — FIBROSIS 4 INDEX: FIB4 SCORE: .4535573676110726727

## 2024-09-11 ASSESSMENT — PAIN SCALES - GENERAL: PAINLEVEL: 7=MODERATE-SEVERE PAIN

## 2024-09-11 NOTE — PROGRESS NOTES
Physiatry (Physical Medicine and  Rehabilitation)       Patient Name: Naomi Peters   Patient : 1985  PCP: BERNICE Díaz  MRN: [id number]     Date of service: See epic    Chief complaint:   Chief Complaint   Patient presents with    Follow-Up     Spondylosis        Referring provider: Christiane Dickerson*    HISTORY    Naomi Peters is a 38 y.o. pleasant female  who presents with a follow up.          Medical records review:  I reviewed the note from the referring provider Christiane Dickerson* including the note dated 24.    Prior Procedures:   Plasma disc compression [place] 2009  Prior injections in [place].     HPI:    Chronic Low back pain: Pain level still 6-7 out of 10.  Described as sharp.  No radiation or referral of pain.  Has attempted physician provided therapy.  Attempted medications.  Thermal modalities effective in controlling pain.  Left hip pain: Described as aching.  No radiation or referral to pain. Has attempted physician provided therapy.  Attempted medications.  Worse when lying on her side.  Would like to proceed with injections    ROS:   Fever, Chills, Sweats: Denies  Involuntary Weight Loss: Denies  Endorses depression and anxiety.  See HPI.   All other systems reviewed and negative.     Psychological testing for pain as depression and pain commonly coexist and need to both be treated.     Opioid Risk Score: 1      Interpretation of Opioid Risk Score   Score 0-3 = Low risk of abuse. Do UDS at least once per year.  Score 4-7 = Moderate risk of abuse. Do UDS 1-4 times per year.  Score 8+ = High risk of abuse. Refer to specialist.    PHQ      2024     8:00 AM 6/3/2024    11:20 AM 2024     4:40 PM   Depression Screen (PHQ-2/PHQ-9)   PHQ-2 Total Score 0 0 2   PHQ-9 Total Score   12       Interpretation of PHQ-9 Total Score   Score Severity   1-4 No Depression   5-9 Mild Depression   10-14 Moderate Depression    15-19 Moderately Severe Depression   20-27 Severe Depression      OCCUPATIONAL history: preK teacher      GOALS OF TREATMENT: Symptom/Pain relief. improve function.        PMHx:   Past Medical History:   Diagnosis Date    Anxiety     Depression     Eczema 02/07/2018    Migraine     PCOS (polycystic ovarian syndrome)        PSHx:   Past Surgical History:   Procedure Laterality Date    MT LARYNGOSCOPY,DIRECT,DIAGNOSTIC Bilateral 04/25/2024    Procedure: SEPTOPLASTY, LARYNGOSCOPY, DIRECT Without biopsy, BILATERAL EXCISION INFERIOR TURBINATE PARTIAL;  Surgeon: John Mercer M.D.;  Location: SURGERY SAME DAY [facility];  Service: Ent    MT EXCISION TURBINATE Bilateral 04/25/2024    Procedure: EXCISION, NASAL TURBINATE;  Surgeon: John Mercer M.D.;  Location: SURGERY SAME DAY [facility];  Service: Ent    OTHER      back surgery       Family history   Family History   Problem Relation Age of Onset    Diabetes Mother     Anxiety disorder Mother     Depression Mother     Ovarian Cancer Mother     Rheumatologic Disease Mother         Lupus    Alcohol abuse Father     Alcohol abuse Brother     Schizophrenia Brother     Anxiety disorder Brother     Depression Brother     Colon Cancer Maternal Aunt     Breast Cancer Paternal Aunt     Breast Cancer Paternal Aunt     Heart Disease Maternal Grandmother     Pancreatic Cancer Paternal Grandmother        Medications: reviewed on epic.   Outpatient Medications Marked as Taking for the 9/11/24 encounter (Office Visit) with Sylvester Laureano D.O.   Medication Sig Dispense Refill    metFORMIN ER (GLUCOPHAGE XR) 500 MG TABLET SR 24 HR Take 1 Tablet by mouth 2 times a day. 180 Tablet 3    Semaglutide, 2 MG/DOSE, (OZEMPIC, 2 MG/DOSE,) 8 MG/3ML Solution Pen-injector DIAL AND INJECT UNDER THE SKIN 2 MG EVERY 7 DAYS 3 mL 6    Ciclopirox 1 % Shampoo Use daily for 1-2 weeks until resolved, then can use 2-3 times per week as needed 120 mL 3    tretinoin (RETIN-A) 0.025 % cream AAA at bedtime, pea  sized amount after moisturizer, start 2-3 times per week, increase as tolerated 45 g 1    venlafaxine ER (EFFEXOR) 225 MG TABLET SR 24 HR tablet Take 225 mg by mouth every day.      busPIRone (BUSPAR) 30 MG tablet 30 mg 2 times a day.      LORazepam (ATIVAN) 1 MG Tab Take 1 mg by mouth every 6 hours as needed.       Current Facility-Administered Medications for the 9/11/24 encounter (Office Visit) with Sylvester Laureano D.O.   Medication Dose Route Frequency Provider Last Rate Last Admin    dexamethasone (Decadron) injection 4 mg  4 mg Injection Once         dexamethasone (Decadron) injection 4 mg  4 mg Injection Once             Allergies:   Allergies   Allergen Reactions    Cat Hair Extract      Other reaction(s): Not available    Cat Pelt Standardized Allergenic Extract       Social Hx:   Social History     Socioeconomic History    Marital status: Single     Spouse name: Not on file    Number of children: Not on file    Years of education: Not on file    Highest education level: Associate degree: academic program   Occupational History    Not on file   Tobacco Use    Smoking status: Never    Smokeless tobacco: Never   Vaping Use    Vaping status: Never Used   Substance and Sexual Activity    Alcohol use: Yes     Alcohol/week: 0.6 oz     Types: 1 Shots of liquor per week     Comment: rarely, one drink every 2 months, social    Drug use: No    Sexual activity: Not Currently     Partners: Male     Birth control/protection: Condom, Implant   Other Topics Concern    Not on file   Social History Narrative    Not on file     Social Determinants of Health     Financial Resource Strain: Patient Declined (3/14/2024)    Overall Financial Resource Strain (CARDIA)     Difficulty of Paying Living Expenses: Patient declined   Food Insecurity: Unknown (3/14/2024)    Hunger Vital Sign     Worried About Running Out of Food in the Last Year: Never true     Ran Out of Food in the Last Year: Patient declined   Transportation Needs: No  "Transportation Needs (3/14/2024)    PRAPARE - Transportation     Lack of Transportation (Medical): No     Lack of Transportation (Non-Medical): No   Physical Activity: Insufficiently Active (3/14/2024)    Exercise Vital Sign     Days of Exercise per Week: 2 days     Minutes of Exercise per Session: 30 min   Stress: Stress Concern Present (3/14/2024)    [employer] - Occupational Stress Questionnaire     Feeling of Stress : To some extent   Social Connections: Moderately Isolated (3/14/2024)    Social Connection and Isolation Panel [NHANES]     Frequency of Communication with Friends and Family: Three times a week     Frequency of Social Gatherings with Friends and Family: Twice a week     Attends Taoism Services: More than 4 times per year     Active Member of Clubs or Organizations: No     Attends Club or Organization Meetings: Patient declined     Marital Status:    Intimate Partner Violence: Not on file   Housing Stability: High Risk (3/14/2024)    Housing Stability Vital Sign     Unable to Pay for Housing in the Last Year: Yes     Number of Places Lived in the Last Year: 1     Unstable Housing in the Last Year: No         EXAMINATION   Vitals: /76 (BP Location: Right arm, Patient Position: Sitting, BP Cuff Size: Adult)   Pulse 79   Temp 37.2 °C (98.9 °F) (Temporal)   Ht 1.6 m (5' 3\")   Wt 81 kg (178 lb 9.2 oz)   Physical Exam:     Body Habitus: Body mass index is 31.63 kg/m².  Appearance: Well-groomed, well-nourished, not disheveled  Eyes: No scleral icterus to suggest severe liver disease, no proptosis to suggest severe hyperthyroid  ENT -no obvious auditory deficits, no external lesions, moist mucus membranes   Skin -no rashes or lesions noted. No appreciable skin breakdown on exposed skin areas.    Respiratory-  breathing comfortably on room air, no audible wheezing, full sentences  Cardiovascular- No lower extremity edema noted.   Psychiatric- alert and " oriented, calm, comfortable, cooperative     Musculoskeletal and Neuro:  Gait and station - normal gait with reciprocal pattern,  no presence/use of ambulatory device, no arm assistance with sit-to-stand, nonantalgic. no loss of balance during exam.  No change in patient's demeanor with exam.    Grossly normal cranial nerve exam  Coordination grossly intact     Thoracic/Lumbar Spine/Sacral Spine/Hips   Inspection: No evidence of atrophy in bilateral lower extremities throughout     ROM: full  active range of motion with flexion, lateral flexion, and rotation bilaterally.   There is full  active range of motion with lumbar extension without pain.    There is pain with facet loading maneuver (extension rotation) with axial low back pain on the BILATERAL side(s)    Palpation:   nonfocal diffuse tenderness to palpation in midline at T1-T4 . tenderness to palpation diffusely in the left and right of the midline T1-L5, POSITIVE for tenderness to palpation to the para-midline region in the lower lumbar levels.  palpation over SI joint: negative bilaterally  palpation in hip or over the gluteus medius tendon insertion: negative right, positive left     Lumbar spine Special tests  Neuro tension  Straight leg test} negative bilaterally     HIP  FAIR test negative bilaterally   Range of motion in the RIGHT hip is full  in flexion, extension, abduction, internal rotation, external rotation.  Range of motion in the LEFT hip is full  in flexion, extension, abduction, internal rotation, external rotation.  Barby negative bilaterally       SI joint tests  Observation patient sits on one buttocks: Negative  SI joint compression negative bilaterally    SI joint distraction negative bilaterally  Thigh thrust test positive bilaterally   BARBY test negative bilaterally    Key points for the international standards for neurological classification of spinal cord injury (ISNCSCI) to light touch.   Dermatome R L   L2 2 2   L3 2 2   L4 2 2  "  L5 2 2   S1 2 2     Motor Exam Lower Extremities  ? Myotome R L   Hip flexion L2 5 5   Knee extension L3 5 5   Ankle dorsiflexion L4 5 5   Toe extension L5 5 5   Ankle plantarflexion S1 5 5   Hip Abduction  5 5   Hip Adduction  4+ 4+     Reflexes  Clonus of the ankle negative bilaterally   ? R L   Patella 2+ 2+   Achilles  2+ 2+     MEDICAL DECISION MAKING    Medical records review: see under HPI section.     DATA    Labs:   Lab Results   Component Value Date/Time    SODIUM 138 03/04/2024 07:52 AM    POTASSIUM 3.9 03/04/2024 07:52 AM    CHLORIDE 105 03/04/2024 07:52 AM    CO2 23 03/04/2024 07:52 AM    ANION 10.0 03/04/2024 07:52 AM    GLUCOSE 100 (H) 03/04/2024 07:52 AM    BUN 7 (L) 03/04/2024 07:52 AM    CREATININE 0.60 03/04/2024 07:52 AM    CALCIUM 9.1 03/04/2024 07:52 AM    ASTSGOT 21 03/04/2024 07:52 AM    ALTSGPT 28 03/04/2024 07:52 AM    TBILIRUBIN 0.6 03/04/2024 07:52 AM    ALBUMIN 4.0 03/04/2024 07:52 AM    TOTPROTEIN 7.0 03/04/2024 07:52 AM    GLOBULIN 3.0 03/04/2024 07:52 AM    AGRATIO 1.3 03/04/2024 07:52 AM   ]    No results found for: \"PROTHROMBTM\", \"INR\"     Lab Results   Component Value Date/Time    WBC 5.4 06/08/2024 09:26 AM    RBC 5.04 06/08/2024 09:26 AM    HEMOGLOBIN 15.3 06/08/2024 09:26 AM    HEMATOCRIT 45.1 06/08/2024 09:26 AM    MCV 89.5 06/08/2024 09:26 AM    MCH 30.4 06/08/2024 09:26 AM    MCHC 33.9 06/08/2024 09:26 AM    MPV 9.7 06/08/2024 09:26 AM    NEUTSPOLYS 55.50 05/14/2019 07:46 AM    LYMPHOCYTES 34.40 05/14/2019 07:46 AM    MONOCYTES 7.20 05/14/2019 07:46 AM    EOSINOPHILS 2.10 05/14/2019 07:46 AM    BASOPHILS 0.60 05/14/2019 07:46 AM        Lab Results   Component Value Date/Time    HBA1C 5.4 04/27/2023 07:38 AM        Imaging:   I personally reviewed following images, these are my reads  Lumbar x-ray 5/21/2024: Mild L4-5 and L5-S1 facet joint arthrosis.  Degenerative changes  Left knee x-ray 10/20/2023: No acute bony processes: Very mild degenerative changes    MRI L-Spine on " 24: mild degenerative changes and facet arthropathy. No areas of high-grade central canal or neuroforaminal stenosis.       24 thoracic xr: mild degen changes  24 hip xr: no acute osseous abnormalities    IMAGING radiology reads. I reviewed the following radiology reads                               Results for orders placed during the hospital encounter of 23    DX-KNEE 3 VIEWS LEFT    Impression  No evidence of acute fracture or dislocation.    Results for orders placed during the hospital encounter of 23    DX-KNEE COMPLETE 4+ LEFT    Impression  Normal left knee radiography.   Results for orders placed during the hospital encounter of 24    DX-LUMBAR SPINE-2 OR 3 VIEWS    Impression  1.  Transitional thoracolumbar vertebra considered T12 this dictation.    2.  Mild/moderate L4-5 degenerative disc disease and mild L4-5 and L5-S1 facet joint arthrosis.                           ASSESSMENT AND PLAN:  Naomi Peters   : 1985   Past medical history of prediabetes, panic attacks, migraines, major depressive disorder, general anxiety disorder, chronic low back pain, BMI 31-39    1. Greater trochanteric bursitis of left hip  Consent for all Surgical, Special Diagnostic or Therapeutic Procedures    dexamethasone (Decadron) injection 4 mg      2. Myalgia  Consent for all Surgical, Special Diagnostic or Therapeutic Procedures    dexamethasone (Decadron) injection 4 mg      3. Spondylosis        4. Facet arthritis of lumbar region        5. Exercise counseling        6. Dorsalgia of thoracic region        7. Spondylolisthesis of thoracic region        8. Obesity (BMI 30-39.9)            Patient with historical and clinical evidence consistent with myalgia of gluteus medius at the level of L4 left side and left greater trochanter syndrome with both having reproducible pain to palpation.  X-ray and MRI with no urgent or emergent findings or obvious acute osseous abnormalities.   Patient has failed conservative management with medications and physician driven therapy.  Patient would like to proceed with trigger point injections.    Past medical history of, panic attacks, major depressive disorder: PHQ9 + with no suicidal ideation already established with behavioral health    Dietary and exercising counseling discussed.  Extensive discussion regarding treatment options, at this time recommending the following:    Orders Placed This Encounter    dexamethasone (Decadron) injection 4 mg    dexamethasone (Decadron) injection 4 mg    Consent for all Surgical, Special Diagnostic or Therapeutic Procedures         PLAN  -Medications/Modalities:    No changes in medications  -Therapy (PT/OT/Aquatherapy): continue to focus on strengthening and stretching    -Home exercise program: encouraged    -Office Injections: will perform with this visit   Patient would like to proceed with injection of left greater trochanter and left gluteus medius.  The risks, benefits, and alternatives to this procedure were discussed and the patient wishes to proceed with the procedure. Risks include but are not limited to damage to surrounding structures, infection, bleeding, worsening of pain which can be permanent, and weakness which can be permanent. Benefits include pain relief and improved function. Alternatives include not doing the procedure.   Discussed that for safety reasons to minimize systemic SE of steroids which can include altering the HPA axis, increase glucose levels, insomnia, immunosuppresion, irritability, weight gain, deteriorate cartilage, and decrease bone mineral density, I would ideally space out steroid injections by at least 3 months.  -Diagnostic workup: reviewed today as above;   -Referrals: NA    Follow-up: Follow-up in 2 weeks postprocedure    Patient expressed understanding of the management plan. Patient (and Family Members) was/were encouraged to call if any worries, issues, problems or  concerns prior to the next visit     Please note that this dictation was created using voice recognition software. I have made every reasonable attempt to correct obvious errors but there may be errors of grammar and content that I may have overlooked prior to finalization of this note.      Sylvester Laureano, DO  Physical Medicine and Rehabilitation  [facility]         TIARA Daniels.   CC Christiane Dickerson*

## 2024-09-11 NOTE — PROCEDURES
Date of Service: 9/11/2024    Physician/s: Sylvester Laureano D.O.      Pre-operative Diagnosis: LEFT  greater trochanteric bursitis    Post-operative Diagnosis: LEFT  greater trochanteric bursitis    Procedure: LEFT  greater trochanteric bursa injection ultrasound-guided    Description of procedure:    The risks, benefits, and alternatives of the procedure were reviewed and discussed with the patient.  Written informed consent was freely obtained. A pre-procedural time-out was conducted by the physician verifying patient’s identity, procedure to be performed, procedure site and side, and allergy verification. Appropriate equipment was determined to be in place for the procedure.     No sedation was used for this procedure.     In the office suite the patient was placed in a lateral position with his  LEFT  side up, and the skin was prepped and draped in the usual sterile fashion. The ultrasound probe was placed over the LEFT  greater trochanter and the joint space was located and the target for injection was marked.  A 27g 1.5 inch needle was placed into skin and advanced under ultrasound guidance into the greater trochanteric bursa. Following negative aspiration, approx 2mL of 1% lidocaine, 2ml of bupivicaine with 1mL of  4mg/mL dexamethasone was then injected into the joint, and the needle was subsequently removed intact after restyleted. The patient's hip was wiped with a 4x4 gauze, the area was cleansed with alcohol prep, and a bandaid was applied. There were no complications noted. The images were saved for permanent storage.      Preprocedural pain: 7/10  Postprocedural pain: 0/10        Postprocedure care explained to the patient regarding icing as well as stretching activity.      Instructed patient to avoid submerging in water for at least 24 to 48 hours.    Instructed patient to monitor injection site for signs of infection.    Ultrasound guidance and Major joint/bursa:  20611 (knee, hip, shoulder, trochanteric  bursa, subacromial bursa, pes anserine bursa)              TRIGGER POINT INJECTION     Date of Service: 9/11/2024     Physician/s: Sylvester Laureano D.O.    Pre-operative Diagnosis: Myalgia (M79.1)    Post-operative Diagnosis: Myalgia (M79.1)    Procedure: LEFT gluteus medius (x2) trigger point injections    The risks, benefits, and alternatives of the procedure were reviewed and discussed with the patient.  Written informed consent was freely obtained. A pre-procedural time-out was conducted by the physician verifying patient’s identity, procedure to be performed, procedure site and side, and allergy verification. Appropriate equipment was determined to be in place for the procedure.     The site as well as the side was identified  and the patient was counseled on the procedure along with the side effects, which are not limited to increased pain, hematoma, atrophy of tissues, infection, nerve damage, CRPS (RSD), skin dimpling, loss of skin/subcutaneous tissue/fat tissue, paraplegia, tetraplegia, septicemia, and other known and unknown effects which might be harmful/fatal.  The patient understood the same and verbalized agreement. It was carried out as below:     Solution used:   -total 5ml mixed; 2ml 1% lidocaine, 2ml 0.25% bupivacaine, 1ml 4mg/ml dexamethasone    Total solution used:  4    Injected: Myofascial trigger points at the above muscle(s) was/(were) inactivated via needling procedure under aseptic precautions.      In the office suite exam room the patient was placed in a prone position and the skin areas for injection over the above muscle(s) was/were marked. A solution was prepared as above. Ultrasound was confirmed to view the adjacent structures for blood vessels and nerves and to confirm the needle path was not within the structures nor was it too deep to be within the lung. A 27g needle was placed into each of the markings at the areas above under ultrasound guidance with an in plane approach.. After  negative aspiration, approximately a 1.5 mL of the above solution was injected. The needle was removed intact after each trigger point injection, and the patient's back was covered with a 4x4 gauze, gentle soft tissue massage, and a dressing was applied. There were no complications noted. The images were uploaded to our media tab for permanent storage.    Postprocedure care explained to the patient regarding icing as well as stretching activity.      Instructed patient to avoid submerging in water for at least 24 to 48 hours.    Instructed patient to monitor injection site for signs of infection.    Preprocedural pain: 7/10  Postprocedural pain: 0/10    Trigger point injection, 1 muscles 46370  Ultrasonic guidance for needle placement: [id number]            Sylvester Laureano DO  Physical Medicine and Rehabilitation  [facility]

## 2024-09-26 ENCOUNTER — OFFICE VISIT (OUTPATIENT)
Dept: PHYSICAL MEDICINE AND REHAB | Facility: MEDICAL CENTER | Age: 39
End: 2024-09-26
Payer: COMMERCIAL

## 2024-09-26 VITALS
DIASTOLIC BLOOD PRESSURE: 64 MMHG | TEMPERATURE: 97.2 F | HEART RATE: 94 BPM | BODY MASS INDEX: 31.54 KG/M2 | SYSTOLIC BLOOD PRESSURE: 110 MMHG | HEIGHT: 63 IN | WEIGHT: 178 LBS | OXYGEN SATURATION: 98 %

## 2024-09-26 DIAGNOSIS — M47.816 FACET ARTHRITIS OF LUMBAR REGION: ICD-10-CM

## 2024-09-26 DIAGNOSIS — M70.62 GREATER TROCHANTERIC BURSITIS OF LEFT HIP: ICD-10-CM

## 2024-09-26 DIAGNOSIS — M79.10 MYALGIA: ICD-10-CM

## 2024-09-26 DIAGNOSIS — M47.9 SPONDYLOSIS: ICD-10-CM

## 2024-09-26 RX ORDER — DEXAMETHASONE SODIUM PHOSPHATE 4 MG/ML
4 INJECTION, SOLUTION INTRA-ARTICULAR; INTRALESIONAL; INTRAMUSCULAR; INTRAVENOUS; SOFT TISSUE ONCE
Status: SHIPPED | OUTPATIENT
Start: 2024-09-26 | End: 2024-09-27

## 2024-09-26 ASSESSMENT — FIBROSIS 4 INDEX: FIB4 SCORE: .4535573676110726727

## 2024-09-26 ASSESSMENT — PAIN SCALES - GENERAL: PAINLEVEL: 4=SLIGHT-MODERATE PAIN

## 2024-09-26 NOTE — PROCEDURES
TRIGGER POINT INJECTION     Date of Service: 9/26/2024     Physician/s: Sylvester Laureano D.O.    Pre-operative Diagnosis: Myalgia (M79.1)    Post-operative Diagnosis: Myalgia (M79.1)    Procedure: RIGHT right latissimus dorsi/lumbar paraspinal trigger point injections      The risks, benefits, and alternatives of the procedure were reviewed and discussed with the patient.  Written informed consent was freely obtained. A pre-procedural time-out was conducted by the physician verifying patient’s identity, procedure to be performed, procedure site and side, and allergy verification. Appropriate equipment was determined to be in place for the procedure.     The site as well as the side was identified  and the patient was counseled on the procedure along with the side effects, which are not limited to increased pain, hematoma, atrophy of tissues, infection, nerve damage, CRPS (RSD), skin dimpling, loss of skin/subcutaneous tissue/fat tissue, paraplegia, tetraplegia, septicemia, and other known and unknown effects which might be harmful/fatal.  The patient understood the same and verbalized agreement. It was carried out as below:     Solution used:   -total 7ml mixed; 3ml 1% lidocaine, 3ml 0.25% bupivacaine, 1ml 4mg/ml dexamethasone    Total solution used:  3    Injected: Myofascial trigger points at the above muscle(s) was/(were) inactivated via needling procedure under aseptic precautions.      In the office suite exam room the patient was placed in a prone position and the skin areas for injection over the above muscle(s) was/were marked. A solution was prepared as above. . A 27g needle was placed into each of the markings at the areas above under ultrasound guidance with an in plane approach.. After negative aspiration, approximately a 1.5 mL of the above solution was injected. The needle was removed intact after each trigger point injection, and the patient's back was covered with a 4x4 gauze, gentle soft tissue massage,  and a dressing was applied. There were no complications noted.     Postprocedure care explained to the patient regarding icing as well as stretching activity.      Instructed patient to avoid submerging in water for at least 24 to 48 hours.    Instructed patient to monitor injection site for signs of infection.    Preprocedural pain: 7/10  Postprocedural pain: 0-1/10    Trigger point injection, 1 muscles 46862  Ultrasonic guidance for needle placement: [id number]      Sylvester Laureano DO  Physical Medicine and Rehabilitation  [facility]

## 2024-09-26 NOTE — PROGRESS NOTES
Physiatry (Physical Medicine and  Rehabilitation)       Patient Name: Naomi Peters   Patient : 1985  PCP: BERNICE Díaz  MRN: [id number]     Date of service: See epic    Chief complaint:   Chief Complaint   Patient presents with    Follow-Up     2wk fv         Referring provider: Christiane Dickerson*    HISTORY    Naomi Peters is a 38 y.o. pleasant female  who presents with a follow up.          Medical records review:  I reviewed the note from the referring provider Christiane Dickerson* including the note dated 24.    Prior Procedures:   Plasma disc compression Wallace/[place] 2009  Prior injections in [place].   24 LEFT  greater trochanteric bursa injection ultrasound-guided     HPI:    2024 since last visit/injection 24 LEFT  greater trochanteric bursa injection ultrasound-guided , patient has reported 100% improvement and improvement of ADLs with twisting and bending of her leg for 2 weeks.     In addition has right sided low back pain. Pain level 10/10 when sleeping.  Current pain level 6/10.  No radiation or referred pain.  No other associated symptoms. Worst when lying on her right side.  Affecting sleep.   Would like to proceed with injections    ROS:   Fever, Chills, Sweats: Denies  Involuntary Weight Loss: Denies  Endorses depression and anxiety.  See HPI.   All other systems reviewed and negative.     Psychological testing for pain as depression and pain commonly coexist and need to both be treated.     Opioid Risk Score: 1      Interpretation of Opioid Risk Score   Score 0-3 = Low risk of abuse. Do UDS at least once per year.  Score 4-7 = Moderate risk of abuse. Do UDS 1-4 times per year.  Score 8+ = High risk of abuse. Refer to specialist.    PHQ      2024     8:00 AM 6/3/2024    11:20 AM 2024     4:40 PM   Depression Screen (PHQ-2/PHQ-9)   PHQ-2 Total Score 0 0 2   PHQ-9 Total Score   12       Interpretation  of PHQ-9 Total Score   Score Severity   1-4 No Depression   5-9 Mild Depression   10-14 Moderate Depression   15-19 Moderately Severe Depression   20-27 Severe Depression      OCCUPATIONAL history: preK teacher      GOALS OF TREATMENT: Symptom/Pain relief. improve function.        PMHx:   Past Medical History:   Diagnosis Date    Anxiety     Depression     Eczema 02/07/2018    Migraine     PCOS (polycystic ovarian syndrome)        PSHx:   Past Surgical History:   Procedure Laterality Date    LA LARYNGOSCOPY,DIRECT,DIAGNOSTIC Bilateral 04/25/2024    Procedure: SEPTOPLASTY, LARYNGOSCOPY, DIRECT Without biopsy, BILATERAL EXCISION INFERIOR TURBINATE PARTIAL;  Surgeon: John Mercer M.D.;  Location: SURGERY SAME DAY [facility];  Service: Ent    LA EXCISION TURBINATE Bilateral 04/25/2024    Procedure: EXCISION, NASAL TURBINATE;  Surgeon: John Mercer M.D.;  Location: SURGERY SAME DAY [facility];  Service: Ent    OTHER      back surgery       Family history   Family History   Problem Relation Age of Onset    Diabetes Mother     Anxiety disorder Mother     Depression Mother     Ovarian Cancer Mother     Rheumatologic Disease Mother         Lupus    Alcohol abuse Father     Alcohol abuse Brother     Schizophrenia Brother     Anxiety disorder Brother     Depression Brother     Colon Cancer Maternal Aunt     Breast Cancer Paternal Aunt     Breast Cancer Paternal Aunt     Heart Disease Maternal Grandmother     Pancreatic Cancer Paternal Grandmother        Medications: reviewed on epic.   Outpatient Medications Marked as Taking for the 9/26/24 encounter (Office Visit) with Sylvester Laureano D.O.   Medication Sig Dispense Refill    metFORMIN ER (GLUCOPHAGE XR) 500 MG TABLET SR 24 HR Take 1 Tablet by mouth 2 times a day. 180 Tablet 3    Semaglutide, 2 MG/DOSE, (OZEMPIC, 2 MG/DOSE,) 8 MG/3ML Solution Pen-injector DIAL AND INJECT UNDER THE SKIN 2 MG EVERY 7 DAYS 3 mL 6    Ciclopirox 1 % Shampoo Use daily for 1-2 weeks until resolved,  then can use 2-3 times per week as needed 120 mL 3    tretinoin (RETIN-A) 0.025 % cream AAA at bedtime, pea sized amount after moisturizer, start 2-3 times per week, increase as tolerated 45 g 1    venlafaxine ER (EFFEXOR) 225 MG TABLET SR 24 HR tablet Take 225 mg by mouth every day.      busPIRone (BUSPAR) 30 MG tablet 30 mg 2 times a day.      LORazepam (ATIVAN) 1 MG Tab Take 1 mg by mouth every 6 hours as needed.       Current Facility-Administered Medications for the 9/26/24 encounter (Office Visit) with Sylvester Laureano D.O.   Medication Dose Route Frequency Provider Last Rate Last Admin    dexamethasone (Decadron) injection 4 mg  4 mg Injection Once             Allergies:   Allergies   Allergen Reactions    Cat Hair Extract      Other reaction(s): Not available    Cat Pelt Standardized Allergenic Extract       Social Hx:   Social History     Socioeconomic History    Marital status: Single     Spouse name: Not on file    Number of children: Not on file    Years of education: Not on file    Highest education level: Associate degree: academic program   Occupational History    Not on file   Tobacco Use    Smoking status: Never    Smokeless tobacco: Never   Vaping Use    Vaping status: Never Used   Substance and Sexual Activity    Alcohol use: Yes     Alcohol/week: 0.6 oz     Types: 1 Shots of liquor per week     Comment: rarely, one drink every 2 months, social    Drug use: No    Sexual activity: Not Currently     Partners: Male     Birth control/protection: Condom, Implant   Other Topics Concern    Not on file   Social History Narrative    Not on file     Social Determinants of Health     Financial Resource Strain: Patient Declined (3/14/2024)    Overall Financial Resource Strain (CARDIA)     Difficulty of Paying Living Expenses: Patient declined   Food Insecurity: Unknown (3/14/2024)    Hunger Vital Sign     Worried About Running Out of Food in the Last Year: Never true     Ran Out of Food in the Last Year: Patient  "declined   Transportation Needs: No Transportation Needs (3/14/2024)    PRAPARE - Transportation     Lack of Transportation (Medical): No     Lack of Transportation (Non-Medical): No   Physical Activity: Insufficiently Active (3/14/2024)    Exercise Vital Sign     Days of Exercise per Week: 2 days     Minutes of Exercise per Session: 30 min   Stress: Stress Concern Present (3/14/2024)    [employer] [place] [employer] - Occupational Stress Questionnaire     Feeling of Stress : To some extent   Social Connections: Moderately Isolated (3/14/2024)    Social Connection and Isolation Panel [NHANES]     Frequency of Communication with Friends and Family: Three times a week     Frequency of Social Gatherings with Friends and Family: Twice a week     Attends Caodaism Services: More than 4 times per year     Active Member of Clubs or Organizations: No     Attends Club or Organization Meetings: Patient declined     Marital Status:    Intimate Partner Violence: Not on file   Housing Stability: High Risk (3/14/2024)    Housing Stability Vital Sign     Unable to Pay for Housing in the Last Year: Yes     Number of Places Lived in the Last Year: 1     Unstable Housing in the Last Year: No         EXAMINATION   Vitals: /64 (BP Location: Right arm, Patient Position: Sitting, BP Cuff Size: Large adult)   Pulse 94   Temp 36.2 °C (97.2 °F) (Temporal)   Ht 1.6 m (5' 3\")   Wt 80.7 kg (178 lb)   SpO2 98%   Physical Exam:     Body Habitus: Body mass index is 31.53 kg/m².  Appearance: Well-groomed, well-nourished, not disheveled  Eyes: No scleral icterus to suggest severe liver disease, no proptosis to suggest severe hyperthyroid  ENT -no obvious auditory deficits, no external lesions, moist mucus membranes   Skin -no rashes or lesions noted. No appreciable skin breakdown on exposed skin areas.    Respiratory-  breathing comfortably on room air, no audible wheezing, full sentences  Cardiovascular- No lower " extremity edema noted.   Psychiatric- alert and oriented, calm, comfortable, cooperative     Musculoskeletal and Neuro:  Gait and station - normal gait with reciprocal pattern,  no presence/use of ambulatory device, no arm assistance with sit-to-stand, nonantalgic. no loss of balance during exam.  No change in patient's demeanor with exam.    Grossly normal cranial nerve exam  Coordination grossly intact    Palpation:   negative  for tenderness to palpation to the para-midline region in the lower lumbar levels.  palpation over SI joint: negative bilaterally  palpation in hip or over the gluteus medius tendon insertion: positive right, negative left     Lumbar spine Special tests  Neuro tension  Slump leg test} negative bilaterally     HIP  FAIR test negative bilaterally   Range of motion in the RIGHT hip is full  in flexion, extension, abduction, internal rotation, external rotation.  Range of motion in the LEFT hip is full  in flexion, extension, abduction, internal rotation, external rotation.  Barby negative bilaterally       SI joint tests  Observation patient sits on one buttocks: Negative  SI joint compression negative bilaterally    SI joint distraction negative bilaterally  Thigh thrust test negative bilaterally   BARBY test negative bilaterally    Key points for the international standards for neurological classification of spinal cord injury (ISNCSCI) to light touch.   Dermatome R L   L2 2 2   L3 2 2   L4 2 2   L5 2 2   S1 2 2     Motor Exam Lower Extremities  ? Myotome R L   Hip flexion L2 5 5   Knee extension L3 5 5   Ankle dorsiflexion L4 5 5   Toe extension L5 5 5   Ankle plantarflexion S1 5 5   Hip Abduction  5 5   Hip Adduction  4+ 4+     Reflexes  Clonus of the ankle negative bilaterally   ? R L   Patella 2+ 2+   Achilles  2+ 2+     MEDICAL DECISION MAKING    Medical records review: see under HPI section.     DATA    Labs:   Lab Results   Component Value Date/Time    SODIUM 138 03/04/2024 07:52 AM  "   POTASSIUM 3.9 03/04/2024 07:52 AM    CHLORIDE 105 03/04/2024 07:52 AM    CO2 23 03/04/2024 07:52 AM    ANION 10.0 03/04/2024 07:52 AM    GLUCOSE 100 (H) 03/04/2024 07:52 AM    BUN 7 (L) 03/04/2024 07:52 AM    CREATININE 0.60 03/04/2024 07:52 AM    CALCIUM 9.1 03/04/2024 07:52 AM    ASTSGOT 21 03/04/2024 07:52 AM    ALTSGPT 28 03/04/2024 07:52 AM    TBILIRUBIN 0.6 03/04/2024 07:52 AM    ALBUMIN 4.0 03/04/2024 07:52 AM    TOTPROTEIN 7.0 03/04/2024 07:52 AM    GLOBULIN 3.0 03/04/2024 07:52 AM    AGRATIO 1.3 03/04/2024 07:52 AM   ]    No results found for: \"PROTHROMBTM\", \"INR\"     Lab Results   Component Value Date/Time    WBC 5.4 06/08/2024 09:26 AM    RBC 5.04 06/08/2024 09:26 AM    HEMOGLOBIN 15.3 06/08/2024 09:26 AM    HEMATOCRIT 45.1 06/08/2024 09:26 AM    MCV 89.5 06/08/2024 09:26 AM    MCH 30.4 06/08/2024 09:26 AM    MCHC 33.9 06/08/2024 09:26 AM    MPV 9.7 06/08/2024 09:26 AM    NEUTSPOLYS 55.50 05/14/2019 07:46 AM    LYMPHOCYTES 34.40 05/14/2019 07:46 AM    MONOCYTES 7.20 05/14/2019 07:46 AM    EOSINOPHILS 2.10 05/14/2019 07:46 AM    BASOPHILS 0.60 05/14/2019 07:46 AM        Lab Results   Component Value Date/Time    HBA1C 5.4 04/27/2023 07:38 AM        Imaging:   I personally reviewed following images, these are my reads  Lumbar x-ray 5/21/2024: Mild L4-5 and L5-S1 facet joint arthrosis.  Degenerative changes  Left knee x-ray 10/20/2023: No acute bony processes: Very mild degenerative changes    MRI L-Spine on 7/22/24: mild degenerative changes and facet arthropathy. No areas of high-grade central canal or neuroforaminal stenosis.       6/8/24 thoracic xr: mild degen changes  6/8/24 hip xr: no acute osseous abnormalities    IMAGING radiology reads. I reviewed the following radiology reads                               Results for orders placed during the hospital encounter of 07/30/23    DX-KNEE 3 VIEWS LEFT    Impression  No evidence of acute fracture or dislocation.    Results for orders placed during the " hospital encounter of 23    DX-KNEE COMPLETE 4+ LEFT    Impression  Normal left knee radiography.   Results for orders placed during the hospital encounter of 24    DX-LUMBAR SPINE-2 OR 3 VIEWS    Impression  1.  Transitional thoracolumbar vertebra considered T12 this dictation.    2.  Mild/moderate L4-5 degenerative disc disease and mild L4-5 and L5-S1 facet joint arthrosis.                           ASSESSMENT AND PLAN:  Naomi Malia Lopez   : 1985   Past medical history of prediabetes, panic attacks, migraines, major depressive disorder, general anxiety disorder, chronic low back pain, BMI 31-39    1. Greater trochanteric bursitis of left hip        2. Myalgia  dexamethasone (Decadron) injection 4 mg    Consent for all Surgical, Special Diagnostic or Therapeutic Procedures      3. Spondylosis        4. Facet arthritis of lumbar region              Patient with historical and clinical evidence consistent with myalgia of gluteus medius at the level of L4 left side and left greater trochanter syndrome confirmed by diagnostic and therapeutic injection is 100% pain relief for the past 2 weeks.     Right back/hip pain: Myalgia with reproducible tenderness to palpation; would like to proceed with repeat injection and tolerated corticosteroids  Past medical history of, panic attacks, major depressive disorder: PHQ9 + with no suicidal ideation already established with behavioral health    Dietary and exercising counseling discussed.  Extensive discussion regarding treatment options, at this time recommending the following:    Orders Placed This Encounter    dexamethasone (Decadron) injection 4 mg    Consent for all Surgical, Special Diagnostic or Therapeutic Procedures         PLAN  -Medications/Modalities:    No changes in medications  -Therapy (PT/OT/Aquatherapy): continue to focus on strengthening and stretching    -Home exercise program: encouraged    -Office Injections: will perform with this  visit   Patient would like to proceed with injection of right latissimus dorsi/lumbar paraspinal muscle.  The risks, benefits, and alternatives to this procedure were discussed and the patient wishes to proceed with the procedure. Risks include but are not limited to damage to surrounding structures, infection, bleeding, worsening of pain which can be permanent, and weakness which can be permanent. Benefits include pain relief and improved function. Alternatives include not doing the procedure.   Discussed that for safety reasons to minimize systemic SE of steroids which can include altering the HPA axis, increase glucose levels, insomnia, immunosuppresion, irritability, weight gain, deteriorate cartilage, and decrease bone mineral density, I would ideally space out steroid injections by at least 3 months.  -Diagnostic workup: reviewed today as above;   -Referrals: NA    Follow-up: Follow-up in 2 weeks postprocedure virtual    Patient expressed understanding of the management plan. Patient (and Family Members) was/were encouraged to call if any worries, issues, problems or concerns prior to the next visit     Please note that this dictation was created using voice recognition software. I have made every reasonable attempt to correct obvious errors but there may be errors of grammar and content that I may have overlooked prior to finalization of this note.      Sylvester Laureano, DO  Physical Medicine and Rehabilitation  [facility]         TIARA Daniels.   Christiane Glez*

## 2024-09-29 NOTE — LETTER
[facility] [address] [place] [address]  Phone:  [phone] - Fax:  [phone]   [facility] Progress Report and Disability Certification  Date of Service: 2023   No Show:  No  Date / Time of Next Visit: 2023   Claim Information   Patient Name: Naomi Peters  Claim Number:     Employer: [employer]  Date of Injury: 2023     Insurer / TPA: [employer]  ID / SSN:     Occupation: Teacher  Diagnosis: The encounter diagnosis was Knee strain, left, subsequent encounter.    Medical Information   Related to Industrial Injury? Yes    Subjective Complaints:  DOI: 23: Patient returns to the urgent care after she was evaluated 2023 in the emergency room for reinjury of the left knee after she slipped and fell in the shower also injuring her low back.  Patient has been seen with Mario.  Is scheduled to follow-up with Chinmay Monday of next week.  Patient was advised to follow-up here at the urgent care after she was evaluated in the emergency room.  Continues to have persistent pain in the left knee takes muscle relaxants as needed however does make her sleepy so she only takes in the evening.  Does use crutches intermittently is not using them to date today.   When she slipped and fell in the shower she did injure her back.Denies numbness and tingling lower extremities.       Objective Findings: Left knee: No gross deformities, tenderness palpation to the medial aspect, no effusion, DROM due to pain, gait antalgic.   Pre-Existing Condition(s):     Assessment:   Condition Same    Status: Discharged / Care Transfer  Permanent Disability:No    Plan: Transfer Care    Diagnostics:      Comments:       Disability Information   Status: Released to Restricted Duty    From:  2023  Through: 2023 Restrictions are: Temporary   Physical Restrictions   Sitting:    Standing:  < or = to 1 hr/day Stoopin hrs/day  Bendin hrs/day   Squattin hrs/day Walking:  < or = to 1 hr/day Climbin hrs/day Pushin hrs/day   Pullin hrs/day Other:    Reaching Above Shoulder (L):   Reaching Above Shoulder (R):       Reaching Below Shoulder (L):    Reaching Below Shoulder (R):      Not to exceed Weight Limits   Carrying(hrs):   Weight Limit(lb): < or = to 10 pounds Lifting(hrs):   Weight  Limit(lb): < or = to 10 pounds   Comments: Encourage patient to continue resting, icing, Tyle Motrin as needed for pain, continue with muscle relaxants as needed advised not to take while working and/or driving.  Patient will continue using knee brace and crutches.  We will transfer care to [place] as she has a scheduled appointment next week advised to follow-up as scheduled.  Continue her temporary work restrictions.    Repetitive Actions   Hands: i.e. Fine Manipulations from Grasping:     Feet: i.e. Operating Foot Controls:     Driving / Operate Machinery:     Health Care Provider’s Original or Electronic Signature  TIARA Rocha. Health Care Provider’s Original or Electronic Signature    Juan Sanchez DO MPH     Clinic Name / Location: [address] [facility] [place]  [place] [address] Clinic Phone Number: Dept: [phone]   Appointment Time: 1:00 Pm Visit Start Time: 2:08 PM   Check-In Time:  2:01 Pm Visit Discharge Time:  2:42 PM    Original-Treating Physician or Chiropractor    Page 2-Insurer/TPA    Page 3-Employer    Page 4-Employee       unk

## 2024-09-30 ENCOUNTER — APPOINTMENT (OUTPATIENT)
Dept: MEDICAL GROUP | Facility: MEDICAL CENTER | Age: 39
End: 2024-09-30
Payer: COMMERCIAL

## 2024-09-30 VITALS
HEIGHT: 63 IN | TEMPERATURE: 97.5 F | DIASTOLIC BLOOD PRESSURE: 72 MMHG | SYSTOLIC BLOOD PRESSURE: 122 MMHG | HEART RATE: 86 BPM | RESPIRATION RATE: 16 BRPM | BODY MASS INDEX: 29.95 KG/M2 | WEIGHT: 169 LBS | OXYGEN SATURATION: 99 %

## 2024-09-30 DIAGNOSIS — R42 POSTURAL DIZZINESS: ICD-10-CM

## 2024-09-30 DIAGNOSIS — E66.3 OVERWEIGHT (BMI 25.0-29.9): ICD-10-CM

## 2024-09-30 DIAGNOSIS — L98.7 EXCESS SKIN OF ABDOMEN: ICD-10-CM

## 2024-09-30 DIAGNOSIS — E55.9 VITAMIN D DEFICIENCY: ICD-10-CM

## 2024-09-30 DIAGNOSIS — T14.8XXA BRUISING: ICD-10-CM

## 2024-09-30 DIAGNOSIS — Z23 NEED FOR VACCINATION: ICD-10-CM

## 2024-09-30 ASSESSMENT — FIBROSIS 4 INDEX: FIB4 SCORE: .4535573676110726727

## 2024-09-30 NOTE — PROGRESS NOTES
Subjective:     Naomi Peters is a 39 y.o. female presents to discuss:   Chief Complaint   Patient presents with    Dizziness    Referral Needed     Nutritionist      Verbal consent was acquired by the patient to use [employer] ambient listening note generation during this visit Yes     History of Present Illness  The patient presents for follow-up, review blood work.    Vitamin D suboptimal.    Last office visit patient was concerned regarding bruising.  Iron studies normal.    She is interested in further discussing possible consultation with plastic surgery for the excess abdominal skin that folds down around her pannus.  Since having weight loss loose skin is now present and is currently using an over-the-counter product daily to help manage the skin irritation that forms due to friction.  She also is attributes some of her lower back pain to the excess abdominal skin.      She is interested in consulting a nutritionist as she is is keen to learn more about healthy eating habits.    She has been experiencing dizziness for the past three months, specifically  when changing positions such as standing up from a seated or lying position. The frequency of these episodes has decreased recently.  Denies any double vision, chest pain, presyncope/syncope, headache or focal weakness.  Denies any difficulties with ambulation.    ROS: : see above      Current Outpatient Medications:     metFORMIN ER (GLUCOPHAGE XR) 500 MG TABLET SR 24 HR, Take 1 Tablet by mouth 2 times a day., Disp: 180 Tablet, Rfl: 3    Semaglutide, 2 MG/DOSE, (OZEMPIC, 2 MG/DOSE,) 8 MG/3ML Solution Pen-injector, DIAL AND INJECT UNDER THE SKIN 2 MG EVERY 7 DAYS, Disp: 3 mL, Rfl: 6    Ciclopirox 1 % Shampoo, Use daily for 1-2 weeks until resolved, then can use 2-3 times per week as needed, Disp: 120 mL, Rfl: 3    tretinoin (RETIN-A) 0.025 % cream, AAA at bedtime, pea sized amount after moisturizer, start 2-3 times per week, increase as tolerated,  "Disp: 45 g, Rfl: 1    venlafaxine ER (EFFEXOR) 225 MG TABLET SR 24 HR tablet, Take 225 mg by mouth every day., Disp: , Rfl:     busPIRone (BUSPAR) 30 MG tablet, 30 mg 2 times a day., Disp: , Rfl:     LORazepam (ATIVAN) 1 MG Tab, Take 1 mg by mouth every 6 hours as needed., Disp: , Rfl:     Allergies   Allergen Reactions    Cat Hair Extract      Other reaction(s): Not available    Cat Pelt Standardized Allergenic Extract       Objective:     Vitals: /72   Pulse 86   Temp 36.4 °C (97.5 °F)   Resp 16   Ht 1.6 m (5' 3\")   Wt 76.7 kg (169 lb)   SpO2 99%   BMI 29.94 kg/m²    General: Alert, pleasant, NAD  HEENT: Normocephalic.  Neck supple.   Respiratory: no distress, no audible wheezing, RR -WNL  Skin: Warm, dry, no rashes. Excess abdominal skin  Extremities: No leg edema. No discoloration  Neurological: No tremors  Psych:  Affect/mood is normal, judgement is good, memory is intact, grooming is appropriate.    Assessment/Plan:     Assessment & Plan    1. Vitamin D deficiency  Recommend vitamin D3 2000 to 4000 IU daily.    2. Excess skin of abdomen  She is experiencing irritation under the excess skin, which appears to be contributing to her lower back pain due to the pulling effect. A referral was made for a consultation with a plastic surgeon regarding the excess abdominal skin- Referral to Plastic Surgery    3. Postural dizziness  Acute. Stable.  Blood pressure stable in the clinic.  No red flags at this time however we did review.  Recommend adequate hydration, avoiding abrupt large postural changes.  Follow-up if symptoms persist or worsen.    4. Overweight (BMI 25.0-29.9)  Patient would like to consult with nutritionist to optimize her health.  - Referral to Nutrition Services    5. Bruising  Iron studies WNL    6. Need for vaccination  - INFLUENZA VACCINE TRI INJ (PF)       Return for Annual Preventative Exam.    {I have placed the above orders and discussed them with an approved delegating provider. " The MA is performing the below orders under the direction of Dr. Jonh QUEEN

## 2024-10-01 ENCOUNTER — APPOINTMENT (OUTPATIENT)
Dept: MEDICAL GROUP | Facility: MEDICAL CENTER | Age: 39
End: 2024-10-01
Payer: COMMERCIAL

## 2024-10-10 ENCOUNTER — TELEMEDICINE (OUTPATIENT)
Dept: PHYSICAL MEDICINE AND REHAB | Facility: MEDICAL CENTER | Age: 39
End: 2024-10-10
Payer: COMMERCIAL

## 2024-10-10 DIAGNOSIS — G89.29 CHRONIC LOW BACK PAIN, UNSPECIFIED BACK PAIN LATERALITY, UNSPECIFIED WHETHER SCIATICA PRESENT: ICD-10-CM

## 2024-10-10 DIAGNOSIS — M25.562 CHRONIC PAIN OF LEFT KNEE: ICD-10-CM

## 2024-10-10 DIAGNOSIS — M79.10 MYALGIA: ICD-10-CM

## 2024-10-10 DIAGNOSIS — G89.29 CHRONIC PAIN OF LEFT KNEE: ICD-10-CM

## 2024-10-10 DIAGNOSIS — Z71.82 EXERCISE COUNSELING: ICD-10-CM

## 2024-10-10 DIAGNOSIS — E66.9 OBESITY (BMI 30-39.9): ICD-10-CM

## 2024-10-10 DIAGNOSIS — M54.50 CHRONIC LOW BACK PAIN, UNSPECIFIED BACK PAIN LATERALITY, UNSPECIFIED WHETHER SCIATICA PRESENT: ICD-10-CM

## 2024-10-10 PROCEDURE — G2211 COMPLEX E/M VISIT ADD ON: HCPCS | Mod: 95 | Performed by: GENERAL PRACTICE

## 2024-10-10 PROCEDURE — 99214 OFFICE O/P EST MOD 30 MIN: CPT | Mod: 95 | Performed by: GENERAL PRACTICE

## 2024-10-10 ASSESSMENT — PATIENT HEALTH QUESTIONNAIRE - PHQ9: CLINICAL INTERPRETATION OF PHQ2 SCORE: 0

## 2024-10-20 ENCOUNTER — HOSPITAL ENCOUNTER (EMERGENCY)
Facility: MEDICAL CENTER | Age: 39
End: 2024-10-20
Attending: EMERGENCY MEDICINE
Payer: COMMERCIAL

## 2024-10-20 VITALS
SYSTOLIC BLOOD PRESSURE: 138 MMHG | HEART RATE: 87 BPM | HEIGHT: 63 IN | WEIGHT: 169.53 LBS | OXYGEN SATURATION: 96 % | RESPIRATION RATE: 14 BRPM | DIASTOLIC BLOOD PRESSURE: 85 MMHG | BODY MASS INDEX: 30.04 KG/M2 | TEMPERATURE: 98.8 F

## 2024-10-20 DIAGNOSIS — S39.012A LUMBOSACRAL STRAIN, INITIAL ENCOUNTER: ICD-10-CM

## 2024-10-20 PROCEDURE — A9270 NON-COVERED ITEM OR SERVICE: HCPCS | Performed by: EMERGENCY MEDICINE

## 2024-10-20 PROCEDURE — 99283 EMERGENCY DEPT VISIT LOW MDM: CPT

## 2024-10-20 PROCEDURE — 700111 HCHG RX REV CODE 636 W/ 250 OVERRIDE (IP): Performed by: EMERGENCY MEDICINE

## 2024-10-20 PROCEDURE — 96372 THER/PROPH/DIAG INJ SC/IM: CPT

## 2024-10-20 PROCEDURE — 700102 HCHG RX REV CODE 250 W/ 637 OVERRIDE(OP): Performed by: EMERGENCY MEDICINE

## 2024-10-20 RX ORDER — KETOROLAC TROMETHAMINE 15 MG/ML
15 INJECTION, SOLUTION INTRAMUSCULAR; INTRAVENOUS ONCE
Status: COMPLETED | OUTPATIENT
Start: 2024-10-20 | End: 2024-10-20

## 2024-10-20 RX ORDER — CYCLOBENZAPRINE HCL 10 MG
10 TABLET ORAL ONCE
Status: COMPLETED | OUTPATIENT
Start: 2024-10-20 | End: 2024-10-20

## 2024-10-20 RX ORDER — OXYCODONE HYDROCHLORIDE 5 MG/1
5 TABLET ORAL EVERY 6 HOURS PRN
Qty: 12 TABLET | Refills: 0 | Status: SHIPPED | OUTPATIENT
Start: 2024-10-20 | End: 2024-10-23

## 2024-10-20 RX ORDER — HYDROMORPHONE HYDROCHLORIDE 1 MG/ML
1 INJECTION, SOLUTION INTRAMUSCULAR; INTRAVENOUS; SUBCUTANEOUS ONCE
Status: COMPLETED | OUTPATIENT
Start: 2024-10-20 | End: 2024-10-20

## 2024-10-20 RX ADMIN — CYCLOBENZAPRINE 10 MG: 10 TABLET, FILM COATED ORAL at 14:18

## 2024-10-20 RX ADMIN — HYDROMORPHONE HYDROCHLORIDE 1 MG: 1 INJECTION, SOLUTION INTRAMUSCULAR; INTRAVENOUS; SUBCUTANEOUS at 14:18

## 2024-10-20 RX ADMIN — KETOROLAC TROMETHAMINE 15 MG: 15 INJECTION, SOLUTION INTRAMUSCULAR; INTRAVENOUS at 14:18

## 2024-10-20 ASSESSMENT — FIBROSIS 4 INDEX: FIB4 SCORE: .4535573676110726727

## 2024-10-29 ENCOUNTER — APPOINTMENT (OUTPATIENT)
Dept: PHYSICAL MEDICINE AND REHAB | Facility: MEDICAL CENTER | Age: 39
End: 2024-10-29
Payer: COMMERCIAL

## 2024-11-05 DIAGNOSIS — F81.9 LEARNING DIFFICULTY: ICD-10-CM

## 2024-11-05 DIAGNOSIS — R41.840 CONCENTRATION DEFICIT: ICD-10-CM

## 2024-11-12 ENCOUNTER — OFFICE VISIT (OUTPATIENT)
Dept: PHYSICAL MEDICINE AND REHAB | Facility: MEDICAL CENTER | Age: 39
End: 2024-11-12
Payer: COMMERCIAL

## 2024-11-12 VITALS
WEIGHT: 169.09 LBS | DIASTOLIC BLOOD PRESSURE: 69 MMHG | HEART RATE: 86 BPM | SYSTOLIC BLOOD PRESSURE: 102 MMHG | HEIGHT: 63 IN | OXYGEN SATURATION: 100 % | TEMPERATURE: 97.9 F | BODY MASS INDEX: 29.96 KG/M2

## 2024-11-12 DIAGNOSIS — Z71.82 EXERCISE COUNSELING: ICD-10-CM

## 2024-11-12 DIAGNOSIS — M79.10 MYALGIA: ICD-10-CM

## 2024-11-12 DIAGNOSIS — Z13.31 POSITIVE DEPRESSION SCREENING: ICD-10-CM

## 2024-11-12 PROCEDURE — 20553 NJX 1/MLT TRIGGER POINTS 3/>: CPT | Performed by: GENERAL PRACTICE

## 2024-11-12 PROCEDURE — 3074F SYST BP LT 130 MM HG: CPT | Performed by: GENERAL PRACTICE

## 2024-11-12 PROCEDURE — 76942 ECHO GUIDE FOR BIOPSY: CPT | Performed by: GENERAL PRACTICE

## 2024-11-12 PROCEDURE — 3078F DIAST BP <80 MM HG: CPT | Performed by: GENERAL PRACTICE

## 2024-11-12 PROCEDURE — 99213 OFFICE O/P EST LOW 20 MIN: CPT | Mod: 25 | Performed by: GENERAL PRACTICE

## 2024-11-12 PROCEDURE — 1125F AMNT PAIN NOTED PAIN PRSNT: CPT | Performed by: GENERAL PRACTICE

## 2024-11-12 RX ORDER — BUPIVACAINE HYDROCHLORIDE 5 MG/ML
3 INJECTION, SOLUTION EPIDURAL; INTRACAUDAL ONCE
Status: COMPLETED | OUTPATIENT
Start: 2024-11-12 | End: 2024-11-12

## 2024-11-12 RX ADMIN — Medication 3 ML: at 17:33

## 2024-11-12 RX ADMIN — BUPIVACAINE HYDROCHLORIDE 3 ML: 5 INJECTION, SOLUTION EPIDURAL; INTRACAUDAL at 17:33

## 2024-11-12 ASSESSMENT — PATIENT HEALTH QUESTIONNAIRE - PHQ9
5. POOR APPETITE OR OVEREATING: 0 - NOT AT ALL
CLINICAL INTERPRETATION OF PHQ2 SCORE: 2
SUM OF ALL RESPONSES TO PHQ QUESTIONS 1-9: 11

## 2024-11-12 ASSESSMENT — FIBROSIS 4 INDEX: FIB4 SCORE: .4535573676110726727

## 2024-11-12 ASSESSMENT — PAIN SCALES - GENERAL: PAINLEVEL_OUTOF10: 3=SLIGHT PAIN

## 2024-11-13 NOTE — PROCEDURES
TRIGGER POINT INJECTION     Date of Service: 11/12/2024     Physician/s: Sylvester Laureano D.O.    Pre-operative Diagnosis: Myalgia (M79.1)    Post-operative Diagnosis: Myalgia (M79.1)    Procedure: Left lumbar paraspinal muscles at T12, L2, L4, L5 and left lower latissimus dorsi trigger point injections      The risks, benefits, and alternatives of the procedure were reviewed and discussed with the patient.  Written informed consent was freely obtained. A pre-procedural time-out was conducted by the physician verifying patient’s identity, procedure to be performed, procedure site and side, and allergy verification. Appropriate equipment was determined to be in place for the procedure.     The site as well as the side was identified  and the patient was counseled on the procedure along with the side effects, which are not limited to increased pain, hematoma, atrophy of tissues, infection, nerve damage, CRPS (RSD), skin dimpling, loss of skin/subcutaneous tissue/fat tissue, paraplegia, tetraplegia, septicemia, and other known and unknown effects which might be harmful/fatal.  The patient understood the same and verbalized agreement. It was carried out as below:     Solution used:   -total 14ml mixed; 7ml 1% lidocaine, 7ml 0.25% bupivacaine     Total solution used:  14    Injected: Myofascial trigger points at the above muscle(s) was/(were) inactivated via needling procedure under aseptic precautions.      In the office suite exam room the patient was placed in a prone position and the skin areas for injection over the above muscle(s) was/were marked. A solution was prepared as above. . A 27g needle was placed into each of the markings at the areas above under ultrasound guidance with an in plane approach.. After negative aspiration, approximately a 1.5 mL of the above solution was injected. The needle was removed intact after each trigger point injection, and the patient's back was covered with a 4x4 gauze, gentle soft  tissue massage, and a dressing was applied. There were no complications noted.  Ultrasound imaging saved and uploaded.    Postprocedure care explained to the patient regarding icing as well as stretching activity.      Instructed patient to avoid submerging in water for at least 24 to 48 hours.    Instructed patient to monitor injection site for signs of infection.    Preprocedural pain: 7/10  Postprocedural pain: 0-1/10    Trigger point injection, 3 or more muscles 13319  Ultrasonic guidance for needle placement: [id number]      Sylvester Laureano DO  Physical Medicine and Rehabilitation  [facility]

## 2024-11-13 NOTE — PROGRESS NOTES
Physiatry (Physical Medicine and  Rehabilitation)       Patient Name: Naomi Peters   Patient : 1985  PCP: BERNICE Díaz  MRN: [id number]     Date of service: See epic    Chief complaint:   Chief Complaint   Patient presents with    Follow-Up     TPI injections        Referring provider: Christiane Dickerson*        HISTORY    Naomi Peters is a 38 y.o. pleasant female  who presents with a follow up.       Medical records review:  I reviewed the note from the referring provider Christiane Dickerson* including the note dated 24.  10/20/24 ed: lbp,  toradol, tizanidine, roxicodone    Prior Procedures:   Plasma disc compression [facility] 2009  Prior injections in [place].   21 trigger point injections by Dr Romeo Rodriguez w/CS  24 LEFT greater trochanteric bursa injection ultrasound-guided - effective  24 RIGHT right latissimus dorsi/lumbar paraspinal trigger point injections w/CS    HPI:    Pain along left thoracolumbar paraspinal muscles.  No referred or radiating pain.  Pain level 7/10.  Pain controlled with recent ED visit.  Lower back pain bilaterally and switching both sides.  Visited chiropractor ([facility] with Abdirashid Osborne)  helped her with traction and decompression.    Still having relief from left greater trochanteric bursa injection.    ROS:   Fever, Chills, Sweats: Denies  Involuntary Weight Loss: Denies  Endorses depression and anxiety.  See HPI.   All other systems reviewed and negative.     Psychological testing for pain as depression and pain commonly coexist and need to both be treated.     Opioid Risk Score: 1      Interpretation of Opioid Risk Score   Score 0-3 = Low risk of abuse. Do UDS at least once per year.  Score 4-7 = Moderate risk of abuse. Do UDS 1-4 times per year.  Score 8+ = High risk of abuse. Refer to specialist.    PHQ      2024     4:40 PM 10/10/2024    11:40 AM 2024     4:20 PM    Depression Screen (PHQ-2/PHQ-9)   PHQ-2 Total Score 2 0 2   PHQ-9 Total Score 12  11       Interpretation of PHQ-9 Total Score   Score Severity   1-4 No Depression   5-9 Mild Depression   10-14 Moderate Depression   15-19 Moderately Severe Depression   20-27 Severe Depression      OCCUPATIONAL history: preK teacher      GOALS OF TREATMENT: Symptom/Pain relief. improve function.        PMHx:   Past Medical History:   Diagnosis Date    Anxiety     Depression     Eczema 02/07/2018    Migraine     PCOS (polycystic ovarian syndrome)        PSHx:   Past Surgical History:   Procedure Laterality Date    LA LARYNGOSCOPY,DIRECT,DIAGNOSTIC Bilateral 04/25/2024    Procedure: SEPTOPLASTY, LARYNGOSCOPY, DIRECT Without biopsy, BILATERAL EXCISION INFERIOR TURBINATE PARTIAL;  Surgeon: John Mercer M.D.;  Location: SURGERY SAME DAY [facility];  Service: Ent    LA EXCISION TURBINATE Bilateral 04/25/2024    Procedure: EXCISION, NASAL TURBINATE;  Surgeon: John Mercer M.D.;  Location: SURGERY SAME DAY [facility];  Service: Ent    OTHER      back surgery       Family history   Family History   Problem Relation Age of Onset    Diabetes Mother     Anxiety disorder Mother     Depression Mother     Ovarian Cancer Mother     Rheumatologic Disease Mother         Lupus    Alcohol abuse Father     Alcohol abuse Brother     Schizophrenia Brother     Anxiety disorder Brother     Depression Brother     Colon Cancer Maternal Aunt     Breast Cancer Paternal Aunt     Breast Cancer Paternal Aunt     Heart Disease Maternal Grandmother     Pancreatic Cancer Paternal Grandmother        Medications: reviewed on epic.   Outpatient Medications Marked as Taking for the 11/12/24 encounter (Office Visit) with Sylvester Laureano D.O.   Medication Sig Dispense Refill    tizanidine (ZANAFLEX) 4 MG Tab Take 1 Tablet by mouth every 6 hours as needed (low back pain). 30 Tablet 3    metFORMIN ER (GLUCOPHAGE XR) 500 MG TABLET SR 24 HR Take 1 Tablet by mouth 2 times a  day. 180 Tablet 3    Semaglutide, 2 MG/DOSE, (OZEMPIC, 2 MG/DOSE,) 8 MG/3ML Solution Pen-injector DIAL AND INJECT UNDER THE SKIN 2 MG EVERY 7 DAYS 3 mL 6    Ciclopirox 1 % Shampoo Use daily for 1-2 weeks until resolved, then can use 2-3 times per week as needed 120 mL 3    tretinoin (RETIN-A) 0.025 % cream AAA at bedtime, pea sized amount after moisturizer, start 2-3 times per week, increase as tolerated 45 g 1    venlafaxine ER (EFFEXOR) 225 MG TABLET SR 24 HR tablet Take 225 mg by mouth every day.      busPIRone (BUSPAR) 30 MG tablet 30 mg 2 times a day.      LORazepam (ATIVAN) 1 MG Tab Take 1 mg by mouth every 6 hours as needed.          Allergies:   Allergies   Allergen Reactions    Cat Hair Extract      Other reaction(s): Not available    Cat Pelt Standardized Allergenic Extract       Social Hx:   Social History     Socioeconomic History    Marital status: Single     Spouse name: Not on file    Number of children: Not on file    Years of education: Not on file    Highest education level: Associate degree: academic program   Occupational History    Not on file   Tobacco Use    Smoking status: Never    Smokeless tobacco: Never   Vaping Use    Vaping status: Never Used   Substance and Sexual Activity    Alcohol use: Yes     Alcohol/week: 0.6 oz     Types: 1 Shots of liquor per week    Drug use: No    Sexual activity: Not Currently     Partners: Male     Birth control/protection: Condom, Implant   Other Topics Concern    Not on file   Social History Narrative    Not on file     Social Drivers of Health     Financial Resource Strain: Patient Declined (3/14/2024)    Overall Financial Resource Strain (CARDIA)     Difficulty of Paying Living Expenses: Patient declined   Food Insecurity: Unknown (3/14/2024)    Hunger Vital Sign     Worried About Running Out of Food in the Last Year: Never true     Ran Out of Food in the Last Year: Patient declined   Transportation Needs: No Transportation Needs (3/14/2024)    TORO Cruz  "Transportation     Lack of Transportation (Medical): No     Lack of Transportation (Non-Medical): No   Physical Activity: Insufficiently Active (3/14/2024)    Exercise Vital Sign     Days of Exercise per Week: 2 days     Minutes of Exercise per Session: 30 min   Stress: Stress Concern Present (3/14/2024)    Citizen of [place] of Occupational Health - Occupational Stress Questionnaire     Feeling of Stress : To some extent   Social Connections: Moderately Isolated (3/14/2024)    Social Connection and Isolation Panel [NHANES]     Frequency of Communication with Friends and Family: Three times a week     Frequency of Social Gatherings with Friends and Family: Twice a week     Attends [employer] Services: More than 4 times per year     Active Member of Clubs or Organizations: No     Attends Club or Organization Meetings: Patient declined     Marital Status:    Intimate Partner Violence: Not on file   Housing Stability: High Risk (3/14/2024)    Housing Stability Vital Sign     Unable to Pay for Housing in the Last Year: Yes     Number of Places Lived in the Last Year: 1     Unstable Housing in the Last Year: No         EXAMINATION   Vitals: /69 (BP Location: Right arm, Patient Position: Sitting, BP Cuff Size: Adult)   Pulse 86   Temp 36.6 °C (97.9 °F) (Temporal)   Ht 1.6 m (5' 3\")   Wt 76.7 kg (169 lb 1.5 oz)   SpO2 100%   Physical Exam:     Body Habitus: Body mass index is 29.95 kg/m².  Appearance: Well-groomed, well-nourished, not disheveled  Eyes: No scleral icterus to suggest severe liver disease, no proptosis to suggest severe hyperthyroid  ENT -no obvious auditory deficits, no external lesions, moist mucus membranes   Skin -no rashes or lesions noted. No appreciable skin breakdown on exposed skin areas.    Respiratory-  breathing comfortably on room air, no audible wheezing, full sentences  Cardiovascular- No lower extremity edema noted.   Psychiatric- alert and oriented, calm, comfortable, " "cooperative     Musculoskeletal and Neuro:  Gait and station - normal gait with reciprocal pattern,  no presence/use of ambulatory device, no arm assistance with sit-to-stand, nonantalgic. no loss of balance during exam.  No change in patient's demeanor with exam.    Grossly normal cranial nerve exam  Coordination grossly intact   Reproducible tenderness to palpation left T12, L2, L4, L5 locations       MEDICAL DECISION MAKING    Medical records review: see under HPI section.     DATA    Labs:   Lab Results   Component Value Date/Time    SODIUM 138 03/04/2024 07:52 AM    POTASSIUM 3.9 03/04/2024 07:52 AM    CHLORIDE 105 03/04/2024 07:52 AM    CO2 23 03/04/2024 07:52 AM    ANION 10.0 03/04/2024 07:52 AM    GLUCOSE 100 (H) 03/04/2024 07:52 AM    BUN 7 (L) 03/04/2024 07:52 AM    CREATININE 0.60 03/04/2024 07:52 AM    CALCIUM 9.1 03/04/2024 07:52 AM    ASTSGOT 21 03/04/2024 07:52 AM    ALTSGPT 28 03/04/2024 07:52 AM    TBILIRUBIN 0.6 03/04/2024 07:52 AM    ALBUMIN 4.0 03/04/2024 07:52 AM    TOTPROTEIN 7.0 03/04/2024 07:52 AM    GLOBULIN 3.0 03/04/2024 07:52 AM    AGRATIO 1.3 03/04/2024 07:52 AM   ]    No results found for: \"PROTHROMBTM\", \"INR\"     Lab Results   Component Value Date/Time    WBC 5.4 06/08/2024 09:26 AM    RBC 5.04 06/08/2024 09:26 AM    HEMOGLOBIN 15.3 06/08/2024 09:26 AM    HEMATOCRIT 45.1 06/08/2024 09:26 AM    MCV 89.5 06/08/2024 09:26 AM    MCH 30.4 06/08/2024 09:26 AM    MCHC 33.9 06/08/2024 09:26 AM    MPV 9.7 06/08/2024 09:26 AM    NEUTSPOLYS 55.50 05/14/2019 07:46 AM    LYMPHOCYTES 34.40 05/14/2019 07:46 AM    MONOCYTES 7.20 05/14/2019 07:46 AM    EOSINOPHILS 2.10 05/14/2019 07:46 AM    BASOPHILS 0.60 05/14/2019 07:46 AM        Lab Results   Component Value Date/Time    HBA1C 5.4 04/27/2023 07:38 AM        Imaging:   I personally reviewed following images, these are my reads  Lumbar x-ray 5/21/2024: Mild L4-5 and L5-S1 facet joint arthrosis.  Degenerative changes  Left knee x-ray 10/20/2023: No " acute bony processes: Very mild degenerative changes    MRI L-Spine on 24: mild degenerative changes and facet arthropathy. No areas of high-grade central canal or neuroforaminal stenosis.       24 thoracic xr: mild degen changes  24 hip xr: no acute osseous abnormalities    IMAGING radiology reads. I reviewed the following radiology reads                               Results for orders placed during the hospital encounter of 23    DX-KNEE 3 VIEWS LEFT    Impression  No evidence of acute fracture or dislocation.    Results for orders placed during the hospital encounter of 23    DX-KNEE COMPLETE 4+ LEFT    Impression  Normal left knee radiography.   Results for orders placed during the hospital encounter of 24    DX-LUMBAR SPINE-2 OR 3 VIEWS    Impression  1.  Transitional thoracolumbar vertebra considered T12 this dictation.    2.  Mild/moderate L4-5 degenerative disc disease and mild L4-5 and L5-S1 facet joint arthrosis.                           ASSESSMENT AND PLAN:  Naomi Peters   : 1985   Past medical history of prediabetes, panic attacks, migraines, major depressive disorder, general anxiety disorder, chronic low back pain, BMI 31-39    1. Myalgia  Consent for all Surgical, Special Diagnostic or Therapeutic Procedures    lidocaine (Xylocaine) 1 % injection 3 mL    bupivacaine (pf) (Marcaine/Sensorcaine) 0.5 % injection 3 mL      2. BMI 29.0-29.9,adult        3. Exercise counseling        4. Positive depression screening            Myalgia lumbar paraspinal muscles with reproducible tenderness to palpation.  No acute osseous abnormalities on x-rays performed in July.  Will proceed with trigger point injections without corticosteroid    Knee pain: continue with PT for knees  Discussing weight loss measures with PCP  Prior prescription with roxyicodone and provide pain relief  Past medical history of, panic attacks, major depressive disorder: already established  with behavioral health    exercising counseling discussed.  Extensive discussion regarding treatment options, at this time recommending the following:    Orders Placed This Encounter    lidocaine (Xylocaine) 1 % injection 3 mL    bupivacaine (pf) (Marcaine/Sensorcaine) 0.5 % injection 3 mL    Consent for all Surgical, Special Diagnostic or Therapeutic Procedures         PLAN  -Medications/Modalities:    No changes in medications. Continue tizanidine  -Therapy (PT/OT/Aquatherapy): continue to focus on strengthening and stretching    -Home exercise program: encouraged    -Office Injections: will perform with this visit  Patient would like to proceed with injection of left lumbar paraspinal muscles.  The risks, benefits, and alternatives to this procedure were discussed and the patient wishes to proceed with the procedure. Risks include but are not limited to damage to surrounding structures, infection, bleeding, worsening of pain which can be permanent, and weakness which can be permanent. Benefits include pain relief and improved function. Alternatives include not doing the procedure.   Discussed with patient that we will hold off on corticosteroids as this will be a diagnostic and potentially therapeutic treatment. May consider corticosteroids on follow up appointment.    -Diagnostic workup: reviewed today as above   -Referrals: NA    Follow-up: Follow-up in 2 weeks post injections    Patient expressed understanding of the management plan. Patient (and Family Members) was/were encouraged to call if any worries, issues, problems or concerns prior to the next visit     Please note that this dictation was created using voice recognition software. I have made every reasonable attempt to correct obvious errors but there may be errors of grammar and content that I may have overlooked prior to finalization of this note.      Sylvester Laureano, DO  Physical Medicine and Rehabilitation  [facility]         ENE PACHECO  TIARA Dickerson.   CC Christiane Dickerson*

## 2024-11-26 ENCOUNTER — OFFICE VISIT (OUTPATIENT)
Dept: DERMATOLOGY | Facility: IMAGING CENTER | Age: 39
End: 2024-11-26
Payer: COMMERCIAL

## 2024-11-26 DIAGNOSIS — L82.1 SK (SEBORRHEIC KERATOSIS): ICD-10-CM

## 2024-11-26 PROCEDURE — 99212 OFFICE O/P EST SF 10 MIN: CPT | Performed by: NURSE PRACTITIONER

## 2024-11-26 NOTE — PROGRESS NOTES
DERMATOLOGY NOTE  FOLLOW UP VISIT       Chief complaint: Follow-Up and Skin Lesion (Rt axilla)     HPI/location: Rt axilla  Time present: couple months ago  Painful lesion: Yes to the touch  Itching lesion: Yes  Enlarging lesion: Yes  Anything make it better or worse? Shaving, clothing      History of skin cancer: No  History of precancers/actinic keratoses: No  History of biopsies:No  History of blistering/severe sunburns:No  Family history of skin cancer:Yes, Details: aunt -melanoma   Family history of atypical moles:No         Allergies   Allergen Reactions    Cat Hair Extract      Other reaction(s): Not available    Cat Pelt Standardized Allergenic Extract        MEDICATIONS:  Medications relevant to specialty reviewed.     REVIEW OF SYSTEMS:   Positive for skin (see HPI)  Negative for fevers and chills       EXAM:  There were no vitals taken for this visit.  Constitutional: Well-developed, well-nourished, and in no distress.     A focused skin exam was performed including the affected areas of the R axilla. Notable findings on exam today listed below and/or in assessment/plan.     Medium brown waxy papule to R axilla, with benign-appearing pigment network patterns on dermoscopy    IMPRESSION / PLAN:    1. SK (seborrheic keratosis) vs pigmented acrochordon  - Benign-appearing nature of lesions discussed during exam.   - reassurance provided, courtesy LN2 applied  - Advised to continue to monitor for any return to clinic for new or concerning changes.        Discussed risks associated with LN2, Patient verbalized understanding and agrees with plan regarding the above          Please note that this dictation was created using voice recognition software. I have made every reasonable attempt to correct obvious errors, but I expect that there are errors of grammar and possibly content that I did not discover before finalizing the note.      Return to clinic in: Return for PRN. and as needed for any new or changing  skin lesions.

## 2024-12-03 ENCOUNTER — APPOINTMENT (OUTPATIENT)
Dept: PHYSICAL MEDICINE AND REHAB | Facility: MEDICAL CENTER | Age: 39
End: 2024-12-03
Payer: COMMERCIAL

## 2024-12-09 ENCOUNTER — GYNECOLOGY VISIT (OUTPATIENT)
Dept: OBGYN | Facility: CLINIC | Age: 39
End: 2024-12-09
Payer: COMMERCIAL

## 2024-12-09 ENCOUNTER — HOSPITAL ENCOUNTER (OUTPATIENT)
Facility: MEDICAL CENTER | Age: 39
End: 2024-12-09
Attending: STUDENT IN AN ORGANIZED HEALTH CARE EDUCATION/TRAINING PROGRAM
Payer: COMMERCIAL

## 2024-12-09 VITALS
WEIGHT: 165.4 LBS | SYSTOLIC BLOOD PRESSURE: 128 MMHG | HEART RATE: 80 BPM | DIASTOLIC BLOOD PRESSURE: 75 MMHG | BODY MASS INDEX: 29.3 KG/M2 | HEIGHT: 63 IN

## 2024-12-09 DIAGNOSIS — R10.2 PELVIC PAIN: ICD-10-CM

## 2024-12-09 DIAGNOSIS — Z01.419 ENCOUNTER FOR WELL WOMAN EXAM WITH ROUTINE GYNECOLOGICAL EXAM: ICD-10-CM

## 2024-12-09 PROCEDURE — 99396 PREV VISIT EST AGE 40-64: CPT | Performed by: STUDENT IN AN ORGANIZED HEALTH CARE EDUCATION/TRAINING PROGRAM

## 2024-12-09 PROCEDURE — 87480 CANDIDA DNA DIR PROBE: CPT

## 2024-12-09 PROCEDURE — 87624 HPV HI-RISK TYP POOLED RSLT: CPT

## 2024-12-09 PROCEDURE — 99459 PELVIC EXAMINATION: CPT | Performed by: STUDENT IN AN ORGANIZED HEALTH CARE EDUCATION/TRAINING PROGRAM

## 2024-12-09 PROCEDURE — 87491 CHLMYD TRACH DNA AMP PROBE: CPT

## 2024-12-09 PROCEDURE — 87591 N.GONORRHOEAE DNA AMP PROB: CPT

## 2024-12-09 PROCEDURE — 88142 CYTOPATH C/V THIN LAYER: CPT

## 2024-12-09 PROCEDURE — 87510 GARDNER VAG DNA DIR PROBE: CPT

## 2024-12-09 PROCEDURE — 87660 TRICHOMONAS VAGIN DIR PROBE: CPT

## 2024-12-09 ASSESSMENT — FIBROSIS 4 INDEX: FIB4 SCORE: .4535573676110726727

## 2024-12-09 NOTE — PROGRESS NOTES
Pt presents for GYN visit  Last seen on: 12/18/23 Jai   Phone: [phone]   Pharmacy verified   LMP: 12/8/24  Sexually active: not currently   BCM: Nexplanon (inserted on 12/8/23)  Last PAP: 11/7/2023 +ASCUS, HPV negative   MAMMO: 12/11/23 normal, lump in breast but unchanged. Has next mammo scheduled for Thursday   Pt states she does want STD testing.   Had Colpo on 12/18/23 (came back normal and to do co test in 1 year)

## 2024-12-09 NOTE — PROGRESS NOTES
ANNUAL GYNECOLOGY VISIT    Chief Complaint  Annual Exam    Subjective  Naomi Peters is a 39 y.o. female  Patient's last menstrual period was 2024 (exact date). on Nexplanon (inserted 23) for contraception who presents today for Annual Exam.     Patient reports of intermittent thick, non-odorous vaginal discharge when wiping, denies vulvar itching. However, she states she has random, sharp, stabbing, deep pain in uterus. No known triggers and not related to menses. Resolves with taking deep breaths/rest. Patient is concerned as she does have strong family history of ovarian cancer.    For contraception, she has Nexplanon and is doing well with it. No complaints.      Preventive Care   Immunization History   Administered Date(s) Administered    COVID-19, mRNA, LNP-S, PF, eriberto-sucrose, 30 mcg/0.3 mL 2024    Covid-19 Mrna (Spikevax) Moderna 12+ Years 10/05/2023    Hepatitis A Vaccine, Adult 2023    Hepatitis A Vaccine, Ped/Adol 04/15/2002, 2008    Hepatitis B Vaccine (Adol/Adult) 2023    Hepatitis B Vaccine Adolescent/Pediatric 10/01/1998, 1998, 2006    Influenza Vaccine Quad Inj (Pf) 2017, 2020, 2022    Influenza Vaccine Quad Recombinant 10/05/2023    Influenza split virus trivalent (PF) 2024    MMR Vaccine 1992, 10/01/1998    PFIZER PURPLE CAP SARS-COV-2 VACCINATION (12+) 2021, 2021, 2022    Tdap Vaccine 2009, 2023     Mammogram: Patient scheduled for screening MMG this 24. Diagnostic MMG and US done 23 for palpable lumps in right breast, benign findings on MMG and US. No mass, cysts, fluid collection or abnormal shadowing identified.   Last Colonoscopy: Not indicated  H/o osteoporosis or osteopenia: no  HPV vaccine: did not have  Pap History:  -23: Colposcopy/ECC/Cervical Biopsy: Benign, recommendation to f/u in 1 year with PAP and HPV cotesting.  -22: ASCUS,  positive HR-HPV. Neg HPV 16//18.  -19: NILM, neg HPV.  History of abnormal pap: yes, see above  Intimate partner violence (patient interviewed when partner was not in the room): Negative      Gynecology History  Current Sexual Activity: yes - 1  Partners: Male  History of sexually transmitted diseases? yes - Chlamydia earlier this year and treated for it  Abnormal vaginal discharge? yes - intermittent (see HPI)  Significant pelvic pain: No  Urinary incontinence: no    Menstrual History  Patient's last menstrual period was 2024 (exact date).  Ever since having Nexplanon, periods are irregular/infrequent, lasting 3 days. Menses light, spotting when wiping with mild cramping. No intermenstrual bleeding, spotting, or discharge.      Cancer Risk Assessement:  Family history of:   - Breast cancer: 3 paternal aunts, unknown age of diagnosis but before 51yo   - Ovarian cancer: Mother. Maternal aunt   - Uterine cancer: no   - Colon cancer: Maternal aunt     Obstetric History  OB History    Para Term  AB Living   0 0 0 0 0 0   SAB IAB Ectopic Molar Multiple Live Births   0 0 0 0 0 0       Past Medical History  Past Medical History:   Diagnosis Date    Anxiety     Depression     Eczema 2018    Migraine     PCOS (polycystic ovarian syndrome)        Past Surgical History  Past Surgical History:   Procedure Laterality Date    MT LARYNGOSCOPY,DIRECT,DIAGNOSTIC Bilateral 2024    Procedure: SEPTOPLASTY, LARYNGOSCOPY, DIRECT Without biopsy, BILATERAL EXCISION INFERIOR TURBINATE PARTIAL;  Surgeon: John Mercer M.D.;  Location: SURGERY SAME DAY [facility] ER;  Service: Ent    MT EXCISION TURBINATE Bilateral 2024    Procedure: EXCISION, NASAL TURBINATE;  Surgeon: John Mercer M.D.;  Location: SURGERY SAME DAY [facility] ER;  Service: Ent    OTHER      back surgery       Social History  Social History     Tobacco Use    Smoking status: Never    Smokeless tobacco: Never   Vaping Use    Vaping status:  Never Used   Substance Use Topics    Alcohol use: Yes     Alcohol/week: 0.6 oz     Types: 1 Shots of liquor per week     Comment: rare    Drug use: Never        Family History  Family History   Problem Relation Age of Onset    Diabetes Mother     Anxiety disorder Mother     Depression Mother     Ovarian Cancer Mother     Rheumatologic Disease Mother         Lupus    Alcohol abuse Father     Alcohol abuse Brother     Schizophrenia Brother     Anxiety disorder Brother     Depression Brother     Colon Cancer Maternal Aunt     Breast Cancer Paternal Aunt     Breast Cancer Paternal Aunt     Heart Disease Maternal Grandmother     Pancreatic Cancer Paternal Grandmother        Home Medications  Current Outpatient Medications   Medication Sig    tizanidine (ZANAFLEX) 4 MG Tab Take 1 Tablet by mouth every 6 hours as needed (low back pain).    metFORMIN ER (GLUCOPHAGE XR) 500 MG TABLET SR 24 HR Take 1 Tablet by mouth 2 times a day.    Semaglutide, 2 MG/DOSE, (OZEMPIC, 2 MG/DOSE,) 8 MG/3ML Solution Pen-injector DIAL AND INJECT UNDER THE SKIN 2 MG EVERY 7 DAYS    Ciclopirox 1 % Shampoo Use daily for 1-2 weeks until resolved, then can use 2-3 times per week as needed    tretinoin (RETIN-A) 0.025 % cream AAA at bedtime, pea sized amount after moisturizer, start 2-3 times per week, increase as tolerated    venlafaxine ER (EFFEXOR) 225 MG TABLET SR 24 HR tablet Take 225 mg by mouth every day.    busPIRone (BUSPAR) 30 MG tablet 30 mg 2 times a day.    LORazepam (ATIVAN) 1 MG Tab Take 1 mg by mouth every 6 hours as needed.       Allergies/Reactions  Allergies   Allergen Reactions    Cat Hair Extract      Other reaction(s): Not available    Cat Pelt Standardized Allergenic Extract       ROS  Review of Systems:  Gen: no fevers or chills, no significant weight loss or gain  Respiratory:  no cough or dyspnea  Cardiac:  no chest pain, no palpitations, no syncope  Breast: no breast discharge, pain, lump or skin changes  GI:  no heartburn,  "no abdominal pain, no nausea or vomiting  Psych: no depression or anxiety  Neuro: no migraines with aura, fainting spells, numbness or tingling    Objective  /75 (BP Location: Right arm, Patient Position: Sitting, BP Cuff Size: Adult)   Pulse 80   Ht 5' 3\"   Wt 165 lb 6.4 oz   LMP 12/08/2024 (Exact Date)   BMI 29.30 kg/m²     Constitutional: The patient is well developed and well nourished.  Psychiatric: Patient is oriented to time place and person.   Skin: No rash observed.  Neck: Appears symmetric. Thyroid normal size  Respiratory: normal effort  Breast: Inspection reveals no asymetry or nipple discharge, no skin thickening, dimpling or erythema.  Palpation demonstrates no masses. Mild TTP along upper outer quadrant of left breast at 2 oclock position.   Abdomen: Soft, non-tender.  Pelvic Exam:      Vulva: external female genitalia are normal in appearance. No lesions     Urethra - no lesions, no erythema     Vagina: moist, pink, normal ruggae. Thick, white, non-odorous vaginal discharge noted.     Cervix: pink, smooth, no lesions, no CMT     Uterus - non-tender, normal size, shape, contour, mobile, anteverted     Ovaries: non-tender, no appreciable masses   Pap Smear performed: Yes   Chaperone Present: Lorenzo Anders MA  Extremities: Legs are symmetric and without tenderness. There is no edema present.    Labs/Imaging:  HDL (mg/dL)   Date Value   05/25/2022 46     LDL (mg/dL)   Date Value   05/25/2022 95     No components found for: \"A1C1\"  WBC (K/uL)   Date Value   06/08/2024 5.4     Lab Results   Component Value Date/Time    CO2 23 03/04/2024 0752    BUN 7 (L) 03/04/2024 0752       Patient Active Problem List    Diagnosis Date Noted    Panic attacks 05/21/2024    Nexplanon in place 05/21/2024    ASCUS with positive high risk HPV cervical 11/07/2023    Tear of medial meniscus of knee 08/03/2023    Hyperprolactinemia (HCC) 05/02/2023    Attention deficit hyperactivity disorder (ADHD), inattentive " type, mild 07/01/2021    Prediabetes 11/02/2020    BRCA gene mutation negative 05/22/2019    Family history of systemic lupus erythematosus 04/15/2019    MDD (major depressive disorder), recurrent, in partial remission (HCC) 04/15/2019    Family history of ovarian cancer 04/15/2019    Family history of breast cancer 04/15/2019    Migraine with aura and without status migrainosus, not intractable 04/15/2019    Positive TB test 04/15/2019    SANDI (generalized anxiety disorder) 02/07/2018    PCOS (polycystic ovarian syndrome) 02/07/2018    Chronic low back pain 02/07/2018       Assessment & Plan  Naomi Peters is a 39 y.o. female who presents today for Annual Gyn Exam.     Assessment & Plan  Encounter for well woman exam with routine gynecological exam  - Cervical cancer screening:       Pap: Collected today  - STI Screen (HIV, Syphilis, Chlamydia / Gonorrhea): accepted-chlamydia, gonorrhea, and trichomonas and Vag Path collected based on pt's hx of intermittent vaginal discharge.  - MAMMOGRAM: Scheduled for 12/12/24   - IMMUNIZATIONS:  Recommended Flu and vaccine each season and COVID boosters when indicated.  - DIABETES SCREEN: Follows up with PCP and Endocrinology  - CHOLESTEROL SCREEN: Follows up with PCP and Endocrinology  - COUNSELING: breast self exam, mammography screening, STD prevention, family planning choices, and adequate intake of calcium and vitamin D  Orders:    THINPREP PAP W/HPV AND CTNG; Future    VAGINAL PATHOGENS DNA PANEL; Future    Pelvic pain  - Patient experiencing intermittent, sharp, stabbing, deep pain in uterus. No known triggers and not related to menses. Resolves with taking deep breaths/rest. Patient is concerned as she does have strong family history of ovarian cancer, desires tranvaginal US.   - Discussed different potential causes of pelvic pain including fibroids, cysts, and her hx of PCOS.  Orders:    US-PELVIC COMPLETE (TRANSABDOMINAL/TRANSVAGINAL) (COMBO);  Future      Return: Annually or PRN    Sena Rodriguez P.A.-C.  [facility]   12/9/2024

## 2024-12-10 LAB
C TRACH DNA GENITAL QL NAA+PROBE: NEGATIVE
CANDIDA DNA VAG QL PROBE+SIG AMP: NEGATIVE
G VAGINALIS DNA VAG QL PROBE+SIG AMP: NEGATIVE
N GONORRHOEA DNA GENITAL QL NAA+PROBE: NEGATIVE
SPECIMEN SOURCE: NORMAL
T VAGINALIS DNA VAG QL PROBE+SIG AMP: NEGATIVE

## 2024-12-12 ENCOUNTER — HOSPITAL ENCOUNTER (OUTPATIENT)
Dept: RADIOLOGY | Facility: MEDICAL CENTER | Age: 39
End: 2024-12-12
Attending: NURSE PRACTITIONER
Payer: COMMERCIAL

## 2024-12-12 DIAGNOSIS — Z12.31 VISIT FOR SCREENING MAMMOGRAM: ICD-10-CM

## 2024-12-14 LAB
HPV I/H RISK 1 DNA SPEC QL NAA+PROBE: NOT DETECTED
SPECIMEN SOURCE: NORMAL
THINPREP PAP, CYTOLOGY NL11781: NORMAL

## 2024-12-18 ENCOUNTER — TELEPHONE (OUTPATIENT)
Dept: OBGYN | Facility: CLINIC | Age: 39
End: 2024-12-18
Payer: COMMERCIAL

## 2024-12-18 NOTE — TELEPHONE ENCOUNTER
Please inform patient upon calling back that her pap was normal NILM, neg HPV. Recommend next PAP with HPV co-testing to be done in 1 year (due 12/9/25). Negative for GC/CT/Trich and negative for BV/yeast. Thank you.

## 2024-12-20 DIAGNOSIS — N63.0 MASS OF BREAST, UNSPECIFIED LATERALITY: ICD-10-CM

## 2024-12-31 ENCOUNTER — HOSPITAL ENCOUNTER (EMERGENCY)
Facility: MEDICAL CENTER | Age: 39
End: 2024-12-31
Attending: EMERGENCY MEDICINE
Payer: COMMERCIAL

## 2024-12-31 VITALS
OXYGEN SATURATION: 97 % | RESPIRATION RATE: 18 BRPM | WEIGHT: 162.92 LBS | DIASTOLIC BLOOD PRESSURE: 72 MMHG | HEIGHT: 63 IN | SYSTOLIC BLOOD PRESSURE: 106 MMHG | TEMPERATURE: 97.3 F | HEART RATE: 74 BPM | BODY MASS INDEX: 28.87 KG/M2

## 2024-12-31 DIAGNOSIS — M25.552 PELVIC JOINT PAIN, LEFT: ICD-10-CM

## 2024-12-31 PROCEDURE — 99281 EMR DPT VST MAYX REQ PHY/QHP: CPT

## 2024-12-31 RX ORDER — PREDNISONE 20 MG/1
60 TABLET ORAL DAILY
Qty: 12 TABLET | Refills: 0 | Status: SHIPPED | OUTPATIENT
Start: 2024-12-31 | End: 2025-01-04

## 2024-12-31 ASSESSMENT — FIBROSIS 4 INDEX: FIB4 SCORE: .4535573676110726727

## 2024-12-31 NOTE — ED TRIAGE NOTES
"Patient presents to the ER with the following complaints:    Chief Complaint   Patient presents with    Hip Pain     Left hip pain that is increased. Patient sees a pain specialist for this. No trauma.        /72   Pulse 74   Temp 36.3 °C (97.3 °F) (Temporal)   Resp 18   Ht 1.6 m (5' 3\")   Wt 73.9 kg (162 lb 14.7 oz)   LMP 12/08/2024 (Exact Date)   SpO2 97%   BMI 28.86 kg/m²       "

## 2024-12-31 NOTE — ED PROVIDER NOTES
ED Provider Note    CHIEF COMPLAINT  Chief Complaint   Patient presents with    Hip Pain     Left hip pain that is increased. Patient sees a pain specialist for this. No trauma.        EXTERNAL RECORDS REVIEWED  Other I reviewed a note from the patient's pain  Dr. Laureano from 12 November 2024 the patient had trigger point injections to the left lumbar paraspinal muscles and left lower latissimus dorsi trigger point.  I also reviewed an MRI of the lumbar spine from July 22, 2024 that was negative for significant canal stenosis the patient was noted to have significant degenerative disease.    HPI/ROS      Naomi Peters is a 39 y.o. female who presents with left hip pain.  The patient has chronic pain in the left hip and low back region.  She sees a pain specialist.  She states she took 2 Norco today with no relief.  She has not had any acute trauma.  She had an MRI of her lumbar spine in January and she is also received trigger point injections as mentioned above.  She states the pain is in the left low back and left pelvic region.  She does not have any radicular pain.  She denies paresthesias and states she has no weakness to her lower extremities.    PAST MEDICAL HISTORY   has a past medical history of Anxiety, Depression, Eczema (02/07/2018), Migraine, and PCOS (polycystic ovarian syndrome).    SURGICAL HISTORY   has a past surgical history that includes other; laryngoscopy,direct,diagnostic (Bilateral, 04/25/2024); and excision turbinate (Bilateral, 04/25/2024).    FAMILY HISTORY  Family History   Problem Relation Age of Onset    Diabetes Mother     Anxiety disorder Mother     Depression Mother     Ovarian Cancer Mother     Rheumatologic Disease Mother         Lupus    Alcohol abuse Father     Alcohol abuse Brother     Schizophrenia Brother     Anxiety disorder Brother     Depression Brother     Colon Cancer Maternal Aunt     Breast Cancer Paternal Aunt     Breast Cancer Paternal Aunt  "    Heart Disease Maternal Grandmother     Pancreatic Cancer Paternal Grandmother        SOCIAL HISTORY  Social History     Tobacco Use    Smoking status: Never    Smokeless tobacco: Never   Vaping Use    Vaping status: Never Used   Substance and Sexual Activity    Alcohol use: Yes     Alcohol/week: 0.6 oz     Types: 1 Shots of liquor per week     Comment: rare    Drug use: Never    Sexual activity: Not Currently     Partners: Male     Birth control/protection: Condom, Implant       CURRENT MEDICATIONS  Home Medications       Reviewed by Melvin Cardoso R.N. (Registered Nurse) on 12/31/24 at 1428  Med List Status: Not Addressed     Medication Last Dose Status   busPIRone (BUSPAR) 30 MG tablet  Active   Ciclopirox 1 % Shampoo  Active   LORazepam (ATIVAN) 1 MG Tab  Active   metFORMIN ER (GLUCOPHAGE XR) 500 MG TABLET SR 24 HR  Active   Semaglutide, 2 MG/DOSE, (OZEMPIC, 2 MG/DOSE,) 8 MG/3ML Solution Pen-injector  Active   tizanidine (ZANAFLEX) 4 MG Tab  Active   tretinoin (RETIN-A) 0.025 % cream  Active   venlafaxine ER (EFFEXOR) 225 MG TABLET SR 24 HR tablet  Active                    ALLERGIES  Allergies   Allergen Reactions    Cat Hair Extract      Other reaction(s): Not available    Cat Pelt Standardized Allergenic Extract       PHYSICAL EXAM  VITAL SIGNS: /72   Pulse 74   Temp 36.3 °C (97.3 °F) (Temporal)   Resp 18   Ht 1.6 m (5' 3\")   Wt 73.9 kg (162 lb 14.7 oz)   LMP 12/08/2024 (Exact Date)   SpO2 97%   BMI 28.86 kg/m²    In general the patient does not appear toxic    GI abdomen soft    Back examination the patient does not have any midline tenderness in the cervical, thoracic, nor lumbar spine.  She does have reproducible tenderness in the paraspinal muscles of the lumbar spine on the left.    Skin no erythema nor induration    Neurologic examination the patient has 5 out of 5 motor strength throughout her lower extremities and sensations intact      COURSE & MEDICAL DECISION MAKING    This a " 39-year-old female who presents with left pelvic discomfort and pain in the left lumbar spine.  This is chronic in etiology.  I did review an MRI from July of this year and there is no evidence of acute cord compromise she does have some degenerative change.  She does not have any pain in left hip with internal/external rotation therefore do not suspect this is from an intra-articular process.  The patient's been taking Norco without relief.  I will place the patient on a 5-day course of prednisone and I would like her to follow-up with her pain .  She will return if she is acutely worse.    FINAL DIAGNOSIS  1.  Left pelvic pain  2.  Chronic low back pain    Disposition  The patient will be discharged in stable condition     Electronically signed by: Bishop Johnson M.D., 12/31/2024 3:00 PM

## 2025-01-09 ENCOUNTER — HOSPITAL ENCOUNTER (OUTPATIENT)
Dept: RADIOLOGY | Facility: MEDICAL CENTER | Age: 40
End: 2025-01-09
Attending: NURSE PRACTITIONER
Payer: COMMERCIAL

## 2025-01-09 DIAGNOSIS — N63.0 MASS OF BREAST, UNSPECIFIED LATERALITY: ICD-10-CM

## 2025-01-22 ENCOUNTER — HOSPITAL ENCOUNTER (OUTPATIENT)
Dept: RADIOLOGY | Facility: MEDICAL CENTER | Age: 40
End: 2025-01-22
Attending: STUDENT IN AN ORGANIZED HEALTH CARE EDUCATION/TRAINING PROGRAM
Payer: COMMERCIAL

## 2025-01-22 DIAGNOSIS — R10.2 PELVIC PAIN: ICD-10-CM

## 2025-01-22 PROCEDURE — 76830 TRANSVAGINAL US NON-OB: CPT

## 2025-01-27 ENCOUNTER — HOSPITAL ENCOUNTER (OUTPATIENT)
Dept: RADIOLOGY | Facility: MEDICAL CENTER | Age: 40
End: 2025-01-27
Attending: NURSE PRACTITIONER
Payer: COMMERCIAL

## 2025-01-27 DIAGNOSIS — N63.0 MASS OF BREAST, UNSPECIFIED LATERALITY: ICD-10-CM

## 2025-01-27 PROCEDURE — G0279 TOMOSYNTHESIS, MAMMO: HCPCS

## 2025-01-27 PROCEDURE — 76642 ULTRASOUND BREAST LIMITED: CPT | Mod: LT

## 2025-01-29 ENCOUNTER — TELEPHONE (OUTPATIENT)
Dept: OBGYN | Facility: CLINIC | Age: 40
End: 2025-01-29
Payer: COMMERCIAL

## 2025-01-30 NOTE — TELEPHONE ENCOUNTER
- Called pt and left msg for patient to call back to review results.   - Plan to review pelvic/transvag US which showed normal endometrial thickness (measuring 3mm), small nabothian cyst, normal right ovary (no cysts on right ovary), left ovary there was a 4.1 cm cyst. Since the cyst is <5cm, it does not necessarily warrant a repeat US but if pelvic pain persists/continues/worsens, can do f/u US in 3-6 months and f/u with physician.

## 2025-01-31 ENCOUNTER — HOSPITAL ENCOUNTER (EMERGENCY)
Facility: MEDICAL CENTER | Age: 40
End: 2025-01-31
Attending: EMERGENCY MEDICINE
Payer: COMMERCIAL

## 2025-01-31 ENCOUNTER — APPOINTMENT (OUTPATIENT)
Dept: RADIOLOGY | Facility: MEDICAL CENTER | Age: 40
End: 2025-01-31
Attending: EMERGENCY MEDICINE
Payer: COMMERCIAL

## 2025-01-31 VITALS
TEMPERATURE: 97.8 F | OXYGEN SATURATION: 96 % | RESPIRATION RATE: 18 BRPM | SYSTOLIC BLOOD PRESSURE: 116 MMHG | DIASTOLIC BLOOD PRESSURE: 68 MMHG | HEIGHT: 63 IN | WEIGHT: 160 LBS | BODY MASS INDEX: 28.35 KG/M2 | HEART RATE: 75 BPM

## 2025-01-31 DIAGNOSIS — M25.562 ACUTE PAIN OF LEFT KNEE: ICD-10-CM

## 2025-01-31 DIAGNOSIS — R35.0 URINARY FREQUENCY: ICD-10-CM

## 2025-01-31 LAB
APPEARANCE UR: CLEAR
BILIRUB UR QL STRIP.AUTO: NEGATIVE
COLOR UR: YELLOW
GLUCOSE UR STRIP.AUTO-MCNC: NEGATIVE MG/DL
KETONES UR STRIP.AUTO-MCNC: NEGATIVE MG/DL
LEUKOCYTE ESTERASE UR QL STRIP.AUTO: NEGATIVE
MICRO URNS: NORMAL
NITRITE UR QL STRIP.AUTO: NEGATIVE
PH UR STRIP.AUTO: 6 [PH] (ref 5–8)
PROT UR QL STRIP: NEGATIVE MG/DL
RBC UR QL AUTO: NEGATIVE
SP GR UR STRIP.AUTO: 1.02
UROBILINOGEN UR STRIP.AUTO-MCNC: 1 EU/DL

## 2025-01-31 PROCEDURE — 99284 EMERGENCY DEPT VISIT MOD MDM: CPT

## 2025-01-31 PROCEDURE — 81003 URINALYSIS AUTO W/O SCOPE: CPT

## 2025-01-31 PROCEDURE — 700102 HCHG RX REV CODE 250 W/ 637 OVERRIDE(OP): Performed by: EMERGENCY MEDICINE

## 2025-01-31 PROCEDURE — A9270 NON-COVERED ITEM OR SERVICE: HCPCS | Performed by: EMERGENCY MEDICINE

## 2025-01-31 PROCEDURE — 73564 X-RAY EXAM KNEE 4 OR MORE: CPT | Mod: LT

## 2025-01-31 RX ORDER — OXYCODONE HYDROCHLORIDE 5 MG/1
5 TABLET ORAL ONCE
Status: COMPLETED | OUTPATIENT
Start: 2025-01-31 | End: 2025-01-31

## 2025-01-31 RX ORDER — ACETAMINOPHEN 500 MG
1000 TABLET ORAL ONCE
Status: COMPLETED | OUTPATIENT
Start: 2025-01-31 | End: 2025-01-31

## 2025-01-31 RX ORDER — OXYCODONE HYDROCHLORIDE 5 MG/1
5 TABLET ORAL EVERY 6 HOURS PRN
Qty: 12 TABLET | Refills: 0 | Status: SHIPPED | OUTPATIENT
Start: 2025-01-31 | End: 2025-01-31

## 2025-01-31 RX ORDER — OXYCODONE HYDROCHLORIDE 5 MG/1
5 TABLET ORAL EVERY 6 HOURS PRN
Qty: 12 TABLET | Refills: 0 | Status: SHIPPED | OUTPATIENT
Start: 2025-01-31 | End: 2025-02-03

## 2025-01-31 RX ORDER — IBUPROFEN 600 MG/1
600 TABLET, FILM COATED ORAL ONCE
Status: COMPLETED | OUTPATIENT
Start: 2025-01-31 | End: 2025-01-31

## 2025-01-31 RX ADMIN — ACETAMINOPHEN 1000 MG: 500 TABLET ORAL at 18:34

## 2025-01-31 RX ADMIN — IBUPROFEN 600 MG: 600 TABLET, FILM COATED ORAL at 18:34

## 2025-01-31 RX ADMIN — OXYCODONE 5 MG: 5 TABLET ORAL at 18:33

## 2025-01-31 ASSESSMENT — FIBROSIS 4 INDEX: FIB4 SCORE: .4535573676110726727

## 2025-01-31 NOTE — TELEPHONE ENCOUNTER
"Caller Name: Naomi Peters    Call Back Number: [phone]       How would the patient prefer to be contacted with a response: Phone call do NOT leave a detailed message    Pt called back in regards to Sena's message\"- Called pt and left msg for patient to call back to review results.   - Plan to review pelvic/transvag US which showed normal endometrial thickness (measuring 3mm), small nabothian cyst, normal right ovary (no cysts on right ovary), left ovary there was a 4.1 cm cyst. Since the cyst is <5cm, it does not necessarily warrant a repeat US but if pelvic pain persists/continues/worsens, can do f/u US in 3-6 months and f/u with physician. \"    Pt informed and verbalized understanding.  "

## 2025-01-31 NOTE — LETTER
PHYSICIAN’S AND CHIROPRACTIC PHYSICIAN'S   PROGRESS REPORT   CERTIFICATION OF DISABILITY Claim Number:     Social Security Number:    Patient’s Name: Naomi Peters Date of Injury: 7/11/2023   Employer: [employer] Name of [employer]CO (if applicable):      Patient’s Job Description/Occupation: Teacher       Previous Injuries/Diseases/Surgeries Contributing to the Condition:  Knee injury 2023      Diagnosis: (M25.562) Acute pain of left knee  (R35.0) Urinary frequency      Related to the Industrial Injury? Yes     Explain: Previous knee injury, continued pain, worsening pain over the last several days      Objective Medical Findings: Knee tenderness palpation, x-rays negative, placed the patient in a knee immobilizer         None - Discharged                         Stable  No                 Ratable  No        Generally Improved                      X   Condition Worsened               X   Condition Same  May Have Suffered a Permanent Disability No     Treatment Plan:    Knee immobilizer, follow-up with orthopedics         No Change in Therapy                  PT/OT Prescribed                      Medication May be Used While Working        Case Management                          PT/OT Discontinued    Consultation    Further Diagnostic Studies:    Prescription(s)                 Released to FULL DUTY /No Restrictions on (Date):       Certified TOTALLY TEMPORARILY DISABLED (Indicate Dates) From:   To:    X  Released to RESTRICTED/Modified Duty on (Date): From:   To:    Restrictions Are:  Temporary      No Sitting    No Standing    No Pulling Other: No bending knee       No Bending at Waist     No Stooping     No Lifting        No Carrying     No Walking Lifting Restricted to (lbs.):          No Pushing        No Climbing     No Reaching Above Shoulders       Date of Next Visit:    Date of this Exam: 1/31/2025 Physician/Chiropractic Physician Name:   Tu Szymanski M.D. Physician/Chiropractic Physician  Signature:  MICHAEL Lew M.D.   [place]:  [address] [place] [address], [address] [place], [place] [address] - Telephone [phone] [place]:  [facility]  [facility], [address] [place], [place] [address] - Telephone [phone]    [email or web address]  D-39 (Rev. 10/24)

## 2025-02-01 NOTE — ED TRIAGE NOTES
"Chief Complaint   Patient presents with    Knee Pain     Pt states that she has had a L knee injury since 2023, pt states that the past few days she has increasing pain over the past few days , pt denies any new injury to the extremity. + swelling       Ambulatory to triage for above complaint.     Pt is alert and oriented, speaking in full sentences, follows commands and responds appropriately to questions. NAD. Resp are even and unlabored.      Pt placed in lobby. Pt educated on triage process. Pt encouraged to alert staff for any changes.     Patient and staff wearing appropriate PPE    /70   Pulse 91   Temp 36.6 °C (97.8 °F) (Temporal)   Resp 16   Ht 1.6 m (5' 3\")   Wt 72.6 kg (160 lb)   SpO2 96%   BMI 28.34 kg/m²     "

## 2025-02-01 NOTE — ED PROVIDER NOTES
ER Provider Note    Scribed for Tu Szymanski M.D. by Gila Kessler. 1/31/2025   5:38 PM    Primary Care Provider: BERNICE Díaz    CHIEF COMPLAINT  Chief Complaint   Patient presents with    Knee Pain     Pt states that she has had a L knee injury since 2023, pt states that the past few days she has increasing pain over the past few days , pt denies any new injury to the extremity. + swelling     EXTERNAL RECORDS REVIEWED  Outpatient Notes Patient was seen by OB/GYN on 12/09/2024 for a well women routine gynecological exam.     HPI/ROS  LIMITATION TO HISTORY   Select: : None  OUTSIDE HISTORIAN(S):  None     Namoi Peters is a 39 y.o. female with history of prior left knee injury presenting to the ED for left knee pain. Patient states that this particular episode started 2-3 weeks ago. Yesterday night she states that the pain became severe and that she took a previously prescribed Norco. She has also been taking Tylenol, Ibuprofen, and muscle relaxer for her pain with no significant improvement. She last took Tylenol/Ibuprofen at 11 AM. Patient does not report any recent falls but does note that she has been walking a lot. Additionally patient admits to having urinary frequency. She denies dysuria, fever, nausea, vomiting, abdominal pain, or any other symptoms and injuries at this time.    PAST MEDICAL HISTORY  Past Medical History:   Diagnosis Date    Anxiety     Depression     Eczema 02/07/2018    Migraine     PCOS (polycystic ovarian syndrome)      SURGICAL HISTORY  Past Surgical History:   Procedure Laterality Date    VT LARYNGOSCOPY,DIRECT,DIAGNOSTIC Bilateral 04/25/2024    Procedure: SEPTOPLASTY, LARYNGOSCOPY, DIRECT Without biopsy, BILATERAL EXCISION INFERIOR TURBINATE PARTIAL;  Surgeon: John Mercer M.D.;  Location: SURGERY SAME DAY [facility];  Service: Ent    VT EXCISION TURBINATE Bilateral 04/25/2024    Procedure: EXCISION, NASAL TURBINATE;  Surgeon: John Mercer M.D.;   Location: SURGERY SAME DAY [facility];  Service: Ent    OTHER      back surgery     FAMILY HISTORY  Family History   Problem Relation Age of Onset    Diabetes Mother     Anxiety disorder Mother     Depression Mother     Ovarian Cancer Mother     Rheumatologic Disease Mother         Lupus    Alcohol abuse Father     Alcohol abuse Brother     Schizophrenia Brother     Anxiety disorder Brother     Depression Brother     Colon Cancer Maternal Aunt     Breast Cancer Paternal Aunt     Breast Cancer Paternal Aunt     Heart Disease Maternal Grandmother     Pancreatic Cancer Paternal Grandmother      SOCIAL HISTORY   reports that she has never smoked. She has never used smokeless tobacco. She reports current alcohol use of about 0.6 oz of alcohol per week. She reports that she does not use drugs.    CURRENT MEDICATIONS  Discharge Medication List as of 1/31/2025  9:11 PM        CONTINUE these medications which have NOT CHANGED    Details   tizanidine (ZANAFLEX) 4 MG Tab Take 1 Tablet by mouth every 6 hours as needed (low back pain)., Disp-30 Tablet, R-3, Normal      metFORMIN ER (GLUCOPHAGE XR) 500 MG TABLET SR 24 HR Take 1 Tablet by mouth 2 times a day., Disp-180 Tablet, R-3, Normal      Semaglutide, 2 MG/DOSE, (OZEMPIC, 2 MG/DOSE,) 8 MG/3ML Solution Pen-injector DIAL AND INJECT UNDER THE SKIN 2 MG EVERY 7 DAYS, Disp-3 mL, R-6, Normal      Ciclopirox 1 % Shampoo Use daily for 1-2 weeks until resolved, then can use 2-3 times per week as needed, Disp-120 mL, R-3, Normal      tretinoin (RETIN-A) 0.025 % cream AAA at bedtime, pea sized amount after moisturizer, start 2-3 times per week, increase as tolerated, Disp-45 g, R-1, Normal      venlafaxine ER (EFFEXOR) 225 MG TABLET SR 24 HR tablet Take 225 mg by mouth every day., Historical Med      busPIRone (BUSPAR) 30 MG tablet 30 mg 2 times a day., Historical Med      LORazepam (ATIVAN) 1 MG Tab Take 1 mg by mouth every 6 hours as needed., Historical Med        "    ALLERGIES  Allergies   Allergen Reactions    Cat Hair Extract      Other reaction(s): Not available    Cat Pelt Standardized Allergenic Extract        PHYSICAL EXAM  /70   Pulse 91   Temp 36.6 °C (97.8 °F) (Temporal)   Resp 16   Ht 1.6 m (5' 3\")   Wt 72.6 kg (160 lb)   SpO2 96%   BMI 28.34 kg/m²    Constitutional: Well developed, Well nourished, in no distress,    HENT: Normocephalic, Atraumatic   Eyes: PERRLA, EOMI, Conjunctiva normal, No discharge.   Neck: No tenderness, Supple, No stridor.   Cardiovascular: Normal heart rate, Normal rhythm.   Thorax & Lungs: Clear to auscultation bilaterally, No respiratory distress.   Back:No CVA tenderness  Abdomen: Soft, No tenderness, No masses.   Skin: Warm, Dry, No rash.    Musculoskeletal: Tenderness to palpation throughout left knee, no soft tissue swelling or erythema, slight decreased range of motion secondary to pain. No major deformities noted.  Neurologic: Awake, alert. Moves all extremities spontaneously.  Psychiatric: Affect normal, Judgment normal, Mood normal.     DIAGNOSTIC STUDIES    Labs:   Results for orders placed or performed during the hospital encounter of 01/31/25   URINALYSIS,CULTURE IF INDICATED    Collection Time: 01/31/25  7:45 PM    Specimen: Urine, Clean Catch   Result Value Ref Range    Color Yellow     Character Clear     Specific Gravity 1.025 <1.035    Ph 6.0 5.0 - 8.0    Glucose Negative Negative mg/dL    Ketones Negative Negative mg/dL    Protein Negative Negative mg/dL    Bilirubin Negative Negative    Urobilinogen, Urine 1.0 <=1.0 EU/dL    Nitrite Negative Negative    Leukocyte Esterase Negative Negative    Occult Blood Negative Negative    Micro Urine Req see below      *Note: Due to a large number of results and/or encounters for the requested time period, some results have not been displayed. A complete set of results can be found in Results Review.     Radiology:   This attending emergency physician has independently " interpreted the diagnostic imaging associated with this visit and is awaiting the final reading from the radiologist.   Preliminary interpretation is a follows: No fracture  Radiologist interpretation:   DX-KNEE COMPLETE 4+ LEFT   Final Result      No radiographic evidence of acute traumatic injury.           COURSE & MEDICAL DECISION MAKING       INITIAL ASSESSMENT, COURSE AND PLAN      Care Narrative: Left knee pain, x-rays negative follow-up with the patient a knee immobilizer, give the patient some pain medicines, have the patient follow-up with orthopedics.  I did fill out the Worker's Comp. form.  The patient is also having urinary frequency, urinalysis is negative.    5:38 PM - Patient was evaluated at bedside. Ordered for urinalysis with culture if indicated and DX-Knee Complete 4+ left to evaluate. The patient will be medicated with Tylenol 1,000 MG PI, Motrin 600 MG PO, and Roxicodone 5 MG PO for her symptoms. Patient verbalizes understanding and support with my plan of care.     8:08 PM - Patient was reevaluated at bedside. Discussed results with patient and plan for discharge. Patient was given the opportunity for questions which were answered appropriately.  Patient is to return to the ED for new or worsening symptoms or any additional concerns.  Patient has verbalized understanding and agreement to this plan. Patient is stable for discharge at this time.       DISPOSITION AND DISCUSSIONS    I have discussed management of the patient with the following physicians and JERMAN's:  None     Discussion of management with other Q or appropriate source(s): None     Escalation of care considered, and ultimately not performed: Laboratory analysis.    Decision tools and prescription drugs considered including, but not limited to: Pain Medications   .    In prescribing controlled substances to this patient, I certify that I have obtained and reviewed the medical history of Naomi Peters. I have also made  a good robyn effort to obtain applicable records from other providers who have treated the patient and no other records are available at this time.     I have conducted a physical exam and documented it. I have reviewed Ms. Peters’s prescription history as maintained by the [place] Prescription Monitoring Program.     I have assessed the patient’s risk for abuse, dependency, and addiction using the validated Opioid Risk Tool available at https://www.mdcalc.com/nohhvv-gfqg-uqku-ort-narcotic-abuse.     Given the above, I believe the benefits of controlled substance therapy outweigh the risks. The reasons for prescribing controlled substances include non-narcotic, oral analgesic alternatives have been inadequate for pain control. Accordingly, I have discussed the risk and benefits, treatment plan, and alternative therapies with the patient.       DISPOSITION:  Patient will be discharged home in stable condition.    FOLLOW UP:  [facility], Emergency Dept  [address] [id number] [phone]    If symptoms worsen    Toni Rowell M.D.  [address] [id number] [phone]          Zeenat Salazar  [address] [facility] [address]  [phone]            FINAL DIAGNOSIS  1. Acute pain of left knee    2. Urinary frequency       IGila (Scribe), am scribing for, and in the presence of, Tu Szymanski M.D..    Electronically signed by: Gila Kessler (Bhavnaibe), 1/31/2025    Tu CLEVELAND M.D. personally performed the services described in this documentation, as scribed by Gila Kessler in my presence, and it is both accurate and complete.      The note accurately reflects work and decisions made by me.  Tu Szymanski M.D.  1/31/2025  10:33 PM

## 2025-02-23 DIAGNOSIS — L21.9 SEBORRHEA: ICD-10-CM

## 2025-02-24 RX ORDER — CICLOPIROX 1 G/100ML
SHAMPOO TOPICAL
Qty: 120 ML | Refills: 3 | Status: SHIPPED | OUTPATIENT
Start: 2025-02-24

## 2025-02-24 NOTE — TELEPHONE ENCOUNTER
CICLOPIROX 1% SHAMPOO   Received request via: Pharmacy    Was the patient seen in the last year in this department? Yes    Does the patient have an active prescription (recently filled or refills available) for medication(s) requested? No    Pharmacy Name: [facility] [id number] - [place], [place] - [address]     Does the patient have [facility] Plus and need 100-day supply? (This applies to ALL medications) Patient does not have SCP

## 2025-03-16 DIAGNOSIS — E28.2 PCOS (POLYCYSTIC OVARIAN SYNDROME): ICD-10-CM

## 2025-03-16 RX ORDER — METFORMIN HYDROCHLORIDE 500 MG/1
1000 TABLET, EXTENDED RELEASE ORAL 2 TIMES DAILY
Qty: 360 TABLET | Refills: 3 | Status: SHIPPED | OUTPATIENT
Start: 2025-03-16

## 2025-03-18 ENCOUNTER — RESULTS FOLLOW-UP (OUTPATIENT)
Dept: MEDICAL GROUP | Facility: MEDICAL CENTER | Age: 40
End: 2025-03-18
Payer: COMMERCIAL

## 2025-03-31 ENCOUNTER — APPOINTMENT (OUTPATIENT)
Dept: MEDICAL GROUP | Facility: MEDICAL CENTER | Age: 40
End: 2025-03-31
Payer: COMMERCIAL

## 2025-04-01 ENCOUNTER — APPOINTMENT (OUTPATIENT)
Dept: MEDICAL GROUP | Facility: MEDICAL CENTER | Age: 40
End: 2025-04-01
Payer: COMMERCIAL

## 2025-04-01 VITALS
SYSTOLIC BLOOD PRESSURE: 108 MMHG | TEMPERATURE: 98.1 F | BODY MASS INDEX: 27.81 KG/M2 | RESPIRATION RATE: 16 BRPM | HEIGHT: 63 IN | DIASTOLIC BLOOD PRESSURE: 60 MMHG | HEART RATE: 86 BPM | WEIGHT: 156.97 LBS | OXYGEN SATURATION: 96 %

## 2025-04-01 DIAGNOSIS — Z80.9 FAMILY HISTORY OF CANCER: ICD-10-CM

## 2025-04-01 DIAGNOSIS — M70.62 TROCHANTERIC BURSITIS OF BOTH HIPS: ICD-10-CM

## 2025-04-01 DIAGNOSIS — E55.9 VITAMIN D DEFICIENCY: ICD-10-CM

## 2025-04-01 DIAGNOSIS — M70.61 TROCHANTERIC BURSITIS OF BOTH HIPS: ICD-10-CM

## 2025-04-01 DIAGNOSIS — G89.29 CHRONIC MIDLINE LOW BACK PAIN WITHOUT SCIATICA: ICD-10-CM

## 2025-04-01 DIAGNOSIS — M54.50 CHRONIC MIDLINE LOW BACK PAIN WITHOUT SCIATICA: ICD-10-CM

## 2025-04-01 PROCEDURE — 3078F DIAST BP <80 MM HG: CPT | Performed by: NURSE PRACTITIONER

## 2025-04-01 PROCEDURE — 3074F SYST BP LT 130 MM HG: CPT | Performed by: NURSE PRACTITIONER

## 2025-04-01 PROCEDURE — 99213 OFFICE O/P EST LOW 20 MIN: CPT | Performed by: NURSE PRACTITIONER

## 2025-04-01 ASSESSMENT — FIBROSIS 4 INDEX: FIB4 SCORE: .4535573676110726727

## 2025-04-01 ASSESSMENT — PATIENT HEALTH QUESTIONNAIRE - PHQ9: CLINICAL INTERPRETATION OF PHQ2 SCORE: 0

## 2025-04-01 NOTE — PROGRESS NOTES
Subjective:     Naomi Peters is a 39 y.o. female presents to discuss:     Chief Complaint   Patient presents with    Back Pain     Tail bone pain for a few months now. Hurts manly when sleeping or standing. Been going to pain specialist but it is not helping. They gave her 2-3  Cortizone shots    Hip Pain     Right in the middle on both hips for a few months when sleeping and standing. Been going to pain specialist but it is not helping. Gave her 2-3 Cortizone shots.      Verbal consent was acquired by the patient to use [employer] ambient listening note generation during this visit Yes   History of Present Illness  The patient is a 39-year-old female who presents for evaluation of back pain, hip pain, and vitamin D deficiency.    She reports persistent pain lower midline spine. L4-S1 area.  The pain disrupts her sleep, causing discomfort regardless of her sleeping position. She had been evaluated and treated by Physiatry and has received injections however not significant relief. She has had MRI and lumbar spine imaging without significant structural changes.     She also reports ongoing bilateral hip pain.  Pain is aggravated by laying on her side.  Sleep is disrupted.  Chart review notes that she has been evaluated and treated for trochanteric bursitis.      She has not engaged in formal physical therapy for her back or hips     She is open to physical therapy sessions.  Stretching exercises provide some relief. She has found some relief with lidocaine patches and anti-inflammatory medication prescribed for a previous knee injury.     She reports that her brother was diagnosed with pancreatic cancer as well as a maternal aunt diagnosed with colon cancer.   -Not certain if she completed [employer].    -Recommend she complete.     She has low vitamin D levels and is supposed to take vitamin D supplements but lost the paper with the dosage information.    Supplemental Information  She has an  "appointment with her endocrinologist next week.    SOCIAL HISTORY  She works as a .    FAMILY HISTORY  Her mother has lupus and arthritis. Her maternal aunt  of colon cancer at age 59. Her mother had ovarian cancer. Her paternal grandmother  of pancreatic cancer, and her brother also has pancreatic cancer. Three of her paternal aunts had breast cancer.      ROS: : see above      Current Outpatient Medications:     metFORMIN ER (GLUCOPHAGE XR) 500 MG TABLET SR 24 HR, TAKE 2 TABLETS BY MOUTH TWICE A DAY, Disp: 360 Tablet, Rfl: 3    Ciclopirox 1 % Shampoo, USE TOPICALLY ON AFFECTED AREA DAILY FOR 1 TO 2 WEEKS UNTIL RESOLVED, THEN CAN USE 2-3 TIMES PER WEEK AS NEEDED, Disp: 120 mL, Rfl: 3    tizanidine (ZANAFLEX) 4 MG Tab, Take 1 Tablet by mouth every 6 hours as needed (low back pain)., Disp: 30 Tablet, Rfl: 3    Semaglutide, 2 MG/DOSE, (OZEMPIC, 2 MG/DOSE,) 8 MG/3ML Solution Pen-injector, DIAL AND INJECT UNDER THE SKIN 2 MG EVERY 7 DAYS, Disp: 3 mL, Rfl: 6    tretinoin (RETIN-A) 0.025 % cream, AAA at bedtime, pea sized amount after moisturizer, start 2-3 times per week, increase as tolerated, Disp: 45 g, Rfl: 1    venlafaxine ER (EFFEXOR) 225 MG TABLET SR 24 HR tablet, Take 225 mg by mouth every day., Disp: , Rfl:     busPIRone (BUSPAR) 30 MG tablet, 30 mg 2 times a day., Disp: , Rfl:     LORazepam (ATIVAN) 1 MG Tab, Take 1 mg by mouth every 6 hours as needed., Disp: , Rfl:     Allergies   Allergen Reactions    Cat Hair Extract      Other reaction(s): Not available    Cat Pelt Standardized Allergenic Extract       Objective:   Vitals: /60   Pulse 86   Temp 36.7 °C (98.1 °F) (Temporal)   Resp 16   Ht 1.6 m (5' 3\")   Wt 71.2 kg (156 lb 15.5 oz)   LMP 2025   SpO2 96%   BMI 27.81 kg/m²    General: Alert, pleasant, NAD  HEENT: Normocephalic.  Neck supple.   Respiratory: no distress, no audible wheezing, RR -WNL  Skin: Warm, dry, no rashes.  Extremities: No leg edema. No " discoloration  Neurological: No tremors  Psych:  Affect/mood is normal, judgement is good, memory is intact, grooming is appropriate.  Assessment/Plan:      1. Trochanteric bursitis of both hips  - Referral to Physical Therapy    2. Chronic midline low back pain without sciatica  - Referral to Physical Therapy    3. Family history of cancer  - Referral to Genetics    4. Vitamin D deficiency       Assessment & Plan  1. Back pain.  Ongoing problem.  X-ray and lumbar MRI overall unremarkable for significant structural changes, spondylolisthesis.  Previously sought evaluation with physiatry however notes that she continues to report pain.  Has not completed any physical therapy.  Recommend consulting with physical therapy.  Recommend Pilates help strengthen her core.     2. Hip pain.  The patient's hip pain may be related to potential trochanteric bursitis.  Recommend consulting with physical therapy.  Referral placed.    3. Vitamin D deficiency.  She has been advised to take vitamin D3 supplements at a dosage of 4000 IU daily for a duration of 3 months, after which the dosage can be reduced to 2000 IU daily.    4. Genetic risk for cancer.  Given her family history of various cancers, including colon cancer, ovarian cancer, pancreatic cancer, and breast cancer, a referral to medical genetics has been initiated for further evaluation and potential genetic testing.    PROCEDURE  The patient has received cortisone injections in the past for back pain and hip pain.    Return if symptoms worsen or fail to improve.    {I have placed the above orders and discussed them with an approved delegating provider. The MA is performing the below orders under the direction of Dr. Jonh QUEEN    Health maintenance:    General Recommendations:   Smoking: recommend complete avoidance of all tobacco products  Alcohol: recommend limiting consumption  Physical Activity: goal is 30 min of moderate activity 5 times  a week  Weight Management and Nutrition: high vegetable, high protein diet like the Mediterranean diet, portion control, avoid excessive sugars, Low Glycemic Index foods, adequate hydration, sleep.

## 2025-04-07 DIAGNOSIS — L21.9 SEBORRHEA: ICD-10-CM

## 2025-04-07 RX ORDER — CICLOPIROX 1 G/100ML
SHAMPOO TOPICAL
Qty: 120 ML | Refills: 3 | Status: SHIPPED | OUTPATIENT
Start: 2025-04-07

## 2025-04-07 NOTE — Clinical Note
REFERRAL APPROVAL NOTICE         Sent on April 7, 2025                   Colleen Peters  [address]  [facility] [address]                   Dear Ms. Peters,    After a careful review of the medical information and benefit coverage, [facility] has processed your referral. See below for additional details.    If applicable, you must be actively enrolled with your insurance for coverage of the authorized service. If you have any questions regarding your coverage, please contact your insurance directly.    REFERRAL INFORMATION   Referral #:  [id number]  Referred-To Department    Referred-By Provider:  Physical Therapy    BERNICE Díaz   [facility] [address]      [address]  [address]  [place] [address] [phone] [address]  [address]  [place] [id number] [phone]    Referral Start Date:  04/01/2025  Referral End Date:   04/01/2026             SCHEDULING  If you do not already have an appointment, please call [phone] to make an appointment.     MORE INFORMATION  If you do not already have a [employer] account, sign up at: [email or web address]  You can access your medical information, make appointments, see lab results, billing information, and more.  If you have questions regarding this referral, please contact  the [facility] Referrals department at:             [phone]. Monday - Friday 8:00AM - 5:00PM.     Sincerely,    [facility]

## 2025-04-07 NOTE — TELEPHONE ENCOUNTER
Received request via: Patient    Was the patient seen in the last year in this department? Yes    Does the patient have an active prescription (recently filled or refills available) for medication(s) requested? No      Does the patient have [facility] Plus and need 100-day supply? (This applies to ALL medications) Patient does not have SCP

## 2025-04-10 ENCOUNTER — OFFICE VISIT (OUTPATIENT)
Dept: ENDOCRINOLOGY | Facility: MEDICAL CENTER | Age: 40
End: 2025-04-10
Attending: INTERNAL MEDICINE
Payer: COMMERCIAL

## 2025-04-10 VITALS
WEIGHT: 153 LBS | SYSTOLIC BLOOD PRESSURE: 118 MMHG | DIASTOLIC BLOOD PRESSURE: 62 MMHG | HEART RATE: 86 BPM | HEIGHT: 63 IN | OXYGEN SATURATION: 100 % | BODY MASS INDEX: 27.11 KG/M2

## 2025-04-10 DIAGNOSIS — E66.9 OBESITY (BMI 35.0-39.9 WITHOUT COMORBIDITY): ICD-10-CM

## 2025-04-10 DIAGNOSIS — E28.2 PCOS (POLYCYSTIC OVARIAN SYNDROME): ICD-10-CM

## 2025-04-10 DIAGNOSIS — Z87.2: ICD-10-CM

## 2025-04-10 DIAGNOSIS — E22.1 HYPERPROLACTINEMIA (HCC): ICD-10-CM

## 2025-04-10 DIAGNOSIS — E55.9 VITAMIN D DEFICIENCY: ICD-10-CM

## 2025-04-10 PROCEDURE — 99211 OFF/OP EST MAY X REQ PHY/QHP: CPT | Performed by: INTERNAL MEDICINE

## 2025-04-10 PROCEDURE — 3078F DIAST BP <80 MM HG: CPT | Performed by: INTERNAL MEDICINE

## 2025-04-10 PROCEDURE — 3074F SYST BP LT 130 MM HG: CPT | Performed by: INTERNAL MEDICINE

## 2025-04-10 PROCEDURE — 99214 OFFICE O/P EST MOD 30 MIN: CPT | Performed by: INTERNAL MEDICINE

## 2025-04-10 RX ORDER — METHYLPHENIDATE 2.2 MG/H
1 PATCH TRANSDERMAL DAILY
COMMUNITY

## 2025-04-10 ASSESSMENT — FIBROSIS 4 INDEX: FIB4 SCORE: .4535573676110726727

## 2025-04-10 NOTE — PROGRESS NOTES
Chief Complaint: Follow up for hyperprolactinemia that is idiopathic, PCOS and obesity.      HPI:     Naomi Peters is a 39 y.o. female here for follow up of the above medical issues    She was found to have elevated prolactin levels of 28 on April 2023 associated with normal TSH levels.  Baseline pituitary MRI on June 2023 for the prolactin elevation showed no focal adenoma    PCOS was diagnosed by another provider years ago ( had hirsutism and irregular cycles) and she was treated with Ozempic and then I added metformin extended release which she was able to tolerate.  She previously tried and failed generic metformin immediate release due to nausea.    She was also previously taking Inositol for PCOS    Her baseline workup for PCOS showed elevated free and total testosterone levels of 75 and 10 respectively on May 2023, IGF-I levels were normal prolactin was found to be elevated again at 28, midnight saliva cortisol x 3 on May 16, 2023 was normal at 0.015, 0.026 and 0.070, DHEA-sulfate was normal at 130, and 17 hydroxyprogesterone was not tested.        On follow-up she forgot to do her labs  She didn't tolerate the  Metformin ER (1000mg)  She denies severe hirsutism  She denies galactorrhea    Again we do not have updated labs on her androgen levels      She is still on Ozempic 2 mg weekly   She reports  weight loss of 25 lbs since last visit  Her baseline weight was 222 lbs.  Her current weight is 153 lbs (177 lbs)  She lost > 5% from baseline  She watches her food portions  She does walk 1 mile daily for 30 mins      PREVIOUS LABS    prolactin is normal at 16 on March 2024 and total testosterone was 42 on 3/2024  Free testosterone was 5 on 3/2024         Patient's medications, allergies, and social histories were reviewed and updated as appropriate.      ROS:     CONS:     No fever, no chills   EYES:     No diplopia, no blurry vision   CV:           No chest pain, no palpitations   PULM:     No SOB,  no cough, no hemoptysis.   GI:            No nausea, no vomiting, no diarrhea, no constipation   ENDO:     No polyuria, no polydipsia, no heat intolerance, no cold intolerance       Past Medical History:  Problem List:  2025-04: Vitamin D deficiency  2024-05: Panic attacks  2024-05: Nexplanon in place  2023-11: ASCUS with positive high risk HPV cervical  2023-08: Tear of medial meniscus of knee  2023-05: Hyperprolactinemia (HCC)  2021-08: Pharyngitis due to Streptococcus species  2021-07: Attention deficit hyperactivity disorder (ADHD), inattentive   type, mild  2020-11: Prediabetes  2020-10: Concentration deficit  2019-05: BRCA gene mutation negative  2019-04: Family history of systemic lupus erythematosus  2019-04: MDD (major depressive disorder), recurrent, in partial   remission (HCC)  2019-04: Type 2 diabetes mellitus (HCC)  2019-04: Family history of ovarian cancer  2019-04: Family history of breast cancer  2019-04: Migraine with aura and without status migrainosus, not   intractable  2019-04: Positive TB test  2018-06: Obesity (BMI 35.0-39.9 without comorbidity)  2018-02: SANDI (generalized anxiety disorder)  2018-02: PCOS (polycystic ovarian syndrome)  2018-02: Eczema  2018-02: Anxiety  2018-02: Chronic low back pain      Past Surgical History:  Past Surgical History:   Procedure Laterality Date    ID LARYNGOSCOPY,DIRECT,DIAGNOSTIC Bilateral 04/25/2024    Procedure: SEPTOPLASTY, LARYNGOSCOPY, DIRECT Without biopsy, BILATERAL EXCISION INFERIOR TURBINATE PARTIAL;  Surgeon: John Mercer M.D.;  Location: SURGERY SAME DAY [facility];  Service: Ent    ID EXCISION TURBINATE Bilateral 04/25/2024    Procedure: EXCISION, NASAL TURBINATE;  Surgeon: John Mercer M.D.;  Location: SURGERY SAME DAY [facility];  Service: Ent    LAMINOTOMY      OTHER      back surgery        Allergies:  Cat hair extract     Social History:  Social History     Tobacco Use    Smoking status: Never    Smokeless tobacco: Never   Vaping Use     "Vaping status: Never Used   Substance Use Topics    Alcohol use: Yes     Alcohol/week: 0.6 oz     Types: 1 Shots of liquor per week     Comment: rare    Drug use: Never        Family History:   family history includes Alcohol abuse in her brother and father; Anxiety disorder in her brother and mother; Arthritis in her mother; Breast Cancer in her paternal aunt, paternal aunt, and paternal aunt; Cancer in her mother; Colon Cancer (age of onset: 59) in her maternal aunt; Depression in her brother and mother; Diabetes in her maternal grandmother and mother; Heart Disease in her maternal grandmother; Lupus in her mother; Ovarian Cancer in her mother; Pancreatic Cancer in her brother and paternal grandmother; Psychiatric Illness in her brother; Schizophrenia in her brother; Stroke in her maternal grandfather.      PHYSICAL EXAM:   Vital signs: /62 (BP Location: Left arm, Patient Position: Sitting, BP Cuff Size: Adult)   Pulse 86   Ht 1.6 m (5' 3\")   Wt 69.4 kg (153 lb)   LMP 03/03/2025   SpO2 100%   BMI 27.10 kg/m²   GENERAL: Well-developed, well-nourished in no apparent distress.   EYE:  No ocular asymmetry, PERRLA  HENT: Pink, moist mucous membranes.    NECK: No thyromegaly.   CARDIOVASCULAR:  No murmurs  LUNGS: Clear breath sounds  ABDOMEN: Soft, nontender   EXTREMITIES: No clubbing, cyanosis, or edema.   NEUROLOGICAL: No gross focal motor abnormalities   LYMPH: No cervical adenopathy seen  SKIN: No rashes, lesions.       Labs:  Lab Results   Component Value Date/Time    SODIUM 138 03/04/2024 07:52 AM    POTASSIUM 3.9 03/04/2024 07:52 AM    CHLORIDE 105 03/04/2024 07:52 AM    CO2 23 03/04/2024 07:52 AM    ANION 10.0 03/04/2024 07:52 AM    GLUCOSE 100 (H) 03/04/2024 07:52 AM    BUN 7 (L) 03/04/2024 07:52 AM    CREATININE 0.60 03/04/2024 07:52 AM    CALCIUM 9.1 03/04/2024 07:52 AM    ASTSGOT 21 03/04/2024 07:52 AM    ALTSGPT 28 03/04/2024 07:52 AM    TBILIRUBIN 0.6 03/04/2024 07:52 AM    ALBUMIN 4.0 " "03/04/2024 07:52 AM    TOTPROTEIN 7.0 03/04/2024 07:52 AM    GLOBULIN 3.0 03/04/2024 07:52 AM    AGRATIO 1.3 03/04/2024 07:52 AM       Lab Results   Component Value Date/Time    SODIUM 138 03/04/2024 0752    POTASSIUM 3.9 03/04/2024 0752    CHLORIDE 105 03/04/2024 0752    CO2 23 03/04/2024 0752    GLUCOSE 100 (H) 03/04/2024 0752    BUN 7 (L) 03/04/2024 0752    CREATININE 0.60 03/04/2024 0752    CALCIUM 9.1 03/04/2024 0752    ANION 10.0 03/04/2024 0752       Lab Results   Component Value Date/Time    CHOLSTRLTOT 156 05/25/2022 0809    TRIGLYCERIDE 77 05/25/2022 0809    HDL 46 05/25/2022 0809    LDL 95 05/25/2022 0809       Lab Results   Component Value Date/Time    TSHULTRASEN 1.000 04/27/2023 0738     No results found for: \"FREET4\"  No results found for: \"FREET3\"  No results found for: \"THYSTIMIG\"    No results found for: \"MICROSOMALA\"      Imaging:      ASSESSMENT/PLAN:     1. PCOS (polycystic ovarian syndrome)  Controlled   I want her to complete the previously ordered labs  I am going to replace metformin extended release with immediate release metformin  850 mg daily  She will let me know if she can tolerate this medication  I will see her again in 7 months    2. Obesity (BMI 35.0-39.9 without comorbidity)  Stable  She has obesity at baseline with a BMI greater than 30 and had a workup for hypercortisolism that was negative.  She tried and failed diet and exercise.  Thyroid function is normal.        She has lost greater than 5% of her baseline weight and has a positive response to medical therapy     She is going to check with her insurance if Mounjaro or Zepbound is covered under her insurance and she will let me know    Continue Ozempic 2 mg weekly.   Combined with diet and exercise.  Continue monitoring weight        3. Hyperprolactinemia (HCC)  Stable   Again she needs to complete the previously ordered labs to make sure her prolactin is normal    4. Vitamin D deficiency  Stable   Patient needs to complete " previously ordered labs      5. History of female hirsutism  Controlled   Patient needs to complete previously ordered labs        Return in about 7 months (around 11/10/2025).      Thank you kindly for allowing me to participate in the thyroid care plan for this patient.    Guido Keating MD, FACE, ECNU      CC:   Pcp Pt States None

## 2025-04-15 ENCOUNTER — OFFICE VISIT (OUTPATIENT)
Dept: PHYSICAL MEDICINE AND REHAB | Facility: MEDICAL CENTER | Age: 40
End: 2025-04-15
Payer: COMMERCIAL

## 2025-04-15 VITALS
TEMPERATURE: 99.1 F | WEIGHT: 154.32 LBS | BODY MASS INDEX: 27.34 KG/M2 | HEART RATE: 90 BPM | DIASTOLIC BLOOD PRESSURE: 78 MMHG | OXYGEN SATURATION: 97 % | HEIGHT: 63 IN | SYSTOLIC BLOOD PRESSURE: 120 MMHG

## 2025-04-15 DIAGNOSIS — M70.62 GREATER TROCHANTERIC BURSITIS OF LEFT HIP: ICD-10-CM

## 2025-04-15 DIAGNOSIS — G89.29 CHRONIC LOW BACK PAIN, UNSPECIFIED BACK PAIN LATERALITY, UNSPECIFIED WHETHER SCIATICA PRESENT: ICD-10-CM

## 2025-04-15 DIAGNOSIS — M79.10 MYALGIA: ICD-10-CM

## 2025-04-15 DIAGNOSIS — Z71.82 EXERCISE COUNSELING: ICD-10-CM

## 2025-04-15 DIAGNOSIS — M70.61 GREATER TROCHANTERIC BURSITIS OF RIGHT HIP: ICD-10-CM

## 2025-04-15 DIAGNOSIS — M77.9 TENDONITIS: ICD-10-CM

## 2025-04-15 DIAGNOSIS — M54.50 CHRONIC LOW BACK PAIN, UNSPECIFIED BACK PAIN LATERALITY, UNSPECIFIED WHETHER SCIATICA PRESENT: ICD-10-CM

## 2025-04-15 PROCEDURE — 3074F SYST BP LT 130 MM HG: CPT | Performed by: GENERAL PRACTICE

## 2025-04-15 PROCEDURE — 99213 OFFICE O/P EST LOW 20 MIN: CPT | Mod: 25 | Performed by: GENERAL PRACTICE

## 2025-04-15 PROCEDURE — 76942 ECHO GUIDE FOR BIOPSY: CPT | Performed by: GENERAL PRACTICE

## 2025-04-15 PROCEDURE — 3078F DIAST BP <80 MM HG: CPT | Performed by: GENERAL PRACTICE

## 2025-04-15 PROCEDURE — 1125F AMNT PAIN NOTED PAIN PRSNT: CPT | Performed by: GENERAL PRACTICE

## 2025-04-15 PROCEDURE — 20551 NJX 1 TENDON ORIGIN/INSJ: CPT | Performed by: GENERAL PRACTICE

## 2025-04-15 PROCEDURE — 20553 NJX 1/MLT TRIGGER POINTS 3/>: CPT | Mod: 59 | Performed by: GENERAL PRACTICE

## 2025-04-15 RX ORDER — DEXAMETHASONE SODIUM PHOSPHATE 4 MG/ML
4 INJECTION, SOLUTION INTRA-ARTICULAR; INTRALESIONAL; INTRAMUSCULAR; INTRAVENOUS; SOFT TISSUE ONCE
Status: SHIPPED | OUTPATIENT
Start: 2025-04-15 | End: 2025-04-16

## 2025-04-15 ASSESSMENT — PAIN SCALES - GENERAL: PAINLEVEL_OUTOF10: 6=MODERATE PAIN

## 2025-04-15 ASSESSMENT — PATIENT HEALTH QUESTIONNAIRE - PHQ9: CLINICAL INTERPRETATION OF PHQ2 SCORE: 0

## 2025-04-15 ASSESSMENT — FIBROSIS 4 INDEX: FIB4 SCORE: .4535573676110726727

## 2025-04-15 NOTE — PROGRESS NOTES
Physiatry (Physical Medicine and  Rehabilitation)       Patient Name: Naomi Peters   Patient : 1985  PCP: BERNICE Díaz  MRN: [id number]     Date of service: See epic    Chief complaint:   Chief Complaint   Patient presents with    Follow-Up     Myalgia        Referring provider: Christiane Dickerson*        HISTORY    Naomi Peters is a 39 y.o. pleasant female  who presents with a follow up.       Medical records review:  I reviewed the note from the referring provider Christiane Dickerson* including the note dated 24.  10/20/24 ed: lbp,  toradol, tizanidine, roxicodone    Prior Procedures:   Plasma disc compression [facility] 2009  Prior injections in [place].   21 trigger point injections by Dr Romeo Rodriguez w/CS  24 LEFT greater trochanteric bursa injection ultrasound-guided - effective  24 RIGHT right latissimus dorsi/lumbar paraspinal trigger point injections w/CS  24 Left lumbar paraspinal muscles at T12, L2, L4, L5 and left lower latissimus dorsi trigger point injections w/o CS      HPI:    History of Present Illness  The patient, a 39-year-old female, presents with low back pain. She currently reports myalgia and tenderness over the right and left greater trochanters, with a pain intensity of 6 out of 10.    Low Back Pain and Hip Tenderness  She has been under the care of a pain specialist who conducted magnetic resonance imaging (MRI) and provided temporary relief measures. She is awaiting an evaluation for her hips. The pain is localized to her hips and does not radiate distally to her toes. She reports no history of falls or trauma. The patient has been experiencing difficulty sleeping due to the pain, which is exacerbated by pressure on her hips. She has been utilizing lidocaine patches for analgesia. She has received injections for her back pain, which have been effective, particularly on the left side. She has  not yet received any communication from the physical therapist but has been contacted by the genetics laboratory.  - Onset: Not specified.  - Location: Low back, right and left greater trochanters.  - Duration: Not specified.  - Character: Myalgia and tenderness.  - Alleviating/Aggravating Factors: Exacerbated by pressure on hips; lidocaine patches for analgesia; injections effective for back pain, particularly on the left side.  - Timing: Difficulty sleeping due to pain.  - Severity: Pain intensity of 6 out of 10.    Weight Loss Efforts  The patient has been making efforts towards weight loss and acknowledges the need for a consultation with a nutritionist. She has attempted to incorporate protein into her diet through various means such as protein powder, pancakes, shakes, and smoothies, but has found them unpalatable.    MEDICATIONS  Current: lidocaine patches       ROS:   Fever, Chills, Sweats: Denies  Involuntary Weight Loss: Denies  Endorses depression and anxiety.  See HPI.   All other systems reviewed and negative.     Psychological testing for pain as depression and pain commonly coexist and need to both be treated.     Opioid Risk Score: 1      Interpretation of Opioid Risk Score   Score 0-3 = Low risk of abuse. Do UDS at least once per year.  Score 4-7 = Moderate risk of abuse. Do UDS 1-4 times per year.  Score 8+ = High risk of abuse. Refer to specialist.    PHQ      11/12/2024     4:20 PM 4/1/2025    10:40 AM 4/15/2025     4:20 PM   Depression Screen (PHQ-2/PHQ-9)   PHQ-2 Total Score 2 0 0   PHQ-9 Total Score 11         Interpretation of PHQ-9 Total Score   Score Severity   1-4 No Depression   5-9 Mild Depression   10-14 Moderate Depression   15-19 Moderately Severe Depression   20-27 Severe Depression      OCCUPATIONAL history: preK teacher      GOALS OF TREATMENT: Symptom/Pain relief. improve function.        PMHx:   Past Medical History:   Diagnosis Date    Anxiety     Depression     Eczema  02/07/2018    Migraine     PCOS (polycystic ovarian syndrome)        PSHx:   Past Surgical History:   Procedure Laterality Date    TX LARYNGOSCOPY,DIRECT,DIAGNOSTIC Bilateral 04/25/2024    Procedure: SEPTOPLASTY, LARYNGOSCOPY, DIRECT Without biopsy, BILATERAL EXCISION INFERIOR TURBINATE PARTIAL;  Surgeon: John Mercer M.D.;  Location: SURGERY SAME DAY [facility];  Service: Ent    TX EXCISION TURBINATE Bilateral 04/25/2024    Procedure: EXCISION, NASAL TURBINATE;  Surgeon: John Mercer M.D.;  Location: SURGERY SAME DAY [facility];  Service: Ent    LAMINOTOMY      OTHER      back surgery       Family history   Family History   Problem Relation Age of Onset    Diabetes Mother     Anxiety disorder Mother     Depression Mother     Ovarian Cancer Mother     Arthritis Mother     Cancer Mother     Lupus Mother     Alcohol abuse Father     Alcohol abuse Brother     Schizophrenia Brother     Anxiety disorder Brother     Depression Brother     Psychiatric Illness Brother     Pancreatic Cancer Brother     Colon Cancer Maternal Aunt 59    Breast Cancer Paternal Aunt     Breast Cancer Paternal Aunt     Breast Cancer Paternal Aunt     Heart Disease Maternal Grandmother     Diabetes Maternal Grandmother     Stroke Maternal Grandfather     Pancreatic Cancer Paternal Grandmother        Medications: reviewed on epic.   Outpatient Medications Marked as Taking for the 4/15/25 encounter (Office Visit) with Sylvester Laureano D.O.   Medication Sig Dispense Refill    methylphenidate (DAYTRANA) 20 MG/9HR patch Place 1 Patch on the skin every day.      metFORMIN (GLUCOPHAGE) 850 MG Tab Take 1 Tablet by mouth every day. Replaces old script 30 Tablet 7    Ciclopirox 1 % Shampoo USE TOPICALLY ON AFFECTED AREA DAILY FOR 1 TO 2 WEEKS UNTIL RESOLVED, THEN CAN USE 2-3 TIMES PER WEEK AS NEEDED 120 mL 3    Semaglutide, 2 MG/DOSE, (OZEMPIC, 2 MG/DOSE,) 8 MG/3ML Solution Pen-injector DIAL AND INJECT UNDER THE SKIN 2 MG EVERY 7 DAYS 3 mL 6    tretinoin  (RETIN-A) 0.025 % cream AAA at bedtime, pea sized amount after moisturizer, start 2-3 times per week, increase as tolerated 45 g 1    venlafaxine ER (EFFEXOR) 225 MG TABLET SR 24 HR tablet Take 225 mg by mouth every day.      busPIRone (BUSPAR) 30 MG tablet 30 mg 2 times a day.      LORazepam (ATIVAN) 1 MG Tab Take 1 mg by mouth every 6 hours as needed.       Current Facility-Administered Medications for the 4/15/25 encounter (Office Visit) with Sylvester Laureano D.O.   Medication Dose Route Frequency Provider Last Rate Last Admin    dexamethasone (Decadron) injection 4 mg  4 mg Injection Once         lidocaine (Xylocaine) 1 % injection 9 mL  9 mL Injection Once         dexamethasone (Decadron) injection 4 mg  4 mg Injection Once             Allergies:   Allergies   Allergen Reactions    Cat Hair Extract      Other reaction(s): Not available    Cat Pelt Standardized Allergenic Extract       Social Hx:   Social History     Socioeconomic History    Marital status: Single     Spouse name: Not on file    Number of children: Not on file    Years of education: Not on file    Highest education level: Associate degree: academic program   Occupational History    Not on file   Tobacco Use    Smoking status: Never    Smokeless tobacco: Never   Vaping Use    Vaping status: Never Used   Substance and Sexual Activity    Alcohol use: Yes     Alcohol/week: 0.6 oz     Types: 1 Shots of liquor per week     Comment: rare    Drug use: Never    Sexual activity: Not Currently     Partners: Male     Birth control/protection: Condom, Implant   Other Topics Concern    Not on file   Social History Narrative    Not on file     Social Drivers of Health     Financial Resource Strain: Patient Declined (3/14/2024)    Overall Financial Resource Strain (CARDIA)     Difficulty of Paying Living Expenses: Patient declined   Food Insecurity: Unknown (3/14/2024)    Hunger Vital Sign     Worried About Running Out of Food in the Last Year: Never true     Ran  "Out of Food in the Last Year: Patient declined   Transportation Needs: No Transportation Needs (3/14/2024)    PRAPARE - Transportation     Lack of Transportation (Medical): No     Lack of Transportation (Non-Medical): No   Physical Activity: Insufficiently Active (3/14/2024)    Exercise Vital Sign     Days of Exercise per Week: 2 days     Minutes of Exercise per Session: 30 min   Stress: Stress Concern Present (3/14/2024)    [employer] [place] [employer] - Occupational Stress Questionnaire     Feeling of Stress : To some extent   Social Connections: Moderately Isolated (3/14/2024)    Social Connection and Isolation Panel [NHANES]     Frequency of Communication with Friends and Family: Three times a week     Frequency of Social Gatherings with Friends and Family: Twice a week     Attends [employer] Services: More than 4 times per year     Active Member of Clubs or Organizations: No     Attends Club or Organization Meetings: Patient declined     Marital Status:    Intimate Partner Violence: Not on file   Housing Stability: High Risk (3/14/2024)    Housing Stability Vital Sign     Unable to Pay for Housing in the Last Year: Yes     Number of Places Lived in the Last Year: 1     Unstable Housing in the Last Year: No         EXAMINATION   Vitals: /78 (BP Location: Right arm, Patient Position: Sitting, BP Cuff Size: Adult)   Pulse 90   Temp 37.3 °C (99.1 °F) (Temporal)   Ht 1.6 m (5' 3\")   Wt 70 kg (154 lb 5.2 oz)   SpO2 97%   Physical Exam:     Body Habitus: Body mass index is 27.34 kg/m².  Appearance: Well-groomed, well-nourished, not disheveled  Eyes: No scleral icterus to suggest severe liver disease, no proptosis to suggest severe hyperthyroid  ENT -no obvious auditory deficits, no external lesions, moist mucus membranes   Skin -no rashes or lesions noted. No appreciable skin breakdown on exposed skin areas.    Respiratory-  breathing comfortably on room air, no audible wheezing, full " "sentences  Cardiovascular- No lower extremity edema noted.   Psychiatric- alert and oriented, calm, comfortable, cooperative     Musculoskeletal and Neuro:  Gait and station - normal gait with reciprocal pattern,  no presence/use of ambulatory device, no arm assistance with sit-to-stand, nonantalgic. no loss of balance during exam.  No change in patient's demeanor with exam.    Grossly normal cranial nerve exam  Coordination grossly intact       Physical Exam  - Manual motor testing in the musculoskeletal system shows 5 out of 5 from L2-S1  - Light touch is intact from L2-S1  - Tenderness to palpation in the gluteal region and bilateral greater trochanters  - Single leg balance close to 10 seconds bilaterally, becomes wobbly and weaker at the end  - Hip abduction and adduction is 4+ out of 5 compared to the rest of remainder motor testing         MEDICAL DECISION MAKING    Medical records review: see under HPI section.     DATA    Labs:   Lab Results   Component Value Date/Time    SODIUM 138 03/04/2024 07:52 AM    POTASSIUM 3.9 03/04/2024 07:52 AM    CHLORIDE 105 03/04/2024 07:52 AM    CO2 23 03/04/2024 07:52 AM    ANION 10.0 03/04/2024 07:52 AM    GLUCOSE 100 (H) 03/04/2024 07:52 AM    BUN 7 (L) 03/04/2024 07:52 AM    CREATININE 0.60 03/04/2024 07:52 AM    CALCIUM 9.1 03/04/2024 07:52 AM    ASTSGOT 21 03/04/2024 07:52 AM    ALTSGPT 28 03/04/2024 07:52 AM    TBILIRUBIN 0.6 03/04/2024 07:52 AM    ALBUMIN 4.0 03/04/2024 07:52 AM    TOTPROTEIN 7.0 03/04/2024 07:52 AM    GLOBULIN 3.0 03/04/2024 07:52 AM    AGRATIO 1.3 03/04/2024 07:52 AM   ]    No results found for: \"PROTHROMBTM\", \"INR\"     Lab Results   Component Value Date/Time    WBC 5.4 06/08/2024 09:26 AM    RBC 5.04 06/08/2024 09:26 AM    HEMOGLOBIN 15.3 06/08/2024 09:26 AM    HEMATOCRIT 45.1 06/08/2024 09:26 AM    MCV 89.5 06/08/2024 09:26 AM    MCH 30.4 06/08/2024 09:26 AM    MCHC 33.9 06/08/2024 09:26 AM    MPV 9.7 06/08/2024 09:26 AM    NEUTSPOLYS 55.50 " 2019 07:46 AM    LYMPHOCYTES 34.40 2019 07:46 AM    MONOCYTES 7.20 2019 07:46 AM    EOSINOPHILS 2.10 2019 07:46 AM    BASOPHILS 0.60 2019 07:46 AM        Lab Results   Component Value Date/Time    HBA1C 5.4 2023 07:38 AM        Imaging:   I personally reviewed following images, these are my reads  Lumbar x-ray 2024: Mild L4-5 and L5-S1 facet joint arthrosis.  Degenerative changes  Left knee x-ray 10/20/2023: No acute bony processes: Very mild degenerative changes    MRI L-Spine on 24: mild degenerative changes and facet arthropathy. No areas of high-grade central canal or neuroforaminal stenosis.       24 thoracic xr: mild degen changes  24 hip xr: no acute osseous abnormalities    IMAGING radiology reads. I reviewed the following radiology reads                               Results for orders placed during the hospital encounter of 23    DX-KNEE 3 VIEWS LEFT    Impression  No evidence of acute fracture or dislocation.    Results for orders placed during the hospital encounter of 23    DX-KNEE COMPLETE 4+ LEFT    Impression  Normal left knee radiography.   Results for orders placed during the hospital encounter of 24    DX-LUMBAR SPINE-2 OR 3 VIEWS    Impression  1.  Transitional thoracolumbar vertebra considered T12 this dictation.    2.  Mild/moderate L4-5 degenerative disc disease and mild L4-5 and L5-S1 facet joint arthrosis.                           ASSESSMENT AND PLAN:  Naomi Peters   : 1985   Past medical history of prediabetes, panic attacks, migraines, major depressive disorder, general anxiety disorder, chronic low back pain, BMI 27    1. Myalgia  Single Tendon Sheath / Ligament Injection    dexamethasone (Decadron) injection 4 mg    lidocaine (Xylocaine) 1 % injection 9 mL    dexamethasone (Decadron) injection 4 mg    Consent for all Surgical, Special Diagnostic or Therapeutic Procedures      2. Exercise counseling         3. Chronic pain of left knee        4. Chronic low back pain, unspecified back pain laterality, unspecified whether sciatica present        5. Greater trochanteric bursitis of left hip  Single Tendon Sheath / Ligament Injection    dexamethasone (Decadron) injection 4 mg    lidocaine (Xylocaine) 1 % injection 9 mL    Consent for all Surgical, Special Diagnostic or Therapeutic Procedures      6. Greater trochanteric bursitis of right hip  Single Tendon Sheath / Ligament Injection    lidocaine (Xylocaine) 1 % injection 9 mL    dexamethasone (Decadron) injection 4 mg    Consent for all Surgical, Special Diagnostic or Therapeutic Procedures          Assessment & Plan  Greater trochanteric pain syndrome bilateral  - Experiencing pain in both greater trochanters, reproducible upon palpation and painful when lying on it  - Previously diagnosed on the left side and managed effectively with an injection (approx. 7 months of relief)  - Current symptoms suggest similar condition on the right side  - Pain likely due to weakness in gluteus colleen and medius muscles  - Advised to continue physical therapy and home exercises to strengthen muscles  - Bilateral injection to be administered today to reduce inflammation and provide pain relief  - Lidocaine patches available over the counter for additional pain management as needed      Myalgia lumbar paraspinal muscles with reproducible tenderness to palpation.  No acute osseous abnormalities on x-rays performed in July.  Will proceed with future trigger point injections without corticosteroid    Prior prescription with roxyicodone and provide pain relief  Past medical history of, panic attacks, major depressive disorder: already established with behavioral health    exercising counseling discussed.  Extensive discussion regarding treatment options, at this time recommending the following:    Orders Placed This Encounter    Single Tendon Sheath / Ligament Injection     dexamethasone (Decadron) injection 4 mg    lidocaine (Xylocaine) 1 % injection 9 mL    dexamethasone (Decadron) injection 4 mg    Consent for all Surgical, Special Diagnostic or Therapeutic Procedures         PLAN  -Medications/Modalities:    No changes in medications. Continue tizanidine  -Therapy (PT/OT/Aquatherapy): continue to focus on strengthening and stretching    -Home exercise program: encouraged    -Office Injections: will perform with this visit  Patient would like to proceed with injection of bilateral greater trochanter, gluteus medius tendon insertion, and gluteus medius belly.  The risks, benefits, and alternatives to this procedure were discussed and the patient wishes to proceed with the procedure. Risks include but are not limited to damage to surrounding structures, infection, bleeding, worsening of pain which can be permanent, and weakness which can be permanent. Benefits include pain relief and improved function. Alternatives include not doing the procedure.   Discussed that for safety reasons to minimize systemic SE of steroids which can include altering the HPA axis, increase glucose levels, insomnia, immunosuppresion, irritability, weight gain, deteriorate cartilage, and decrease bone mineral density, I would ideally space out steroid injections by at least 3 months.  -Diagnostic workup: reviewed today as above   -Referrals: NA    Follow-up: Follow-up in 2 weeks post injections virtual if pain resolved    Patient expressed understanding of the management plan. Patient was encouraged to call if any worries, issues, problems or concerns prior to the next visit     Please note that this dictation was created using voice recognition software. I have made every reasonable attempt to correct obvious errors but there may be errors of grammar and content that I may have overlooked prior to finalization of this note.      Sylvester Laureano, DO  Physical Medicine and Rehabilitation  [facility]          CC TIARA Díaz.   Christiane Glez*

## 2025-04-15 NOTE — PROCEDURES
Date of Service: 4/15/2025    Physician/s: Sylvester Larueano D.O.    Pre-operative Diagnosis: BILATERAL gluteus medius tendinitis/tendinopathy    Post-operative Diagnosis: BILATERAL  gluteus medius tendinitis/tendinopathy    Procedure: BILATERAL gluteus medius peritendinous injection ultrasound-guided    Description of procedure:    The risks, benefits, and alternatives of the procedure were reviewed and discussed with the patient.  Written informed consent was freely obtained. A pre-procedural time-out was conducted by the physician verifying patient’s identity, procedure to be performed, procedure site and side, and allergy verification. Appropriate equipment was determined to be in place for the procedure.     No sedation was used for this procedure.     In the office suite the patient was placed in a lateral position with his BILATERAL  side up, and the skin was prepped and draped in the usual sterile fashion. The ultrasound probe was placed over the BILATERAL  greater trochanter and the gluteus medius tendon was located and the target for injection was marked. A 25g 3.5 inch needle was placed into skin and advanced under ultrasound guidance adjacent to the gluteus medius tendon at the level of the greater trochanter. Following negative aspiration, approx 2cc of 1% lidocaine with 1cc of  4mg/mL dexamethasone was then injected adjacent to the tendon and the needle was subsequently removed intact after restyleted. The patient's hip was wiped with a 4x4 gauze, the area was cleansed with alcohol prep, and a bandaid was applied. There were no complications noted. The images were saved for permanent storage.         Postprocedure care explained to the patient regarding icing as well as stretching activity.      Instructed patient to avoid submerging in water for at least 24 to 48 hours.    Instructed patient to monitor injection site for signs of infection.    Preprocedural pain: 7/10  Postprocedural pain: 0-1/10    Tendon  origin/insertion:  20551  Ultrasonic guidance for needle placement: 60925      Sylvester Laureano DO  Physical Medicine and Rehabilitation  [facility]         TRIGGER POINT INJECTION     Date of Service: 4/15/2025     Physician/s: Sylvester Laureano D.O.    Pre-operative Diagnosis: Myalgia (M79.1)    Post-operative Diagnosis: Myalgia (M79.1)    Procedure: BILATERAL belly of gluteus medius and gluteus colleen trigger point injections    The risks, benefits, and alternatives of the procedure were reviewed and discussed with the patient.  Written informed consent was freely obtained. A pre-procedural time-out was conducted by the physician verifying patient’s identity, procedure to be performed, procedure site and side, and allergy verification. Appropriate equipment was determined to be in place for the procedure.     The site as well as the side was identified  and the patient was counseled on the procedure along with the side effects, which are not limited to increased pain, hematoma, atrophy of tissues, infection, nerve damage, CRPS (RSD), skin dimpling, loss of skin/subcutaneous tissue/fat tissue, paraplegia, tetraplegia, septicemia, and other known and unknown effects which might be harmful/fatal.  The patient understood the same and verbalized agreement. It was carried out as below:     Solution used:   -total 6ml mixed; 5ml 1% lidocaine, 0ml bupivacaine, 1ml 4mg/ml dexamethasone    Total solution used:  6    Injected: Myofascial trigger points at the above muscle(s) was/(were) inactivated via needling procedure under aseptic precautions.      In the office suite exam room the patient was placed in a lateral decubitus position and the skin areas for injection over the above muscle(s) was/were marked. A solution was prepared as above. Ultrasound was confirmed to view the adjacent structures for blood vessels and nerves and to confirm the needle path was not within the structures nor was it too deep to be within the  lung or organs. A 27g needle was placed into each of the markings at the areas above under ultrasound guidance with an in plane approach.. After negative aspiration, approximately a 1.5 mL of the above solution was injected. The needle was removed intact after each trigger point injection, and the patient's back was covered with a 4x4 gauze, gentle soft tissue massage, and a dressing was applied. There were no complications noted. The images were uploaded to our media tab for permanent storage.    Postprocedure care explained to the patient regarding icing as well as stretching activity.      Instructed patient to avoid submerging in water for at least 24 to 48 hours.    Instructed patient to monitor injection site for signs of infection.    Preprocedural pain: 7/10  Postprocedural pain: 0-1/10    Trigger point injection, 3 or more muscles [id number]  Ultrasonic guidance for needle placement: [id number]      Sylvester Laureano DO  Physical Medicine and Rehabilitation  [facility]

## 2025-05-06 ENCOUNTER — APPOINTMENT (OUTPATIENT)
Dept: PHYSICAL MEDICINE AND REHAB | Facility: MEDICAL CENTER | Age: 40
End: 2025-05-06
Payer: COMMERCIAL

## 2025-05-06 VITALS — WEIGHT: 154.32 LBS | HEIGHT: 63 IN | BODY MASS INDEX: 27.34 KG/M2

## 2025-05-06 DIAGNOSIS — M54.50 CHRONIC LOW BACK PAIN, UNSPECIFIED BACK PAIN LATERALITY, UNSPECIFIED WHETHER SCIATICA PRESENT: ICD-10-CM

## 2025-05-06 DIAGNOSIS — Z71.82 EXERCISE COUNSELING: ICD-10-CM

## 2025-05-06 DIAGNOSIS — M70.62 GREATER TROCHANTERIC BURSITIS OF LEFT HIP: ICD-10-CM

## 2025-05-06 DIAGNOSIS — G89.29 CHRONIC LOW BACK PAIN, UNSPECIFIED BACK PAIN LATERALITY, UNSPECIFIED WHETHER SCIATICA PRESENT: ICD-10-CM

## 2025-05-06 DIAGNOSIS — M70.61 GREATER TROCHANTERIC BURSITIS OF RIGHT HIP: ICD-10-CM

## 2025-05-06 DIAGNOSIS — M79.10 MYALGIA: ICD-10-CM

## 2025-05-06 PROCEDURE — 99999 PR NO CHARGE: CPT | Performed by: GENERAL PRACTICE

## 2025-05-06 ASSESSMENT — PAIN SCALES - GENERAL: PAINLEVEL_OUTOF10: NO PAIN

## 2025-05-06 ASSESSMENT — FIBROSIS 4 INDEX: FIB4 SCORE: .4535573676110726727

## 2025-05-29 DIAGNOSIS — E66.9 OBESITY (BMI 35.0-39.9 WITHOUT COMORBIDITY): ICD-10-CM

## 2025-05-29 DIAGNOSIS — E28.2 PCOS (POLYCYSTIC OVARIAN SYNDROME): ICD-10-CM

## 2025-05-29 RX ORDER — SEMAGLUTIDE 2.68 MG/ML
INJECTION, SOLUTION SUBCUTANEOUS
Qty: 3 ML | Refills: 6 | Status: SHIPPED | OUTPATIENT
Start: 2025-05-29

## 2025-07-24 ENCOUNTER — OFFICE VISIT (OUTPATIENT)
Dept: URGENT CARE | Facility: CLINIC | Age: 40
End: 2025-07-24
Payer: COMMERCIAL

## 2025-07-24 VITALS
DIASTOLIC BLOOD PRESSURE: 66 MMHG | WEIGHT: 140.8 LBS | RESPIRATION RATE: 20 BRPM | HEIGHT: 63 IN | HEART RATE: 99 BPM | TEMPERATURE: 98.9 F | SYSTOLIC BLOOD PRESSURE: 104 MMHG | OXYGEN SATURATION: 97 % | BODY MASS INDEX: 24.95 KG/M2

## 2025-07-24 DIAGNOSIS — U07.1 COVID-19: ICD-10-CM

## 2025-07-24 DIAGNOSIS — R50.9 FEVER, UNSPECIFIED FEVER CAUSE: Primary | ICD-10-CM

## 2025-07-24 LAB
FLUAV RNA SPEC QL NAA+PROBE: NEGATIVE
FLUBV RNA SPEC QL NAA+PROBE: NEGATIVE
RSV RNA SPEC QL NAA+PROBE: NEGATIVE
SARS-COV-2 RNA RESP QL NAA+PROBE: POSITIVE

## 2025-07-24 PROCEDURE — 3074F SYST BP LT 130 MM HG: CPT | Performed by: STUDENT IN AN ORGANIZED HEALTH CARE EDUCATION/TRAINING PROGRAM

## 2025-07-24 PROCEDURE — 3078F DIAST BP <80 MM HG: CPT | Performed by: STUDENT IN AN ORGANIZED HEALTH CARE EDUCATION/TRAINING PROGRAM

## 2025-07-24 PROCEDURE — 87637 SARSCOV2&INF A&B&RSV AMP PRB: CPT | Performed by: STUDENT IN AN ORGANIZED HEALTH CARE EDUCATION/TRAINING PROGRAM

## 2025-07-24 PROCEDURE — 99213 OFFICE O/P EST LOW 20 MIN: CPT | Performed by: STUDENT IN AN ORGANIZED HEALTH CARE EDUCATION/TRAINING PROGRAM

## 2025-07-24 ASSESSMENT — FIBROSIS 4 INDEX: FIB4 SCORE: .4535573676110726727

## 2025-07-24 NOTE — PROGRESS NOTES
Subjective:   Naomi Peters is a 39 y.o. female who presents for Fever (Started yesterday and filling tired, lightheaded and dizzy )      HPI:  39-year-old female presents to the urgent care for 24 hours of fatigue, fever, body aches, postnasal drip, mild runny nose, and mild sore throat.  States that one of her friends just recently returned from out of state and was sick and she was just around this individual couple days prior to her symptoms started.  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  No shortness of breath or chest pain.  No vertigo.    Medications:    busPIRone  Ciclopirox Sham  LORazepam Tabs  metFORMIN Tabs  methylphenidate  Ozempic (2 MG/DOSE) Sopn  tretinoin  venlafaxine ER Tb24    Allergies: Cat hair extract    Problem List: Naomi Peters does not have any pertinent problems on file.    Surgical History:  Past Surgical History:   Procedure Laterality Date    SD LARYNGOSCOPY,DIRECT,DIAGNOSTIC Bilateral 04/25/2024    Procedure: SEPTOPLASTY, LARYNGOSCOPY, DIRECT Without biopsy, BILATERAL EXCISION INFERIOR TURBINATE PARTIAL;  Surgeon: John Mercer M.D.;  Location: SURGERY SAME DAY [facility];  Service: Ent    SD EXCISION TURBINATE Bilateral 04/25/2024    Procedure: EXCISION, NASAL TURBINATE;  Surgeon: John Mercer M.D.;  Location: SURGERY SAME DAY [facility];  Service: Ent    LAMINOTOMY      OTHER      back surgery       Past Social Hx: Naomi Peters  reports that she has never smoked. She has never used smokeless tobacco. She reports current alcohol use of about 0.6 oz of alcohol per week. She reports that she does not use drugs.     Past Family Hx:  Naomi Peters family history includes Alcohol abuse in her brother and father; Anxiety disorder in her brother and mother; Arthritis in her mother; Breast Cancer in her paternal aunt, paternal aunt, and paternal aunt; Cancer in her mother; Colon  "Cancer (age of onset: 59) in her maternal aunt; Depression in her brother and mother; Diabetes in her maternal grandmother and mother; Heart Disease in her maternal grandmother; Lupus in her mother; Ovarian Cancer in her mother; Pancreatic Cancer in her brother and paternal grandmother; Psychiatric Illness in her brother; Schizophrenia in her brother; Stroke in her maternal grandfather.     Problem list, medications, and allergies reviewed by myself today in Epic.     Objective:     /66 (BP Location: Left arm, Patient Position: Sitting, BP Cuff Size: Adult)   Pulse 99   Temp 37.2 °C (98.9 °F) (Temporal)   Resp 20   Ht 1.6 m (5' 3\")   Wt 63.9 kg (140 lb 12.8 oz)   SpO2 97%   BMI 24.94 kg/m²     Physical Exam  Vitals reviewed.   Constitutional:       Appearance: She is not toxic-appearing.   HENT:      Right Ear: Tympanic membrane, ear canal and external ear normal.      Left Ear: Tympanic membrane, ear canal and external ear normal.      Nose: Rhinorrhea present. No congestion.      Mouth/Throat:      Mouth: Mucous membranes are moist.      Pharynx: Posterior oropharyngeal erythema present.   Cardiovascular:      Rate and Rhythm: Normal rate and regular rhythm.      Pulses: Normal pulses.      Heart sounds: Normal heart sounds. No murmur heard.  Pulmonary:      Effort: Pulmonary effort is normal. No respiratory distress.      Breath sounds: Normal breath sounds. No stridor. No wheezing, rhonchi or rales.   Lymphadenopathy:      Cervical: No cervical adenopathy.         Lab Results/POC Test Results   Results for orders placed or performed in visit on 07/24/25   POCT CoV-2, Flu A/B, RSV by PCR    Collection Time: 07/24/25 11:33 AM   Result Value Ref Range    SARS-CoV-2 by PCR Positive (A) Negative, Invalid    Influenza virus A RNA Negative Negative, Invalid    Influenza virus B, PCR Negative Negative, Invalid    RSV, PCR Negative Negative, Invalid           Assessment/Plan:     Diagnosis and associated " orders:     1. Fever, unspecified fever cause  POCT CoV-2, Flu A/B, RSV by PCR      2. COVID-19           Comments/MDM:     COVID-19 positive.  This is consistent with the patient's presentation and physical exam findings.  No indication for antibiotics at this time.  Vitals are stable.  No chest pain or shortness of breath.  Discussed home supportive care.  Discussed typical timeframe for resolution of symptoms.         Differential diagnosis, natural history, supportive care, and indications for immediate follow-up discussed.    Advised the patient to follow-up with the primary care physician for recheck, reevaluation, and consideration of further management.    Please note that this dictation was created using voice recognition software. I have made a reasonable attempt to correct obvious errors, but I expect that there are errors of grammar and possibly content that I did not discover before finalizing the note.    Electronically signed by Kavon Tomlin PA-C.

## (undated) DEVICE — PATTIES SURG X-RAYCOTTONOID - 1/2 X 3 IN (200/CA)

## (undated) DEVICE — GLOVE BIOGEL PI INDICATOR SZ 8.0 SURGICAL PF LF -(50/BX 4BX/CA)

## (undated) DEVICE — DRAPE LARGE 3 QUARTER - (20/CA)

## (undated) DEVICE — SUCTION INSTRUMENT YANKAUER BULBOUS TIP W/O VENT (50EA/CA)

## (undated) DEVICE — SPLINT NASAL DOYLE AIRWAY (2EA/ST)

## (undated) DEVICE — GOWN SURGICAL XX-LARGE - (28EA/CA) SIRUS NON REINFORCED

## (undated) DEVICE — ANTI-FOG SOLUTION - 60BTL/CA

## (undated) DEVICE — LACTATED RINGERS INJ 1000 ML - (14EA/CA 60CA/PF)

## (undated) DEVICE — TOWEL, DRAPE, POLYLINED

## (undated) DEVICE — BOVIE FOOT CONTROL SUCTION - 6IN 10FR (25EA/CA)

## (undated) DEVICE — GOWN SURGEONS X-LARGE - DISP. (30/CA)

## (undated) DEVICE — PACK ENT OR - (2EA/CA)

## (undated) DEVICE — SUTURE GENERAL

## (undated) DEVICE — SENSOR OXIMETER ADULT SPO2 RD SET (20EA/BX)

## (undated) DEVICE — GLOVE BIOGEL SZ 7.5 SURGICAL PF LTX - (50PR/BX 4BX/CA)

## (undated) DEVICE — TOWEL STOP TIMEOUT SAFETY FLAG (40EA/CA)

## (undated) DEVICE — SLEEVE VASO CALF MED - (10PR/CA)

## (undated) DEVICE — GOWN SURGEONS LARGE - (32/CA)

## (undated) DEVICE — GLOVE BIOGEL INDICATOR SZ 7.5 SURGICAL PF LTX - (50PR/BX 4BX/CA)

## (undated) DEVICE — TEETHGUARD ENT -2BX MIN ORDER- (6EA/BX)

## (undated) DEVICE — CANISTER SUCTION 3000ML MECHANICAL FILTER AUTO SHUTOFF MEDI-VAC NONSTERILE LF DISP  (40EA/CA)

## (undated) DEVICE — CANISTER SUCTION RIGID RED 1500CC (40EA/CA)

## (undated) DEVICE — DRAPE MAYO STAND - (30/CA)

## (undated) DEVICE — KIT  I.V. START (100EA/CA)

## (undated) DEVICE — SUTURE 4-0 PLAIN GUT SC-1 ETHICON (36PK/BX)

## (undated) DEVICE — ELECTRODE DUAL RETURN W/ CORD - (50/PK)

## (undated) DEVICE — GOWN WARMING STANDARD FLEX - (30/CA)

## (undated) DEVICE — CANNULA O2 COMFORT SOFT EAR ADULT 7 FT TUBING (50/CA)

## (undated) DEVICE — TUBE CONNECTING SUCTION - CLEAR PLASTIC STERILE 72 IN (50EA/CA)

## (undated) DEVICE — TUBE CONNECT SUCTION CLEAR 120 X 1/4" (50EA/CA)"

## (undated) DEVICE — WATER IRRIGATION STERILE 1000ML (12EA/CA)

## (undated) DEVICE — DRESSING MASS EYE & EAR SIZE 2 (2EA/PK  50PK/BX  100EA/BX)

## (undated) DEVICE — SODIUM CHL IRRIGATION 0.9% 1000ML (12EA/CA)

## (undated) DEVICE — BLADE INFERIOR TURBINATE 2MM  (5EA/PK)

## (undated) DEVICE — TUBING CLEARLINK DUO-VENT - C-FLO (48EA/CA)

## (undated) DEVICE — GLOVE BIOGEL ECLIPSE  PF LATEX SIZE 6.5 (50PR/BX)

## (undated) DEVICE — MASK OXYGEN VNYL ADLT MED CONC WITH 7 FOOT TUBING  - (50EA/CA)

## (undated) DEVICE — SET LEADWIRE 5 LEAD BEDSIDE DISPOSABLE ECG (1SET OF 5/EA)

## (undated) DEVICE — GLOVE SZ 6 BIOGEL PI MICRO - PF LF (50PR/BX 4BX/CA)

## (undated) DEVICE — SUTURE 2-0 PROLENE SH D/A (36PK/BX)

## (undated) DEVICE — JELLY SURGILUBE STERILE TUBE 4.25 OZ (1/EA)